# Patient Record
Sex: MALE | Race: WHITE | Employment: OTHER | URBAN - NONMETROPOLITAN AREA
[De-identification: names, ages, dates, MRNs, and addresses within clinical notes are randomized per-mention and may not be internally consistent; named-entity substitution may affect disease eponyms.]

---

## 2017-01-01 ENCOUNTER — HOSPITAL ENCOUNTER (OUTPATIENT)
Dept: PHARMACY | Age: 82
Setting detail: THERAPIES SERIES
Discharge: HOME | End: 2017-12-13
Payer: MEDICARE

## 2017-01-01 ENCOUNTER — HOSPITAL ENCOUNTER (OUTPATIENT)
Dept: LAB | Age: 82
Discharge: HOME | End: 2017-08-28
Payer: MEDICARE

## 2017-01-01 ENCOUNTER — OFFICE VISIT (OUTPATIENT)
Dept: INTERNAL MEDICINE | Age: 82
End: 2017-01-01
Payer: MEDICARE

## 2017-01-01 ENCOUNTER — HOSPITAL ENCOUNTER (OUTPATIENT)
Dept: PHARMACY | Age: 82
Setting detail: THERAPIES SERIES
Discharge: HOME | End: 2017-10-09
Payer: MEDICARE

## 2017-01-01 ENCOUNTER — HOSPITAL ENCOUNTER (OUTPATIENT)
Dept: PHARMACY | Age: 82
Setting detail: THERAPIES SERIES
Discharge: HOME | End: 2017-11-07
Payer: MEDICARE

## 2017-01-01 ENCOUNTER — TELEPHONE (OUTPATIENT)
Dept: CARDIOLOGY | Age: 82
End: 2017-01-01

## 2017-01-01 ENCOUNTER — HOSPITAL ENCOUNTER (OUTPATIENT)
Dept: PHARMACY | Age: 82
Setting detail: THERAPIES SERIES
Discharge: HOME | End: 2017-09-11
Payer: MEDICARE

## 2017-01-01 ENCOUNTER — HOSPITAL ENCOUNTER (OUTPATIENT)
Dept: PHARMACY | Age: 82
Setting detail: THERAPIES SERIES
Discharge: HOME | End: 2017-08-14
Payer: MEDICARE

## 2017-01-01 VITALS
SYSTOLIC BLOOD PRESSURE: 92 MMHG | HEIGHT: 69 IN | HEART RATE: 84 BPM | DIASTOLIC BLOOD PRESSURE: 64 MMHG | WEIGHT: 153 LBS | RESPIRATION RATE: 20 BRPM | BODY MASS INDEX: 22.66 KG/M2

## 2017-01-01 DIAGNOSIS — E11.9 TYPE 2 DIABETES MELLITUS WITHOUT COMPLICATION, WITHOUT LONG-TERM CURRENT USE OF INSULIN (HCC): ICD-10-CM

## 2017-01-01 DIAGNOSIS — Z79.01 LONG TERM CURRENT USE OF ANTICOAGULANT THERAPY: ICD-10-CM

## 2017-01-01 DIAGNOSIS — D64.9 ANEMIA, UNSPECIFIED TYPE: ICD-10-CM

## 2017-01-01 DIAGNOSIS — E78.5 HYPERLIPIDEMIA, UNSPECIFIED HYPERLIPIDEMIA TYPE: ICD-10-CM

## 2017-01-01 DIAGNOSIS — I50.9 CONGESTIVE HEART FAILURE, UNSPECIFIED CONGESTIVE HEART FAILURE CHRONICITY, UNSPECIFIED CONGESTIVE HEART FAILURE TYPE: ICD-10-CM

## 2017-01-01 DIAGNOSIS — Z85.46 HISTORY OF PROSTATE CANCER: ICD-10-CM

## 2017-01-01 DIAGNOSIS — I10 ESSENTIAL HYPERTENSION: ICD-10-CM

## 2017-01-01 DIAGNOSIS — I10 ESSENTIAL HYPERTENSION: Primary | ICD-10-CM

## 2017-01-01 LAB
HEMOGLOBIN: 11.9 G/DL (ref 13.5–17.5)
INR BLD: 1.6
INR BLD: 1.8
INR BLD: 2
INR BLD: 2.7
INR BLD: 3.5
IRON SATURATION: 24 % (ref 20–55)
IRON: 75 UG/DL (ref 59–158)
TOTAL IRON BINDING CAPACITY: 317 UG/DL (ref 250–450)
UNSATURATED IRON BINDING CAPACITY: 242 UG/DL (ref 112–347)

## 2017-01-01 PROCEDURE — 36416 COLLJ CAPILLARY BLOOD SPEC: CPT

## 2017-01-01 PROCEDURE — 99211 OFF/OP EST MAY X REQ PHY/QHP: CPT

## 2017-01-01 PROCEDURE — G8484 FLU IMMUNIZE NO ADMIN: HCPCS | Performed by: INTERNAL MEDICINE

## 2017-01-01 PROCEDURE — G0008 ADMIN INFLUENZA VIRUS VAC: HCPCS | Performed by: INTERNAL MEDICINE

## 2017-01-01 PROCEDURE — 85610 PROTHROMBIN TIME: CPT

## 2017-01-01 PROCEDURE — 83540 ASSAY OF IRON: CPT

## 2017-01-01 PROCEDURE — 1123F ACP DISCUSS/DSCN MKR DOCD: CPT | Performed by: INTERNAL MEDICINE

## 2017-01-01 PROCEDURE — G8420 CALC BMI NORM PARAMETERS: HCPCS | Performed by: INTERNAL MEDICINE

## 2017-01-01 PROCEDURE — 1036F TOBACCO NON-USER: CPT | Performed by: INTERNAL MEDICINE

## 2017-01-01 PROCEDURE — 85018 HEMOGLOBIN: CPT

## 2017-01-01 PROCEDURE — 4040F PNEUMOC VAC/ADMIN/RCVD: CPT | Performed by: INTERNAL MEDICINE

## 2017-01-01 PROCEDURE — G8427 DOCREV CUR MEDS BY ELIG CLIN: HCPCS | Performed by: INTERNAL MEDICINE

## 2017-01-01 PROCEDURE — 36415 COLL VENOUS BLD VENIPUNCTURE: CPT

## 2017-01-01 PROCEDURE — 90662 IIV NO PRSV INCREASED AG IM: CPT | Performed by: INTERNAL MEDICINE

## 2017-01-01 PROCEDURE — 83550 IRON BINDING TEST: CPT

## 2017-01-01 PROCEDURE — 99214 OFFICE O/P EST MOD 30 MIN: CPT | Performed by: INTERNAL MEDICINE

## 2017-01-01 PROCEDURE — G8598 ASA/ANTIPLAT THER USED: HCPCS | Performed by: INTERNAL MEDICINE

## 2017-01-01 RX ORDER — WARFARIN SODIUM 4 MG/1
TABLET ORAL
Qty: 30 TABLET | Refills: 11 | Status: ON HOLD | OUTPATIENT
Start: 2017-01-01 | End: 2018-01-01 | Stop reason: HOSPADM

## 2017-01-01 RX ORDER — LOSARTAN POTASSIUM 50 MG/1
50 TABLET ORAL DAILY
Qty: 30 TABLET | Refills: 11 | Status: SHIPPED | OUTPATIENT
Start: 2017-01-01 | End: 2017-01-01 | Stop reason: ALTCHOICE

## 2017-01-01 RX ORDER — FUROSEMIDE 20 MG/1
TABLET ORAL
Qty: 180 TABLET | Refills: 0 | Status: SHIPPED | OUTPATIENT
Start: 2017-01-01 | End: 2018-01-01 | Stop reason: SDUPTHER

## 2017-01-01 ASSESSMENT — ENCOUNTER SYMPTOMS
BLOOD IN STOOL: 0
VOMITING: 0
CONSTIPATION: 0
DIARRHEA: 0
BACK PAIN: 0
SHORTNESS OF BREATH: 0
EYE PAIN: 0
NAUSEA: 0
ABDOMINAL PAIN: 0
COUGH: 0

## 2017-01-18 ENCOUNTER — OFFICE VISIT (OUTPATIENT)
Dept: INTERNAL MEDICINE | Age: 82
End: 2017-01-18

## 2017-01-18 VITALS
WEIGHT: 161 LBS | HEIGHT: 70 IN | DIASTOLIC BLOOD PRESSURE: 66 MMHG | HEART RATE: 72 BPM | SYSTOLIC BLOOD PRESSURE: 110 MMHG | RESPIRATION RATE: 20 BRPM | BODY MASS INDEX: 23.05 KG/M2

## 2017-01-18 DIAGNOSIS — G47.33 OBSTRUCTIVE SLEEP APNEA SYNDROME: ICD-10-CM

## 2017-01-18 DIAGNOSIS — G62.9 ACQUIRED POLYNEUROPATHY: ICD-10-CM

## 2017-01-18 DIAGNOSIS — I48.0 PAROXYSMAL ATRIAL FIBRILLATION (HCC): ICD-10-CM

## 2017-01-18 DIAGNOSIS — E11.9 TYPE 2 DIABETES MELLITUS WITHOUT COMPLICATION, WITHOUT LONG-TERM CURRENT USE OF INSULIN (HCC): Primary | ICD-10-CM

## 2017-01-18 DIAGNOSIS — H91.93 HEARING LOSS, BILATERAL: ICD-10-CM

## 2017-01-18 DIAGNOSIS — I50.22 HEART FAILURE, SYSTOLIC, CHRONIC (HCC): ICD-10-CM

## 2017-01-18 DIAGNOSIS — I10 ESSENTIAL HYPERTENSION: ICD-10-CM

## 2017-01-18 DIAGNOSIS — I71.40 ABDOMINAL AORTIC ANEURYSM WITHOUT RUPTURE: ICD-10-CM

## 2017-01-18 DIAGNOSIS — I42.9 SECONDARY CARDIOMYOPATHY, UNSPECIFIED: ICD-10-CM

## 2017-01-18 DIAGNOSIS — E78.5 HYPERLIPIDEMIA, UNSPECIFIED HYPERLIPIDEMIA TYPE: ICD-10-CM

## 2017-01-18 DIAGNOSIS — E11.51 TYPE II DIABETES MELLITUS WITH PERIPHERAL CIRCULATORY DISORDER (HCC): ICD-10-CM

## 2017-01-18 PROCEDURE — G8427 DOCREV CUR MEDS BY ELIG CLIN: HCPCS | Performed by: INTERNAL MEDICINE

## 2017-01-18 PROCEDURE — G8420 CALC BMI NORM PARAMETERS: HCPCS | Performed by: INTERNAL MEDICINE

## 2017-01-18 PROCEDURE — 1123F ACP DISCUSS/DSCN MKR DOCD: CPT | Performed by: INTERNAL MEDICINE

## 2017-01-18 PROCEDURE — G8484 FLU IMMUNIZE NO ADMIN: HCPCS | Performed by: INTERNAL MEDICINE

## 2017-01-18 PROCEDURE — 99214 OFFICE O/P EST MOD 30 MIN: CPT | Performed by: INTERNAL MEDICINE

## 2017-01-18 PROCEDURE — 4040F PNEUMOC VAC/ADMIN/RCVD: CPT | Performed by: INTERNAL MEDICINE

## 2017-01-18 PROCEDURE — 1036F TOBACCO NON-USER: CPT | Performed by: INTERNAL MEDICINE

## 2017-01-18 PROCEDURE — G8598 ASA/ANTIPLAT THER USED: HCPCS | Performed by: INTERNAL MEDICINE

## 2017-01-18 ASSESSMENT — ENCOUNTER SYMPTOMS
EYE PAIN: 0
DIARRHEA: 0
COUGH: 0
CONSTIPATION: 0
BLOOD IN STOOL: 0
SHORTNESS OF BREATH: 0
NAUSEA: 0
VOMITING: 0
ABDOMINAL PAIN: 0
BACK PAIN: 0

## 2017-01-23 ENCOUNTER — OFFICE VISIT (OUTPATIENT)
Dept: PRIMARY CARE CLINIC | Age: 82
End: 2017-01-23

## 2017-01-23 VITALS
RESPIRATION RATE: 16 BRPM | TEMPERATURE: 98 F | SYSTOLIC BLOOD PRESSURE: 110 MMHG | HEIGHT: 70 IN | OXYGEN SATURATION: 98 % | WEIGHT: 167.8 LBS | HEART RATE: 87 BPM | BODY MASS INDEX: 24.02 KG/M2 | DIASTOLIC BLOOD PRESSURE: 60 MMHG

## 2017-01-23 DIAGNOSIS — H91.13: Primary | ICD-10-CM

## 2017-01-23 PROCEDURE — 1123F ACP DISCUSS/DSCN MKR DOCD: CPT | Performed by: NURSE PRACTITIONER

## 2017-01-23 PROCEDURE — G8427 DOCREV CUR MEDS BY ELIG CLIN: HCPCS | Performed by: NURSE PRACTITIONER

## 2017-01-23 PROCEDURE — G8598 ASA/ANTIPLAT THER USED: HCPCS | Performed by: NURSE PRACTITIONER

## 2017-01-23 PROCEDURE — 4040F PNEUMOC VAC/ADMIN/RCVD: CPT | Performed by: NURSE PRACTITIONER

## 2017-01-23 PROCEDURE — 1036F TOBACCO NON-USER: CPT | Performed by: NURSE PRACTITIONER

## 2017-01-23 PROCEDURE — 99212 OFFICE O/P EST SF 10 MIN: CPT | Performed by: NURSE PRACTITIONER

## 2017-01-23 PROCEDURE — G8420 CALC BMI NORM PARAMETERS: HCPCS | Performed by: NURSE PRACTITIONER

## 2017-01-23 PROCEDURE — G8484 FLU IMMUNIZE NO ADMIN: HCPCS | Performed by: NURSE PRACTITIONER

## 2017-01-23 ASSESSMENT — ENCOUNTER SYMPTOMS: RESPIRATORY NEGATIVE: 1

## 2017-02-09 ENCOUNTER — HOSPITAL ENCOUNTER (OUTPATIENT)
Dept: PHARMACY | Age: 82
Setting detail: THERAPIES SERIES
Discharge: HOME | End: 2017-02-09
Payer: MEDICARE

## 2017-02-09 DIAGNOSIS — Z79.01 LONG TERM CURRENT USE OF ANTICOAGULANT THERAPY: ICD-10-CM

## 2017-02-09 LAB — INR BLD: 2.3

## 2017-02-09 PROCEDURE — G0463 HOSPITAL OUTPT CLINIC VISIT: HCPCS

## 2017-02-09 PROCEDURE — 36416 COLLJ CAPILLARY BLOOD SPEC: CPT

## 2017-02-09 PROCEDURE — 85610 PROTHROMBIN TIME: CPT

## 2017-02-13 ENCOUNTER — HOSPITAL ENCOUNTER (EMERGENCY)
Age: 82
Discharge: HOME | End: 2017-02-14
Attending: EMERGENCY MEDICINE
Payer: MEDICARE

## 2017-02-13 ENCOUNTER — APPOINTMENT (OUTPATIENT)
Dept: GENERAL RADIOLOGY | Age: 82
End: 2017-02-13
Payer: MEDICARE

## 2017-02-13 VITALS
RESPIRATION RATE: 10 BRPM | SYSTOLIC BLOOD PRESSURE: 111 MMHG | OXYGEN SATURATION: 94 % | HEIGHT: 69 IN | DIASTOLIC BLOOD PRESSURE: 61 MMHG | TEMPERATURE: 100 F | BODY MASS INDEX: 23.7 KG/M2 | HEART RATE: 69 BPM | WEIGHT: 160 LBS

## 2017-02-13 DIAGNOSIS — I50.9 CHRONIC CONGESTIVE HEART FAILURE, UNSPECIFIED CONGESTIVE HEART FAILURE TYPE: ICD-10-CM

## 2017-02-13 DIAGNOSIS — I42.8 NON-ISCHEMIC CARDIOMYOPATHY (HCC): Primary | ICD-10-CM

## 2017-02-13 DIAGNOSIS — R07.89 OTHER CHEST PAIN: ICD-10-CM

## 2017-02-13 LAB
ABSOLUTE EOS #: 0.1 K/UL (ref 0–0.4)
ABSOLUTE LYMPH #: 0.9 K/UL (ref 1–4.8)
ABSOLUTE MONO #: 1.1 K/UL (ref 0.1–1.2)
ANION GAP SERPL CALCULATED.3IONS-SCNC: 13 MMOL/L (ref 9–17)
BASOPHILS # BLD: 1 % (ref 0–2)
BASOPHILS ABSOLUTE: 0.1 K/UL (ref 0–0.2)
BNP INTERPRETATION: ABNORMAL
BUN BLDV-MCNC: 31 MG/DL (ref 8–23)
BUN/CREAT BLD: 24 (ref 9–20)
CALCIUM SERPL-MCNC: 9.1 MG/DL (ref 8.6–10.4)
CHLORIDE BLD-SCNC: 93 MMOL/L (ref 98–107)
CK MB: 3.8 NG/ML
CO2: 27 MMOL/L (ref 20–31)
CREAT SERPL-MCNC: 1.3 MG/DL (ref 0.7–1.2)
DIFFERENTIAL TYPE: ABNORMAL
EKG ATRIAL RATE: 87 BPM
EKG Q-T INTERVAL: 444 MS
EKG QRS DURATION: 166 MS
EKG QTC CALCULATION (BAZETT): 482 MS
EKG R AXIS: 143 DEGREES
EKG T AXIS: 102 DEGREES
EKG VENTRICULAR RATE: 71 BPM
EOSINOPHILS RELATIVE PERCENT: 1 % (ref 1–4)
GFR AFRICAN AMERICAN: >60 ML/MIN
GFR NON-AFRICAN AMERICAN: 52 ML/MIN
GFR SERPL CREATININE-BSD FRML MDRD: ABNORMAL ML/MIN/{1.73_M2}
GFR SERPL CREATININE-BSD FRML MDRD: ABNORMAL ML/MIN/{1.73_M2}
GLUCOSE BLD-MCNC: 191 MG/DL (ref 70–99)
HCT VFR BLD CALC: 31.6 % (ref 41–53)
HEMOGLOBIN: 10.5 G/DL (ref 13.5–17.5)
INR BLD: 2.7
LYMPHOCYTES # BLD: 7 % (ref 24–44)
MCH RBC QN AUTO: 33.3 PG (ref 26–34)
MCHC RBC AUTO-ENTMCNC: 33.2 G/DL (ref 31–37)
MCV RBC AUTO: 100.4 FL (ref 80–100)
MONOCYTES # BLD: 9 % (ref 1–7)
MYOGLOBIN: 67 NG/ML (ref 28–72)
PDW BLD-RTO: 12.3 % (ref 11–14.5)
PLATELET # BLD: 190 K/UL (ref 140–450)
PLATELET ESTIMATE: ABNORMAL
PMV BLD AUTO: 7.3 FL (ref 6–12)
POTASSIUM SERPL-SCNC: 4.5 MMOL/L (ref 3.7–5.3)
PRO-BNP: 8160 PG/ML
PROTHROMBIN TIME: 29.2 SEC (ref 9.4–11.3)
RBC # BLD: 3.15 M/UL (ref 4.5–5.9)
RBC # BLD: ABNORMAL 10*6/UL
SEG NEUTROPHILS: 82 % (ref 36–66)
SEGMENTED NEUTROPHILS ABSOLUTE COUNT: 10.1 K/UL (ref 1.8–7.7)
SODIUM BLD-SCNC: 133 MMOL/L (ref 135–144)
TOTAL CK: 106 U/L (ref 39–308)
TROPONIN INTERP: ABNORMAL
TROPONIN T: 0.14 NG/ML
WBC # BLD: 12.3 K/UL (ref 3.5–11)
WBC # BLD: ABNORMAL 10*3/UL

## 2017-02-13 PROCEDURE — 99285 EMERGENCY DEPT VISIT HI MDM: CPT

## 2017-02-13 PROCEDURE — 71010 XR CHEST PORTABLE: CPT | Performed by: RADIOLOGY

## 2017-02-13 PROCEDURE — 83880 ASSAY OF NATRIURETIC PEPTIDE: CPT

## 2017-02-13 PROCEDURE — 80048 BASIC METABOLIC PNL TOTAL CA: CPT

## 2017-02-13 PROCEDURE — 85025 COMPLETE CBC W/AUTO DIFF WBC: CPT

## 2017-02-13 PROCEDURE — 36415 COLL VENOUS BLD VENIPUNCTURE: CPT

## 2017-02-13 PROCEDURE — 71010 XR CHEST PORTABLE: CPT

## 2017-02-13 PROCEDURE — 85610 PROTHROMBIN TIME: CPT

## 2017-02-13 PROCEDURE — 83874 ASSAY OF MYOGLOBIN: CPT

## 2017-02-13 PROCEDURE — 82550 ASSAY OF CK (CPK): CPT

## 2017-02-13 PROCEDURE — 82553 CREATINE MB FRACTION: CPT

## 2017-02-13 PROCEDURE — 93005 ELECTROCARDIOGRAM TRACING: CPT

## 2017-02-13 PROCEDURE — 6370000000 HC RX 637 (ALT 250 FOR IP)

## 2017-02-13 PROCEDURE — 84484 ASSAY OF TROPONIN QUANT: CPT

## 2017-02-13 RX ORDER — FUROSEMIDE 20 MG/1
20 TABLET ORAL DAILY
Status: DISCONTINUED | OUTPATIENT
Start: 2017-02-14 | End: 2017-02-14 | Stop reason: HOSPADM

## 2017-02-13 RX ORDER — FUROSEMIDE 20 MG/1
TABLET ORAL
Status: COMPLETED
Start: 2017-02-13 | End: 2017-02-13

## 2017-02-13 RX ORDER — FUROSEMIDE 10 MG/ML
20 INJECTION INTRAMUSCULAR; INTRAVENOUS ONCE
Status: DISCONTINUED | OUTPATIENT
Start: 2017-02-13 | End: 2017-02-13

## 2017-02-13 RX ADMIN — FUROSEMIDE 20 MG: 20 TABLET ORAL at 23:56

## 2017-02-13 ASSESSMENT — ENCOUNTER SYMPTOMS
EYES NEGATIVE: 1
GASTROINTESTINAL NEGATIVE: 1
RESPIRATORY NEGATIVE: 1

## 2017-02-13 ASSESSMENT — PAIN DESCRIPTION - FREQUENCY: FREQUENCY: CONTINUOUS

## 2017-02-13 ASSESSMENT — PAIN DESCRIPTION - ORIENTATION: ORIENTATION: LEFT;MID

## 2017-02-13 ASSESSMENT — PAIN DESCRIPTION - LOCATION: LOCATION: CHEST

## 2017-02-13 ASSESSMENT — PAIN DESCRIPTION - ONSET: ONSET: ON-GOING

## 2017-02-13 ASSESSMENT — PAIN DESCRIPTION - PAIN TYPE: TYPE: ACUTE PAIN

## 2017-02-13 ASSESSMENT — PAIN DESCRIPTION - DESCRIPTORS: DESCRIPTORS: SHOOTING

## 2017-02-13 ASSESSMENT — PAIN SCALES - GENERAL: PAINLEVEL_OUTOF10: 0

## 2017-02-15 ENCOUNTER — OFFICE VISIT (OUTPATIENT)
Dept: INTERNAL MEDICINE | Age: 82
End: 2017-02-15

## 2017-02-15 ENCOUNTER — HOSPITAL ENCOUNTER (OUTPATIENT)
Age: 82
Setting detail: SPECIMEN
Discharge: HOME | End: 2017-02-15
Payer: MEDICARE

## 2017-02-15 VITALS
HEART RATE: 76 BPM | WEIGHT: 164 LBS | DIASTOLIC BLOOD PRESSURE: 58 MMHG | BODY MASS INDEX: 23.48 KG/M2 | RESPIRATION RATE: 16 BRPM | SYSTOLIC BLOOD PRESSURE: 110 MMHG | HEIGHT: 70 IN

## 2017-02-15 DIAGNOSIS — D64.9 ANEMIA, UNSPECIFIED TYPE: ICD-10-CM

## 2017-02-15 DIAGNOSIS — E78.5 HYPERLIPIDEMIA, UNSPECIFIED HYPERLIPIDEMIA TYPE: ICD-10-CM

## 2017-02-15 DIAGNOSIS — E61.1 LOW IRON: Primary | ICD-10-CM

## 2017-02-15 DIAGNOSIS — I10 ESSENTIAL HYPERTENSION: Primary | ICD-10-CM

## 2017-02-15 DIAGNOSIS — I50.9 CONGESTIVE HEART FAILURE, UNSPECIFIED CONGESTIVE HEART FAILURE CHRONICITY, UNSPECIFIED CONGESTIVE HEART FAILURE TYPE: ICD-10-CM

## 2017-02-15 LAB
FOLATE: >20 NG/ML
VITAMIN B-12: 248 PG/ML (ref 211–946)

## 2017-02-15 PROCEDURE — 1036F TOBACCO NON-USER: CPT | Performed by: INTERNAL MEDICINE

## 2017-02-15 PROCEDURE — G8427 DOCREV CUR MEDS BY ELIG CLIN: HCPCS | Performed by: INTERNAL MEDICINE

## 2017-02-15 PROCEDURE — G8484 FLU IMMUNIZE NO ADMIN: HCPCS | Performed by: INTERNAL MEDICINE

## 2017-02-15 PROCEDURE — 1123F ACP DISCUSS/DSCN MKR DOCD: CPT | Performed by: INTERNAL MEDICINE

## 2017-02-15 PROCEDURE — G8420 CALC BMI NORM PARAMETERS: HCPCS | Performed by: INTERNAL MEDICINE

## 2017-02-15 PROCEDURE — G8598 ASA/ANTIPLAT THER USED: HCPCS | Performed by: INTERNAL MEDICINE

## 2017-02-15 PROCEDURE — 99214 OFFICE O/P EST MOD 30 MIN: CPT | Performed by: INTERNAL MEDICINE

## 2017-02-15 PROCEDURE — 4040F PNEUMOC VAC/ADMIN/RCVD: CPT | Performed by: INTERNAL MEDICINE

## 2017-02-15 ASSESSMENT — ENCOUNTER SYMPTOMS
COUGH: 0
BLOOD IN STOOL: 0
BACK PAIN: 0
EYE PAIN: 0
ABDOMINAL PAIN: 0
CONSTIPATION: 0
VOMITING: 0
NAUSEA: 0
SHORTNESS OF BREATH: 0
DIARRHEA: 0

## 2017-02-17 ENCOUNTER — TELEPHONE (OUTPATIENT)
Dept: INTERNAL MEDICINE | Age: 82
End: 2017-02-17

## 2017-02-17 DIAGNOSIS — R41.3 MEMORY LOSS: Primary | ICD-10-CM

## 2017-02-18 ENCOUNTER — APPOINTMENT (OUTPATIENT)
Dept: GENERAL RADIOLOGY | Age: 82
End: 2017-02-18
Payer: MEDICARE

## 2017-02-18 ENCOUNTER — HOSPITAL ENCOUNTER (EMERGENCY)
Age: 82
Discharge: HOME | End: 2017-02-18
Attending: EMERGENCY MEDICINE
Payer: MEDICARE

## 2017-02-18 VITALS
HEIGHT: 69 IN | OXYGEN SATURATION: 96 % | TEMPERATURE: 97.3 F | WEIGHT: 160 LBS | HEART RATE: 72 BPM | DIASTOLIC BLOOD PRESSURE: 63 MMHG | SYSTOLIC BLOOD PRESSURE: 115 MMHG | RESPIRATION RATE: 21 BRPM | BODY MASS INDEX: 23.7 KG/M2

## 2017-02-18 DIAGNOSIS — R07.9 CHEST PAIN, UNSPECIFIED TYPE: Primary | ICD-10-CM

## 2017-02-18 LAB
ABSOLUTE EOS #: 0.1 K/UL (ref 0–0.4)
ABSOLUTE LYMPH #: 0.6 K/UL (ref 1–4.8)
ABSOLUTE MONO #: 0.9 K/UL (ref 0.1–1.2)
ANION GAP SERPL CALCULATED.3IONS-SCNC: 12 MMOL/L (ref 9–17)
BASOPHILS # BLD: 1 % (ref 0–2)
BASOPHILS ABSOLUTE: 0.1 K/UL (ref 0–0.2)
BUN BLDV-MCNC: 45 MG/DL (ref 8–23)
BUN/CREAT BLD: 34 (ref 9–20)
CALCIUM SERPL-MCNC: 9.4 MG/DL (ref 8.6–10.4)
CHLORIDE BLD-SCNC: 97 MMOL/L (ref 98–107)
CO2: 27 MMOL/L (ref 20–31)
CREAT SERPL-MCNC: 1.33 MG/DL (ref 0.7–1.2)
DIFFERENTIAL TYPE: ABNORMAL
EOSINOPHILS RELATIVE PERCENT: 1 % (ref 1–4)
GFR AFRICAN AMERICAN: >60 ML/MIN
GFR NON-AFRICAN AMERICAN: 51 ML/MIN
GFR SERPL CREATININE-BSD FRML MDRD: ABNORMAL ML/MIN/{1.73_M2}
GFR SERPL CREATININE-BSD FRML MDRD: ABNORMAL ML/MIN/{1.73_M2}
GLUCOSE BLD-MCNC: 167 MG/DL (ref 70–99)
HCT VFR BLD CALC: 32.7 % (ref 41–53)
HEMOGLOBIN: 10.8 G/DL (ref 13.5–17.5)
LYMPHOCYTES # BLD: 5 % (ref 24–44)
MCH RBC QN AUTO: 33.1 PG (ref 26–34)
MCHC RBC AUTO-ENTMCNC: 32.9 G/DL (ref 31–37)
MCV RBC AUTO: 100.6 FL (ref 80–100)
MONOCYTES # BLD: 7 % (ref 1–7)
PDW BLD-RTO: 11.9 % (ref 11–14.5)
PLATELET # BLD: 229 K/UL (ref 140–450)
PLATELET ESTIMATE: ABNORMAL
PMV BLD AUTO: 7.1 FL (ref 6–12)
POTASSIUM SERPL-SCNC: 4.9 MMOL/L (ref 3.7–5.3)
RBC # BLD: 3.25 M/UL (ref 4.5–5.9)
RBC # BLD: ABNORMAL 10*6/UL
SEG NEUTROPHILS: 86 % (ref 36–66)
SEGMENTED NEUTROPHILS ABSOLUTE COUNT: 10.8 K/UL (ref 1.8–7.7)
SODIUM BLD-SCNC: 136 MMOL/L (ref 135–144)
TROPONIN INTERP: ABNORMAL
TROPONIN T: 0.15 NG/ML
WBC # BLD: 12.5 K/UL (ref 3.5–11)
WBC # BLD: ABNORMAL 10*3/UL

## 2017-02-18 PROCEDURE — 99285 EMERGENCY DEPT VISIT HI MDM: CPT

## 2017-02-18 PROCEDURE — 36415 COLL VENOUS BLD VENIPUNCTURE: CPT

## 2017-02-18 PROCEDURE — 71010 XR CHEST PORTABLE: CPT | Performed by: RADIOLOGY

## 2017-02-18 PROCEDURE — 84484 ASSAY OF TROPONIN QUANT: CPT

## 2017-02-18 PROCEDURE — 93005 ELECTROCARDIOGRAM TRACING: CPT

## 2017-02-18 PROCEDURE — 85025 COMPLETE CBC W/AUTO DIFF WBC: CPT

## 2017-02-18 PROCEDURE — 80048 BASIC METABOLIC PNL TOTAL CA: CPT

## 2017-02-18 PROCEDURE — 71010 XR CHEST PORTABLE: CPT

## 2017-02-18 ASSESSMENT — PAIN DESCRIPTION - LOCATION: LOCATION: CHEST

## 2017-02-18 ASSESSMENT — PAIN SCALES - GENERAL: PAINLEVEL_OUTOF10: 3

## 2017-02-18 ASSESSMENT — PAIN DESCRIPTION - PAIN TYPE: TYPE: ACUTE PAIN

## 2017-02-18 ASSESSMENT — PAIN DESCRIPTION - DESCRIPTORS: DESCRIPTORS: ACHING

## 2017-02-19 LAB
EKG ATRIAL RATE: 45 BPM
EKG Q-T INTERVAL: 458 MS
EKG QRS DURATION: 172 MS
EKG QTC CALCULATION (BAZETT): 504 MS
EKG R AXIS: 141 DEGREES
EKG T AXIS: 94 DEGREES
EKG VENTRICULAR RATE: 73 BPM

## 2017-02-20 ENCOUNTER — OFFICE VISIT (OUTPATIENT)
Dept: INTERNAL MEDICINE | Age: 82
End: 2017-02-20

## 2017-02-20 VITALS
OXYGEN SATURATION: 99 % | HEIGHT: 70 IN | TEMPERATURE: 98.3 F | DIASTOLIC BLOOD PRESSURE: 72 MMHG | RESPIRATION RATE: 16 BRPM | SYSTOLIC BLOOD PRESSURE: 120 MMHG | WEIGHT: 165 LBS | BODY MASS INDEX: 23.62 KG/M2 | HEART RATE: 90 BPM

## 2017-02-20 DIAGNOSIS — R41.3 MEMORY LOSS: ICD-10-CM

## 2017-02-20 DIAGNOSIS — I48.0 PAF (PAROXYSMAL ATRIAL FIBRILLATION) (HCC): ICD-10-CM

## 2017-02-20 DIAGNOSIS — I10 ESSENTIAL HYPERTENSION: ICD-10-CM

## 2017-02-20 DIAGNOSIS — I50.9 CONGESTIVE HEART FAILURE, UNSPECIFIED CONGESTIVE HEART FAILURE CHRONICITY, UNSPECIFIED CONGESTIVE HEART FAILURE TYPE: Primary | ICD-10-CM

## 2017-02-20 DIAGNOSIS — E11.51 TYPE II DIABETES MELLITUS WITH PERIPHERAL CIRCULATORY DISORDER (HCC): ICD-10-CM

## 2017-02-20 PROCEDURE — 4040F PNEUMOC VAC/ADMIN/RCVD: CPT | Performed by: NURSE PRACTITIONER

## 2017-02-20 PROCEDURE — G8598 ASA/ANTIPLAT THER USED: HCPCS | Performed by: NURSE PRACTITIONER

## 2017-02-20 PROCEDURE — 1123F ACP DISCUSS/DSCN MKR DOCD: CPT | Performed by: NURSE PRACTITIONER

## 2017-02-20 PROCEDURE — 1036F TOBACCO NON-USER: CPT | Performed by: NURSE PRACTITIONER

## 2017-02-20 PROCEDURE — 99214 OFFICE O/P EST MOD 30 MIN: CPT | Performed by: NURSE PRACTITIONER

## 2017-02-20 PROCEDURE — G8427 DOCREV CUR MEDS BY ELIG CLIN: HCPCS | Performed by: NURSE PRACTITIONER

## 2017-02-20 PROCEDURE — G8420 CALC BMI NORM PARAMETERS: HCPCS | Performed by: NURSE PRACTITIONER

## 2017-02-20 PROCEDURE — G8484 FLU IMMUNIZE NO ADMIN: HCPCS | Performed by: NURSE PRACTITIONER

## 2017-02-20 RX ORDER — ASCORBIC ACID 500 MG
500 TABLET ORAL 3 TIMES DAILY
COMMUNITY

## 2017-02-20 RX ORDER — PNV NO.95/FERROUS FUM/FOLIC AC 28MG-0.8MG
1 TABLET ORAL 3 TIMES DAILY
Status: ON HOLD | COMMUNITY
End: 2017-07-20

## 2017-02-21 RX ORDER — NITROGLYCERIN 0.4 MG/1
0.4 TABLET SUBLINGUAL EVERY 5 MIN PRN
Qty: 25 TABLET | Refills: 1 | Status: SHIPPED | OUTPATIENT
Start: 2017-02-21

## 2017-02-21 ASSESSMENT — ENCOUNTER SYMPTOMS
SHORTNESS OF BREATH: 0
ABDOMINAL PAIN: 0
CHEST TIGHTNESS: 0
WHEEZING: 0
COUGH: 0
NAUSEA: 0
ABDOMINAL DISTENTION: 0
DIARRHEA: 0
VOMITING: 0

## 2017-02-23 RX ORDER — RIVASTIGMINE 4.6 MG/24H
1 PATCH, EXTENDED RELEASE TRANSDERMAL DAILY
COMMUNITY
End: 2017-04-13 | Stop reason: DRUGHIGH

## 2017-03-03 RX ORDER — SPIRONOLACTONE 25 MG/1
TABLET ORAL
Qty: 30 TABLET | Refills: 11 | Status: SHIPPED | OUTPATIENT
Start: 2017-03-03 | End: 2018-01-01 | Stop reason: SDUPTHER

## 2017-03-09 ENCOUNTER — HOSPITAL ENCOUNTER (OUTPATIENT)
Dept: PHARMACY | Age: 82
Setting detail: THERAPIES SERIES
Discharge: HOME | End: 2017-03-09
Payer: MEDICARE

## 2017-03-09 DIAGNOSIS — Z79.01 LONG TERM CURRENT USE OF ANTICOAGULANT THERAPY: ICD-10-CM

## 2017-03-09 LAB — INR BLD: 2.2

## 2017-03-09 PROCEDURE — 85610 PROTHROMBIN TIME: CPT

## 2017-03-09 PROCEDURE — G0463 HOSPITAL OUTPT CLINIC VISIT: HCPCS

## 2017-03-09 PROCEDURE — 36416 COLLJ CAPILLARY BLOOD SPEC: CPT

## 2017-03-29 ENCOUNTER — OFFICE VISIT (OUTPATIENT)
Dept: INTERNAL MEDICINE | Age: 82
End: 2017-03-29
Payer: MEDICARE

## 2017-03-29 VITALS
HEART RATE: 80 BPM | WEIGHT: 162 LBS | SYSTOLIC BLOOD PRESSURE: 112 MMHG | BODY MASS INDEX: 23.19 KG/M2 | TEMPERATURE: 97.6 F | HEIGHT: 70 IN | DIASTOLIC BLOOD PRESSURE: 68 MMHG

## 2017-03-29 DIAGNOSIS — R19.7 DIARRHEA, UNSPECIFIED TYPE: Primary | ICD-10-CM

## 2017-03-29 DIAGNOSIS — D64.9 ANEMIA, UNSPECIFIED TYPE: ICD-10-CM

## 2017-03-29 PROCEDURE — G8427 DOCREV CUR MEDS BY ELIG CLIN: HCPCS | Performed by: NURSE PRACTITIONER

## 2017-03-29 PROCEDURE — 1123F ACP DISCUSS/DSCN MKR DOCD: CPT | Performed by: NURSE PRACTITIONER

## 2017-03-29 PROCEDURE — G8598 ASA/ANTIPLAT THER USED: HCPCS | Performed by: NURSE PRACTITIONER

## 2017-03-29 PROCEDURE — 4040F PNEUMOC VAC/ADMIN/RCVD: CPT | Performed by: NURSE PRACTITIONER

## 2017-03-29 PROCEDURE — G8484 FLU IMMUNIZE NO ADMIN: HCPCS | Performed by: NURSE PRACTITIONER

## 2017-03-29 PROCEDURE — 1036F TOBACCO NON-USER: CPT | Performed by: NURSE PRACTITIONER

## 2017-03-29 PROCEDURE — G8420 CALC BMI NORM PARAMETERS: HCPCS | Performed by: NURSE PRACTITIONER

## 2017-03-29 PROCEDURE — 99213 OFFICE O/P EST LOW 20 MIN: CPT | Performed by: NURSE PRACTITIONER

## 2017-03-31 ASSESSMENT — ENCOUNTER SYMPTOMS
ABDOMINAL DISTENTION: 0
ABDOMINAL PAIN: 0
ANAL BLEEDING: 0
VOMITING: 0
CONSTIPATION: 0
DIARRHEA: 1
RECTAL PAIN: 0
NAUSEA: 0

## 2017-04-06 ENCOUNTER — HOSPITAL ENCOUNTER (OUTPATIENT)
Dept: PHARMACY | Age: 82
Setting detail: THERAPIES SERIES
Discharge: HOME | End: 2017-04-06
Payer: MEDICARE

## 2017-04-06 DIAGNOSIS — Z79.01 LONG TERM CURRENT USE OF ANTICOAGULANT THERAPY: ICD-10-CM

## 2017-04-06 LAB — INR BLD: 2.2

## 2017-04-06 PROCEDURE — 36416 COLLJ CAPILLARY BLOOD SPEC: CPT

## 2017-04-06 PROCEDURE — 85610 PROTHROMBIN TIME: CPT

## 2017-04-06 PROCEDURE — G0463 HOSPITAL OUTPT CLINIC VISIT: HCPCS

## 2017-04-13 ENCOUNTER — TELEPHONE (OUTPATIENT)
Dept: INTERNAL MEDICINE | Age: 82
End: 2017-04-13

## 2017-04-13 RX ORDER — RIVASTIGMINE TARTRATE 6 MG/1
6 CAPSULE ORAL 2 TIMES DAILY
COMMUNITY
End: 2017-05-15

## 2017-04-17 RX ORDER — WARFARIN SODIUM 1 MG/1
TABLET ORAL
Qty: 90 TABLET | Refills: 3 | Status: ON HOLD | OUTPATIENT
Start: 2017-04-17 | End: 2018-01-01

## 2017-04-20 RX ORDER — ATORVASTATIN CALCIUM 10 MG/1
TABLET, FILM COATED ORAL
Qty: 30 TABLET | Refills: 11 | Status: SHIPPED | OUTPATIENT
Start: 2017-04-20 | End: 2018-01-01 | Stop reason: SDUPTHER

## 2017-04-24 ENCOUNTER — TELEPHONE (OUTPATIENT)
Dept: INTERNAL MEDICINE | Age: 82
End: 2017-04-24

## 2017-05-03 LAB
ABSOLUTE EOS #: 0.2 K/UL (ref 0–0.4)
ABSOLUTE LYMPH #: 1 K/UL (ref 1–4.8)
ABSOLUTE MONO #: 0.8 K/UL (ref 0.1–1.2)
BASOPHILS # BLD: 1 %
BASOPHILS ABSOLUTE: 0.1 K/UL (ref 0–0.2)
DIFFERENTIAL TYPE: ABNORMAL
EOSINOPHILS RELATIVE PERCENT: 3 %
HCT VFR BLD CALC: 35.3 % (ref 41–53)
HEMOGLOBIN: 11.7 G/DL (ref 13.5–17.5)
LYMPHOCYTES # BLD: 12 %
MCH RBC QN AUTO: 32.5 PG (ref 26–34)
MCHC RBC AUTO-ENTMCNC: 33.1 G/DL (ref 31–37)
MCV RBC AUTO: 98.4 FL (ref 80–100)
MONOCYTES # BLD: 9 %
PDW BLD-RTO: 13.5 % (ref 11–14.5)
PLATELET # BLD: 219 K/UL (ref 140–450)
PLATELET ESTIMATE: ABNORMAL
PMV BLD AUTO: 6.8 FL (ref 6–12)
RBC # BLD: 3.59 M/UL (ref 4.5–5.9)
RBC # BLD: ABNORMAL 10*6/UL
SEG NEUTROPHILS: 75 %
SEGMENTED NEUTROPHILS ABSOLUTE COUNT: 6.8 K/UL (ref 1.8–7.7)
WBC # BLD: 9 K/UL (ref 3.5–11)
WBC # BLD: ABNORMAL 10*3/UL

## 2017-05-04 ENCOUNTER — HOSPITAL ENCOUNTER (OUTPATIENT)
Dept: PHARMACY | Age: 82
Setting detail: THERAPIES SERIES
Discharge: HOME | End: 2017-05-04
Payer: MEDICARE

## 2017-05-04 DIAGNOSIS — Z79.01 LONG TERM CURRENT USE OF ANTICOAGULANT THERAPY: ICD-10-CM

## 2017-05-04 LAB — INR BLD: 2.7

## 2017-05-04 PROCEDURE — 85610 PROTHROMBIN TIME: CPT

## 2017-05-04 PROCEDURE — 36416 COLLJ CAPILLARY BLOOD SPEC: CPT

## 2017-05-04 PROCEDURE — 99211 OFF/OP EST MAY X REQ PHY/QHP: CPT

## 2017-05-09 LAB
ANION GAP SERPL CALCULATED.3IONS-SCNC: 14 MMOL/L (ref 9–17)
BNP INTERPRETATION: ABNORMAL
BUN BLDV-MCNC: 31 MG/DL (ref 8–23)
BUN/CREAT BLD: 25 (ref 9–20)
CALCIUM SERPL-MCNC: 9.3 MG/DL (ref 8.6–10.4)
CHLORIDE BLD-SCNC: 96 MMOL/L (ref 98–107)
CO2: 26 MMOL/L (ref 20–31)
CREAT SERPL-MCNC: 1.26 MG/DL (ref 0.7–1.2)
GFR AFRICAN AMERICAN: >60 ML/MIN
GFR NON-AFRICAN AMERICAN: 54 ML/MIN
GFR SERPL CREATININE-BSD FRML MDRD: ABNORMAL ML/MIN/{1.73_M2}
GFR SERPL CREATININE-BSD FRML MDRD: ABNORMAL ML/MIN/{1.73_M2}
GLUCOSE BLD-MCNC: 153 MG/DL (ref 70–99)
POTASSIUM SERPL-SCNC: 4.5 MMOL/L (ref 3.7–5.3)
PRO-BNP: 9360 PG/ML
SODIUM BLD-SCNC: 136 MMOL/L (ref 135–144)

## 2017-05-12 ENCOUNTER — OFFICE VISIT (OUTPATIENT)
Dept: INTERNAL MEDICINE | Age: 82
End: 2017-05-12
Payer: MEDICARE

## 2017-05-12 VITALS
HEART RATE: 81 BPM | SYSTOLIC BLOOD PRESSURE: 100 MMHG | HEIGHT: 70 IN | RESPIRATION RATE: 20 BRPM | BODY MASS INDEX: 22.9 KG/M2 | WEIGHT: 160 LBS | DIASTOLIC BLOOD PRESSURE: 64 MMHG

## 2017-05-12 DIAGNOSIS — M25.552 LEFT HIP PAIN: ICD-10-CM

## 2017-05-12 DIAGNOSIS — D64.9 ANEMIA, UNSPECIFIED TYPE: ICD-10-CM

## 2017-05-12 DIAGNOSIS — E78.5 HYPERLIPIDEMIA, UNSPECIFIED HYPERLIPIDEMIA TYPE: ICD-10-CM

## 2017-05-12 DIAGNOSIS — I10 ESSENTIAL HYPERTENSION: Primary | ICD-10-CM

## 2017-05-12 DIAGNOSIS — I50.9 CONGESTIVE HEART FAILURE, UNSPECIFIED CONGESTIVE HEART FAILURE CHRONICITY, UNSPECIFIED CONGESTIVE HEART FAILURE TYPE: ICD-10-CM

## 2017-05-12 PROCEDURE — 1036F TOBACCO NON-USER: CPT | Performed by: INTERNAL MEDICINE

## 2017-05-12 PROCEDURE — G8427 DOCREV CUR MEDS BY ELIG CLIN: HCPCS | Performed by: INTERNAL MEDICINE

## 2017-05-12 PROCEDURE — 99214 OFFICE O/P EST MOD 30 MIN: CPT | Performed by: INTERNAL MEDICINE

## 2017-05-12 PROCEDURE — G8598 ASA/ANTIPLAT THER USED: HCPCS | Performed by: INTERNAL MEDICINE

## 2017-05-12 PROCEDURE — 1123F ACP DISCUSS/DSCN MKR DOCD: CPT | Performed by: INTERNAL MEDICINE

## 2017-05-12 PROCEDURE — 4040F PNEUMOC VAC/ADMIN/RCVD: CPT | Performed by: INTERNAL MEDICINE

## 2017-05-12 PROCEDURE — G8420 CALC BMI NORM PARAMETERS: HCPCS | Performed by: INTERNAL MEDICINE

## 2017-05-12 ASSESSMENT — ENCOUNTER SYMPTOMS
COUGH: 0
CONSTIPATION: 0
DIARRHEA: 0
BLOOD IN STOOL: 0
BACK PAIN: 0
EYE PAIN: 0
ABDOMINAL PAIN: 0
VOMITING: 0
SHORTNESS OF BREATH: 0
NAUSEA: 0

## 2017-05-12 ASSESSMENT — PATIENT HEALTH QUESTIONNAIRE - PHQ9
SUM OF ALL RESPONSES TO PHQ9 QUESTIONS 1 & 2: 0
SUM OF ALL RESPONSES TO PHQ QUESTIONS 1-9: 0
2. FEELING DOWN, DEPRESSED OR HOPELESS: 0
1. LITTLE INTEREST OR PLEASURE IN DOING THINGS: 0

## 2017-05-23 ENCOUNTER — OFFICE VISIT (OUTPATIENT)
Dept: ORTHOPEDIC SURGERY | Age: 82
End: 2017-05-23
Payer: MEDICARE

## 2017-05-23 VITALS
DIASTOLIC BLOOD PRESSURE: 67 MMHG | BODY MASS INDEX: 23.7 KG/M2 | SYSTOLIC BLOOD PRESSURE: 125 MMHG | WEIGHT: 160 LBS | HEIGHT: 69 IN | HEART RATE: 91 BPM

## 2017-05-23 DIAGNOSIS — M48.061 LUMBAR STENOSIS: Primary | ICD-10-CM

## 2017-05-23 PROCEDURE — G8427 DOCREV CUR MEDS BY ELIG CLIN: HCPCS | Performed by: PHYSICIAN ASSISTANT

## 2017-05-23 PROCEDURE — 4040F PNEUMOC VAC/ADMIN/RCVD: CPT | Performed by: PHYSICIAN ASSISTANT

## 2017-05-23 PROCEDURE — 99213 OFFICE O/P EST LOW 20 MIN: CPT | Performed by: PHYSICIAN ASSISTANT

## 2017-05-23 PROCEDURE — G8420 CALC BMI NORM PARAMETERS: HCPCS | Performed by: PHYSICIAN ASSISTANT

## 2017-05-23 PROCEDURE — 1036F TOBACCO NON-USER: CPT | Performed by: PHYSICIAN ASSISTANT

## 2017-05-23 PROCEDURE — G8598 ASA/ANTIPLAT THER USED: HCPCS | Performed by: PHYSICIAN ASSISTANT

## 2017-05-23 PROCEDURE — 1123F ACP DISCUSS/DSCN MKR DOCD: CPT | Performed by: PHYSICIAN ASSISTANT

## 2017-05-25 ENCOUNTER — TELEPHONE (OUTPATIENT)
Dept: PAIN MANAGEMENT | Age: 82
End: 2017-05-25

## 2017-05-30 ENCOUNTER — HOSPITAL ENCOUNTER (OUTPATIENT)
Dept: PHARMACY | Age: 82
Setting detail: THERAPIES SERIES
Discharge: HOME | End: 2017-05-30
Payer: MEDICARE

## 2017-05-30 DIAGNOSIS — Z79.01 LONG TERM CURRENT USE OF ANTICOAGULANT THERAPY: ICD-10-CM

## 2017-05-30 LAB — INR BLD: 2.5

## 2017-05-30 PROCEDURE — 36416 COLLJ CAPILLARY BLOOD SPEC: CPT

## 2017-05-30 PROCEDURE — 85610 PROTHROMBIN TIME: CPT

## 2017-05-30 PROCEDURE — 99211 OFF/OP EST MAY X REQ PHY/QHP: CPT

## 2017-06-15 ENCOUNTER — APPOINTMENT (OUTPATIENT)
Dept: GENERAL RADIOLOGY | Age: 82
End: 2017-06-15
Payer: MEDICARE

## 2017-06-15 ENCOUNTER — HOSPITAL ENCOUNTER (EMERGENCY)
Age: 82
Discharge: HOME | End: 2017-06-16
Attending: SPECIALIST
Payer: MEDICARE

## 2017-06-15 DIAGNOSIS — R07.9 CHEST PAIN, UNSPECIFIED TYPE: Primary | ICD-10-CM

## 2017-06-15 LAB
-: NORMAL
ABSOLUTE EOS #: 0 K/UL (ref 0–0.4)
ABSOLUTE LYMPH #: 0.8 K/UL (ref 1–4.8)
ABSOLUTE MONO #: 1.2 K/UL (ref 0.1–1.2)
AMORPHOUS: NORMAL
ANION GAP SERPL CALCULATED.3IONS-SCNC: 15 MMOL/L (ref 9–17)
BACTERIA: NORMAL
BASOPHILS # BLD: 1 %
BASOPHILS ABSOLUTE: 0.1 K/UL (ref 0–0.2)
BILIRUBIN URINE: NEGATIVE
BNP INTERPRETATION: ABNORMAL
BUN BLDV-MCNC: 32 MG/DL (ref 8–23)
BUN/CREAT BLD: 25 (ref 9–20)
CALCIUM SERPL-MCNC: 9.3 MG/DL (ref 8.6–10.4)
CASTS UA: NORMAL /LPF (ref 0–2)
CHLORIDE BLD-SCNC: 93 MMOL/L (ref 98–107)
CO2: 24 MMOL/L (ref 20–31)
COLOR: NORMAL
COMMENT UA: NORMAL
CREAT SERPL-MCNC: 1.29 MG/DL (ref 0.7–1.2)
CRYSTALS, UA: NORMAL /HPF
DIFFERENTIAL TYPE: ABNORMAL
EKG ATRIAL RATE: 70 BPM
EKG Q-T INTERVAL: 450 MS
EKG QRS DURATION: 176 MS
EKG QTC CALCULATION (BAZETT): 489 MS
EKG R AXIS: 136 DEGREES
EKG T AXIS: 97 DEGREES
EKG VENTRICULAR RATE: 71 BPM
EOSINOPHILS RELATIVE PERCENT: 0 %
EPITHELIAL CELLS UA: NORMAL /HPF (ref 0–5)
GFR AFRICAN AMERICAN: >60 ML/MIN
GFR NON-AFRICAN AMERICAN: 53 ML/MIN
GFR SERPL CREATININE-BSD FRML MDRD: ABNORMAL ML/MIN/{1.73_M2}
GFR SERPL CREATININE-BSD FRML MDRD: ABNORMAL ML/MIN/{1.73_M2}
GLUCOSE BLD-MCNC: 129 MG/DL (ref 70–99)
GLUCOSE URINE: NEGATIVE
HCT VFR BLD CALC: 31.9 % (ref 41–53)
HEMOGLOBIN: 10.6 G/DL (ref 13.5–17.5)
INR BLD: 2.1
KETONES, URINE: NEGATIVE
LEUKOCYTE ESTERASE, URINE: NEGATIVE
LYMPHOCYTES # BLD: 7 %
MAGNESIUM: 2.1 MG/DL (ref 1.6–2.6)
MCH RBC QN AUTO: 32.7 PG (ref 26–34)
MCHC RBC AUTO-ENTMCNC: 33.3 G/DL (ref 31–37)
MCV RBC AUTO: 98.3 FL (ref 80–100)
MONOCYTES # BLD: 10 %
MUCUS: NORMAL
MYOGLOBIN: 75 NG/ML (ref 28–72)
NITRITE, URINE: NEGATIVE
OTHER OBSERVATIONS UA: NORMAL
PDW BLD-RTO: 14.1 % (ref 11–14.5)
PH UA: 5.5 (ref 5–6)
PLATELET # BLD: 176 K/UL (ref 140–450)
PLATELET ESTIMATE: ABNORMAL
PMV BLD AUTO: 7.7 FL (ref 6–12)
POTASSIUM SERPL-SCNC: 4.6 MMOL/L (ref 3.7–5.3)
PRO-BNP: ABNORMAL PG/ML
PROTEIN UA: NEGATIVE
PROTHROMBIN TIME: 22.4 SEC (ref 9.4–11.3)
RBC # BLD: 3.25 M/UL (ref 4.5–5.9)
RBC # BLD: ABNORMAL 10*6/UL
RBC UA: NORMAL /HPF (ref 0–4)
RENAL EPITHELIAL, UA: NORMAL /HPF
SEG NEUTROPHILS: 82 %
SEGMENTED NEUTROPHILS ABSOLUTE COUNT: 9.6 K/UL (ref 1.8–7.7)
SODIUM BLD-SCNC: 132 MMOL/L (ref 135–144)
SPECIFIC GRAVITY UA: 1.01 (ref 1.01–1.02)
TRICHOMONAS: NORMAL
TROPONIN INTERP: ABNORMAL
TROPONIN T: 0.19 NG/ML
TURBIDITY: NORMAL
URINE HGB: NEGATIVE
UROBILINOGEN, URINE: NORMAL
WBC # BLD: 11.8 K/UL (ref 3.5–11)
WBC # BLD: ABNORMAL 10*3/UL
WBC UA: NORMAL /HPF (ref 0–4)
YEAST: NORMAL

## 2017-06-15 PROCEDURE — 81001 URINALYSIS AUTO W/SCOPE: CPT

## 2017-06-15 PROCEDURE — 6370000000 HC RX 637 (ALT 250 FOR IP): Performed by: SPECIALIST

## 2017-06-15 PROCEDURE — 85025 COMPLETE CBC W/AUTO DIFF WBC: CPT

## 2017-06-15 PROCEDURE — 83874 ASSAY OF MYOGLOBIN: CPT

## 2017-06-15 PROCEDURE — 83735 ASSAY OF MAGNESIUM: CPT

## 2017-06-15 PROCEDURE — 99285 EMERGENCY DEPT VISIT HI MDM: CPT

## 2017-06-15 PROCEDURE — 80048 BASIC METABOLIC PNL TOTAL CA: CPT

## 2017-06-15 PROCEDURE — 85610 PROTHROMBIN TIME: CPT

## 2017-06-15 PROCEDURE — 83880 ASSAY OF NATRIURETIC PEPTIDE: CPT

## 2017-06-15 PROCEDURE — 71020 XR CHEST STANDARD TWO VW: CPT

## 2017-06-15 PROCEDURE — 93005 ELECTROCARDIOGRAM TRACING: CPT

## 2017-06-15 PROCEDURE — 71020 XR CHEST STANDARD TWO VW: CPT | Performed by: RADIOLOGY

## 2017-06-15 PROCEDURE — 84484 ASSAY OF TROPONIN QUANT: CPT

## 2017-06-15 PROCEDURE — 36415 COLL VENOUS BLD VENIPUNCTURE: CPT

## 2017-06-15 RX ORDER — ASPIRIN 81 MG/1
81 TABLET, CHEWABLE ORAL ONCE
Status: COMPLETED | OUTPATIENT
Start: 2017-06-15 | End: 2017-06-15

## 2017-06-15 RX ADMIN — ASPIRIN 81 MG 81 MG: 81 TABLET ORAL at 22:42

## 2017-06-16 VITALS
HEIGHT: 69 IN | OXYGEN SATURATION: 95 % | RESPIRATION RATE: 16 BRPM | SYSTOLIC BLOOD PRESSURE: 106 MMHG | WEIGHT: 155 LBS | TEMPERATURE: 99.7 F | HEART RATE: 69 BPM | BODY MASS INDEX: 22.96 KG/M2 | DIASTOLIC BLOOD PRESSURE: 68 MMHG

## 2017-06-16 LAB
EKG ATRIAL RATE: 96 BPM
EKG Q-T INTERVAL: 466 MS
EKG QRS DURATION: 178 MS
EKG QTC CALCULATION (BAZETT): 510 MS
EKG R AXIS: 136 DEGREES
EKG T AXIS: 99 DEGREES
EKG VENTRICULAR RATE: 72 BPM
MYOGLOBIN: 73 NG/ML (ref 28–72)
TROPONIN INTERP: ABNORMAL
TROPONIN T: 0.18 NG/ML

## 2017-06-16 PROCEDURE — 93005 ELECTROCARDIOGRAM TRACING: CPT

## 2017-06-16 PROCEDURE — 36415 COLL VENOUS BLD VENIPUNCTURE: CPT

## 2017-06-16 PROCEDURE — 84484 ASSAY OF TROPONIN QUANT: CPT

## 2017-06-16 PROCEDURE — 83874 ASSAY OF MYOGLOBIN: CPT

## 2017-06-16 ASSESSMENT — ENCOUNTER SYMPTOMS
DIARRHEA: 0
WHEEZING: 0
SHORTNESS OF BREATH: 1
VOMITING: 0
COUGH: 0
NAUSEA: 1
ABDOMINAL PAIN: 0

## 2017-06-19 ENCOUNTER — OFFICE VISIT (OUTPATIENT)
Dept: INTERNAL MEDICINE | Age: 82
End: 2017-06-19
Payer: MEDICARE

## 2017-06-19 VITALS
RESPIRATION RATE: 20 BRPM | HEIGHT: 70 IN | HEART RATE: 76 BPM | BODY MASS INDEX: 23.05 KG/M2 | DIASTOLIC BLOOD PRESSURE: 60 MMHG | WEIGHT: 161 LBS | SYSTOLIC BLOOD PRESSURE: 100 MMHG

## 2017-06-19 DIAGNOSIS — R07.9 CHEST PAIN, UNSPECIFIED TYPE: ICD-10-CM

## 2017-06-19 DIAGNOSIS — I50.9 CONGESTIVE HEART FAILURE, UNSPECIFIED CONGESTIVE HEART FAILURE CHRONICITY, UNSPECIFIED CONGESTIVE HEART FAILURE TYPE: ICD-10-CM

## 2017-06-19 DIAGNOSIS — E78.5 HYPERLIPIDEMIA, UNSPECIFIED HYPERLIPIDEMIA TYPE: ICD-10-CM

## 2017-06-19 DIAGNOSIS — I10 ESSENTIAL HYPERTENSION: Primary | ICD-10-CM

## 2017-06-19 PROCEDURE — 1036F TOBACCO NON-USER: CPT | Performed by: INTERNAL MEDICINE

## 2017-06-19 PROCEDURE — 1123F ACP DISCUSS/DSCN MKR DOCD: CPT | Performed by: INTERNAL MEDICINE

## 2017-06-19 PROCEDURE — 99214 OFFICE O/P EST MOD 30 MIN: CPT | Performed by: INTERNAL MEDICINE

## 2017-06-19 PROCEDURE — G8420 CALC BMI NORM PARAMETERS: HCPCS | Performed by: INTERNAL MEDICINE

## 2017-06-19 PROCEDURE — G8427 DOCREV CUR MEDS BY ELIG CLIN: HCPCS | Performed by: INTERNAL MEDICINE

## 2017-06-19 PROCEDURE — 4040F PNEUMOC VAC/ADMIN/RCVD: CPT | Performed by: INTERNAL MEDICINE

## 2017-06-19 PROCEDURE — G8598 ASA/ANTIPLAT THER USED: HCPCS | Performed by: INTERNAL MEDICINE

## 2017-06-19 ASSESSMENT — ENCOUNTER SYMPTOMS
BACK PAIN: 0
SHORTNESS OF BREATH: 0
DIARRHEA: 0
BLOOD IN STOOL: 0
CONSTIPATION: 0
EYE PAIN: 0
NAUSEA: 0
VOMITING: 0
ABDOMINAL PAIN: 0
COUGH: 0

## 2017-07-14 ENCOUNTER — HOSPITAL ENCOUNTER (OUTPATIENT)
Dept: PHARMACY | Age: 82
Setting detail: THERAPIES SERIES
Discharge: HOME | End: 2017-07-14
Payer: MEDICARE

## 2017-07-14 DIAGNOSIS — Z79.01 LONG TERM CURRENT USE OF ANTICOAGULANT THERAPY: ICD-10-CM

## 2017-07-14 LAB — INR BLD: 3.1

## 2017-07-14 PROCEDURE — 99211 OFF/OP EST MAY X REQ PHY/QHP: CPT

## 2017-07-14 PROCEDURE — 85610 PROTHROMBIN TIME: CPT

## 2017-07-14 PROCEDURE — 36416 COLLJ CAPILLARY BLOOD SPEC: CPT

## 2017-07-20 ENCOUNTER — APPOINTMENT (OUTPATIENT)
Dept: GENERAL RADIOLOGY | Age: 82
End: 2017-07-20
Payer: MEDICARE

## 2017-07-20 ENCOUNTER — HOSPITAL ENCOUNTER (OUTPATIENT)
Age: 82
Setting detail: OBSERVATION
Discharge: HOME | End: 2017-07-20
Attending: EMERGENCY MEDICINE | Admitting: FAMILY MEDICINE
Payer: MEDICARE

## 2017-07-20 VITALS
DIASTOLIC BLOOD PRESSURE: 53 MMHG | BODY MASS INDEX: 22.96 KG/M2 | WEIGHT: 155 LBS | SYSTOLIC BLOOD PRESSURE: 104 MMHG | OXYGEN SATURATION: 99 % | HEART RATE: 69 BPM | RESPIRATION RATE: 20 BRPM | TEMPERATURE: 98.6 F | HEIGHT: 69 IN

## 2017-07-20 DIAGNOSIS — R07.9 CHEST PAIN, UNSPECIFIED TYPE: Primary | ICD-10-CM

## 2017-07-20 DIAGNOSIS — I21.4 NSTEMI (NON-ST ELEVATED MYOCARDIAL INFARCTION) (HCC): ICD-10-CM

## 2017-07-20 PROBLEM — R07.2 PRECORDIAL PAIN: Status: ACTIVE | Noted: 2017-07-20

## 2017-07-20 LAB
ABSOLUTE EOS #: 0.16 K/UL (ref 0–0.4)
ABSOLUTE LYMPH #: 1.18 K/UL (ref 1–4.8)
ABSOLUTE MONO #: 1.3 K/UL (ref 0.1–1.2)
ANION GAP SERPL CALCULATED.3IONS-SCNC: 13 MMOL/L (ref 9–17)
BASOPHILS # BLD: 1 %
BASOPHILS ABSOLUTE: 0.08 K/UL (ref 0–0.2)
BUN BLDV-MCNC: 39 MG/DL (ref 8–23)
BUN/CREAT BLD: 29 (ref 9–20)
CALCIUM SERPL-MCNC: 9.2 MG/DL (ref 8.6–10.4)
CHLORIDE BLD-SCNC: 93 MMOL/L (ref 98–107)
CO2: 26 MMOL/L (ref 20–31)
CREAT SERPL-MCNC: 1.34 MG/DL (ref 0.7–1.2)
DIFFERENTIAL TYPE: ABNORMAL
DIGOXIN DATE LAST DOSE: NORMAL
DIGOXIN DOSE AMOUNT: NORMAL
DIGOXIN DOSE TIME: NORMAL
DIGOXIN LEVEL: 1.3 NG/ML (ref 0.5–2)
EKG ATRIAL RATE: 76 BPM
EKG ATRIAL RATE: 78 BPM
EKG Q-T INTERVAL: 438 MS
EKG Q-T INTERVAL: 458 MS
EKG QRS DURATION: 178 MS
EKG QRS DURATION: 184 MS
EKG QTC CALCULATION (BAZETT): 492 MS
EKG QTC CALCULATION (BAZETT): 504 MS
EKG R AXIS: 136 DEGREES
EKG R AXIS: 137 DEGREES
EKG T AXIS: 96 DEGREES
EKG T AXIS: 99 DEGREES
EKG VENTRICULAR RATE: 73 BPM
EKG VENTRICULAR RATE: 76 BPM
EOSINOPHILS RELATIVE PERCENT: 1 %
GFR AFRICAN AMERICAN: >60 ML/MIN
GFR NON-AFRICAN AMERICAN: 50 ML/MIN
GFR SERPL CREATININE-BSD FRML MDRD: ABNORMAL ML/MIN/{1.73_M2}
GFR SERPL CREATININE-BSD FRML MDRD: ABNORMAL ML/MIN/{1.73_M2}
GLUCOSE BLD-MCNC: 126 MG/DL (ref 70–99)
HCT VFR BLD CALC: 30.3 % (ref 41–53)
HEMOGLOBIN: 10.4 G/DL (ref 13.5–17.5)
INR BLD: 2.6
LYMPHOCYTES # BLD: 9 %
MAGNESIUM: 1.9 MG/DL (ref 1.6–2.6)
MCH RBC QN AUTO: 32.5 PG (ref 26–34)
MCHC RBC AUTO-ENTMCNC: 34.4 G/DL (ref 31–37)
MCV RBC AUTO: 94.4 FL (ref 80–100)
MONOCYTES # BLD: 10 %
MYOGLOBIN: 60 NG/ML (ref 28–72)
MYOGLOBIN: 64 NG/ML (ref 28–72)
PARTIAL THROMBOPLASTIN TIME: 47.5 SEC (ref 27–35)
PARTIAL THROMBOPLASTIN TIME: 99.3 SEC (ref 27–35)
PDW BLD-RTO: 14.9 % (ref 11–14.5)
PHOSPHORUS: 3.1 MG/DL (ref 2.5–4.5)
PLATELET # BLD: 241 K/UL (ref 140–450)
PLATELET ESTIMATE: ABNORMAL
PMV BLD AUTO: 7 FL (ref 6–12)
POTASSIUM SERPL-SCNC: 4.7 MMOL/L (ref 3.7–5.3)
PROTHROMBIN TIME: 27.4 SEC (ref 9.4–11.3)
RBC # BLD: 3.21 M/UL (ref 4.5–5.9)
RBC # BLD: ABNORMAL 10*6/UL
SEG NEUTROPHILS: 79 %
SEGMENTED NEUTROPHILS ABSOLUTE COUNT: 10.83 K/UL (ref 1.8–7.7)
SODIUM BLD-SCNC: 132 MMOL/L (ref 135–144)
TROPONIN INTERP: ABNORMAL
TROPONIN T: 0.16 NG/ML
TROPONIN T: 0.2 NG/ML
TROPONIN T: 0.21 NG/ML
WBC # BLD: 13.5 K/UL (ref 3.5–11)
WBC # BLD: ABNORMAL 10*3/UL

## 2017-07-20 PROCEDURE — 83874 ASSAY OF MYOGLOBIN: CPT

## 2017-07-20 PROCEDURE — 96366 THER/PROPH/DIAG IV INF ADDON: CPT

## 2017-07-20 PROCEDURE — 85730 THROMBOPLASTIN TIME PARTIAL: CPT

## 2017-07-20 PROCEDURE — 85610 PROTHROMBIN TIME: CPT

## 2017-07-20 PROCEDURE — G0378 HOSPITAL OBSERVATION PER HR: HCPCS

## 2017-07-20 PROCEDURE — 96365 THER/PROPH/DIAG IV INF INIT: CPT

## 2017-07-20 PROCEDURE — 71010 XR CHEST PORTABLE: CPT

## 2017-07-20 PROCEDURE — 93005 ELECTROCARDIOGRAM TRACING: CPT

## 2017-07-20 PROCEDURE — 85025 COMPLETE CBC W/AUTO DIFF WBC: CPT

## 2017-07-20 PROCEDURE — 6360000002 HC RX W HCPCS: Performed by: EMERGENCY MEDICINE

## 2017-07-20 PROCEDURE — 99220 PR INITIAL OBSERVATION CARE/DAY 70 MINUTES: CPT | Performed by: INTERNAL MEDICINE

## 2017-07-20 PROCEDURE — 36415 COLL VENOUS BLD VENIPUNCTURE: CPT

## 2017-07-20 PROCEDURE — 99285 EMERGENCY DEPT VISIT HI MDM: CPT

## 2017-07-20 PROCEDURE — 71010 XR CHEST PORTABLE: CPT | Performed by: RADIOLOGY

## 2017-07-20 PROCEDURE — 83735 ASSAY OF MAGNESIUM: CPT

## 2017-07-20 PROCEDURE — 2580000003 HC RX 258: Performed by: FAMILY MEDICINE

## 2017-07-20 PROCEDURE — 80048 BASIC METABOLIC PNL TOTAL CA: CPT

## 2017-07-20 PROCEDURE — 80162 ASSAY OF DIGOXIN TOTAL: CPT

## 2017-07-20 PROCEDURE — 84100 ASSAY OF PHOSPHORUS: CPT

## 2017-07-20 PROCEDURE — 84484 ASSAY OF TROPONIN QUANT: CPT

## 2017-07-20 PROCEDURE — 6370000000 HC RX 637 (ALT 250 FOR IP): Performed by: FAMILY MEDICINE

## 2017-07-20 PROCEDURE — 99215 OFFICE O/P EST HI 40 MIN: CPT | Performed by: INTERNAL MEDICINE

## 2017-07-20 RX ORDER — MORPHINE SULFATE 4 MG/ML
4 INJECTION, SOLUTION INTRAMUSCULAR; INTRAVENOUS
Status: DISCONTINUED | OUTPATIENT
Start: 2017-07-20 | End: 2017-07-20 | Stop reason: HOSPADM

## 2017-07-20 RX ORDER — MELATONIN
1000 DAILY
Status: DISCONTINUED | OUTPATIENT
Start: 2017-07-20 | End: 2017-07-20 | Stop reason: HOSPADM

## 2017-07-20 RX ORDER — DIGOXIN 125 MCG
125 TABLET ORAL DAILY
Status: DISCONTINUED | OUTPATIENT
Start: 2017-07-20 | End: 2017-07-20 | Stop reason: HOSPADM

## 2017-07-20 RX ORDER — ONDANSETRON 2 MG/ML
4 INJECTION INTRAMUSCULAR; INTRAVENOUS EVERY 6 HOURS PRN
Status: DISCONTINUED | OUTPATIENT
Start: 2017-07-20 | End: 2017-07-20 | Stop reason: HOSPADM

## 2017-07-20 RX ORDER — CARVEDILOL 3.12 MG/1
3.12 TABLET ORAL 2 TIMES DAILY WITH MEALS
Status: DISCONTINUED | OUTPATIENT
Start: 2017-07-20 | End: 2017-07-20 | Stop reason: HOSPADM

## 2017-07-20 RX ORDER — HEPARIN SODIUM 1000 [USP'U]/ML
2000 INJECTION, SOLUTION INTRAVENOUS; SUBCUTANEOUS PRN
Status: DISCONTINUED | OUTPATIENT
Start: 2017-07-20 | End: 2017-07-20 | Stop reason: HOSPADM

## 2017-07-20 RX ORDER — HEPARIN SODIUM 10000 [USP'U]/100ML
12 INJECTION, SOLUTION INTRAVENOUS CONTINUOUS
Status: DISCONTINUED | OUTPATIENT
Start: 2017-07-20 | End: 2017-07-20 | Stop reason: HOSPADM

## 2017-07-20 RX ORDER — MORPHINE SULFATE 2 MG/ML
2 INJECTION, SOLUTION INTRAMUSCULAR; INTRAVENOUS
Status: DISCONTINUED | OUTPATIENT
Start: 2017-07-20 | End: 2017-07-20 | Stop reason: HOSPADM

## 2017-07-20 RX ORDER — ASCORBIC ACID 500 MG
500 TABLET ORAL 3 TIMES DAILY
Status: DISCONTINUED | OUTPATIENT
Start: 2017-07-20 | End: 2017-07-20 | Stop reason: HOSPADM

## 2017-07-20 RX ORDER — NITROGLYCERIN 0.4 MG/1
0.4 TABLET SUBLINGUAL EVERY 5 MIN PRN
Status: DISCONTINUED | OUTPATIENT
Start: 2017-07-20 | End: 2017-07-20 | Stop reason: HOSPADM

## 2017-07-20 RX ORDER — ATORVASTATIN CALCIUM 10 MG/1
10 TABLET, FILM COATED ORAL DAILY
Status: DISCONTINUED | OUTPATIENT
Start: 2017-07-20 | End: 2017-07-20 | Stop reason: HOSPADM

## 2017-07-20 RX ORDER — FUROSEMIDE 20 MG/1
40 TABLET ORAL DAILY
Status: DISCONTINUED | OUTPATIENT
Start: 2017-07-20 | End: 2017-07-20 | Stop reason: HOSPADM

## 2017-07-20 RX ORDER — ACETAMINOPHEN 325 MG/1
650 TABLET ORAL EVERY 4 HOURS PRN
Status: DISCONTINUED | OUTPATIENT
Start: 2017-07-20 | End: 2017-07-20 | Stop reason: HOSPADM

## 2017-07-20 RX ORDER — TAMSULOSIN HYDROCHLORIDE 0.4 MG/1
0.4 CAPSULE ORAL DAILY
Status: DISCONTINUED | OUTPATIENT
Start: 2017-07-20 | End: 2017-07-20 | Stop reason: HOSPADM

## 2017-07-20 RX ORDER — HEPARIN SODIUM 1000 [USP'U]/ML
4000 INJECTION, SOLUTION INTRAVENOUS; SUBCUTANEOUS ONCE
Status: COMPLETED | OUTPATIENT
Start: 2017-07-20 | End: 2017-07-20

## 2017-07-20 RX ORDER — FERROUS SULFATE 325(65) MG
325 TABLET ORAL
Status: DISCONTINUED | OUTPATIENT
Start: 2017-07-20 | End: 2017-07-20 | Stop reason: HOSPADM

## 2017-07-20 RX ORDER — LOSARTAN POTASSIUM 50 MG/1
50 TABLET ORAL DAILY
Status: DISCONTINUED | OUTPATIENT
Start: 2017-07-20 | End: 2017-07-20 | Stop reason: HOSPADM

## 2017-07-20 RX ORDER — SPIRONOLACTONE 25 MG/1
25 TABLET ORAL DAILY
Status: DISCONTINUED | OUTPATIENT
Start: 2017-07-20 | End: 2017-07-20 | Stop reason: HOSPADM

## 2017-07-20 RX ORDER — PNV NO.95/FERROUS FUM/FOLIC AC 28MG-0.8MG
1 TABLET ORAL 2 TIMES DAILY
Qty: 30 TABLET | Refills: 0
Start: 2017-07-20 | End: 2018-01-01 | Stop reason: DRUGHIGH

## 2017-07-20 RX ORDER — SODIUM CHLORIDE 0.9 % (FLUSH) 0.9 %
10 SYRINGE (ML) INJECTION PRN
Status: DISCONTINUED | OUTPATIENT
Start: 2017-07-20 | End: 2017-07-20 | Stop reason: HOSPADM

## 2017-07-20 RX ORDER — CARVEDILOL 3.12 MG/1
3.12 TABLET ORAL 2 TIMES DAILY WITH MEALS
Qty: 60 TABLET | Refills: 0 | Status: SHIPPED | OUTPATIENT
Start: 2017-07-20 | End: 2018-01-01 | Stop reason: ALTCHOICE

## 2017-07-20 RX ORDER — DIGOXIN 125 MCG
125 TABLET ORAL DAILY
COMMUNITY
End: 2017-01-01 | Stop reason: ALTCHOICE

## 2017-07-20 RX ORDER — HEPARIN SODIUM 1000 [USP'U]/ML
4000 INJECTION, SOLUTION INTRAVENOUS; SUBCUTANEOUS PRN
Status: DISCONTINUED | OUTPATIENT
Start: 2017-07-20 | End: 2017-07-20 | Stop reason: HOSPADM

## 2017-07-20 RX ORDER — MULTIVITAMIN WITH FOLIC ACID 400 MCG
1 TABLET ORAL 2 TIMES DAILY
Status: DISCONTINUED | OUTPATIENT
Start: 2017-07-20 | End: 2017-07-20 | Stop reason: HOSPADM

## 2017-07-20 RX ORDER — SODIUM CHLORIDE 0.9 % (FLUSH) 0.9 %
10 SYRINGE (ML) INJECTION EVERY 12 HOURS SCHEDULED
Status: DISCONTINUED | OUTPATIENT
Start: 2017-07-20 | End: 2017-07-20 | Stop reason: HOSPADM

## 2017-07-20 RX ADMIN — FERROUS SULFATE TAB 325 MG (65 MG ELEMENTAL FE) 325 MG: 325 (65 FE) TAB at 09:33

## 2017-07-20 RX ADMIN — PSYLLIUM HUSK 0.52 G: 20 CAPSULE ORAL at 09:34

## 2017-07-20 RX ADMIN — HEPARIN SODIUM 4000 UNITS: 1000 INJECTION, SOLUTION INTRAVENOUS; SUBCUTANEOUS at 04:11

## 2017-07-20 RX ADMIN — TAMSULOSIN HYDROCHLORIDE 0.4 MG: 0.4 CAPSULE ORAL at 08:17

## 2017-07-20 RX ADMIN — OXYCODONE HYDROCHLORIDE AND ACETAMINOPHEN 500 MG: 500 TABLET ORAL at 08:16

## 2017-07-20 RX ADMIN — ATORVASTATIN CALCIUM 10 MG: 10 TABLET, FILM COATED ORAL at 09:34

## 2017-07-20 RX ADMIN — Medication 1 TABLET: at 09:34

## 2017-07-20 RX ADMIN — OXYCODONE HYDROCHLORIDE AND ACETAMINOPHEN 500 MG: 500 TABLET ORAL at 11:59

## 2017-07-20 RX ADMIN — LOSARTAN POTASSIUM 50 MG: 50 TABLET ORAL at 09:34

## 2017-07-20 RX ADMIN — FUROSEMIDE 40 MG: 20 TABLET ORAL at 09:34

## 2017-07-20 RX ADMIN — Medication 125 MCG: at 09:34

## 2017-07-20 RX ADMIN — MELATONIN 1000 UNITS: at 09:34

## 2017-07-20 RX ADMIN — HEPARIN SODIUM AND DEXTROSE 12 UNITS/KG/HR: 10000; 5 INJECTION INTRAVENOUS at 04:11

## 2017-07-20 RX ADMIN — SPIRONOLACTONE 25 MG: 25 TABLET ORAL at 09:34

## 2017-07-20 RX ADMIN — SERTRALINE HYDROCHLORIDE 100 MG: 50 TABLET ORAL at 09:34

## 2017-07-20 RX ADMIN — FERROUS SULFATE TAB 325 MG (65 MG ELEMENTAL FE) 325 MG: 325 (65 FE) TAB at 11:56

## 2017-07-20 RX ADMIN — Medication 10 ML: at 09:43

## 2017-07-20 ASSESSMENT — ENCOUNTER SYMPTOMS
EYE REDNESS: 0
ABDOMINAL PAIN: 0
FACIAL SWELLING: 0
EYE DISCHARGE: 0
SHORTNESS OF BREATH: 0
CHEST TIGHTNESS: 0
ABDOMINAL DISTENTION: 0

## 2017-07-20 ASSESSMENT — PAIN SCALES - GENERAL
PAINLEVEL_OUTOF10: 0

## 2017-07-21 ENCOUNTER — CARE COORDINATION (OUTPATIENT)
Dept: CASE MANAGEMENT | Age: 82
End: 2017-07-21

## 2017-07-26 PROBLEM — I25.10 CORONARY ARTERY DISEASE DUE TO LIPID RICH PLAQUE: Status: ACTIVE | Noted: 2017-07-26

## 2017-07-26 PROBLEM — I25.83 CORONARY ARTERY DISEASE DUE TO LIPID RICH PLAQUE: Status: ACTIVE | Noted: 2017-07-26

## 2017-07-27 ENCOUNTER — OFFICE VISIT (OUTPATIENT)
Dept: INTERNAL MEDICINE | Age: 82
End: 2017-07-27
Payer: MEDICARE

## 2017-07-27 VITALS
DIASTOLIC BLOOD PRESSURE: 60 MMHG | RESPIRATION RATE: 20 BRPM | HEART RATE: 76 BPM | HEIGHT: 69 IN | WEIGHT: 156 LBS | BODY MASS INDEX: 23.11 KG/M2 | SYSTOLIC BLOOD PRESSURE: 90 MMHG

## 2017-07-27 DIAGNOSIS — I10 ESSENTIAL HYPERTENSION: ICD-10-CM

## 2017-07-27 DIAGNOSIS — E78.5 HYPERLIPIDEMIA, UNSPECIFIED HYPERLIPIDEMIA TYPE: ICD-10-CM

## 2017-07-27 DIAGNOSIS — I25.10 CORONARY ARTERY DISEASE DUE TO LIPID RICH PLAQUE: Primary | ICD-10-CM

## 2017-07-27 DIAGNOSIS — I25.83 CORONARY ARTERY DISEASE DUE TO LIPID RICH PLAQUE: Primary | ICD-10-CM

## 2017-07-27 DIAGNOSIS — I50.9 CONGESTIVE HEART FAILURE, UNSPECIFIED CONGESTIVE HEART FAILURE CHRONICITY, UNSPECIFIED CONGESTIVE HEART FAILURE TYPE: ICD-10-CM

## 2017-07-27 PROCEDURE — 99495 TRANSJ CARE MGMT MOD F2F 14D: CPT | Performed by: INTERNAL MEDICINE

## 2017-07-27 ASSESSMENT — ENCOUNTER SYMPTOMS
SHORTNESS OF BREATH: 0
VOMITING: 0
BACK PAIN: 0
DIARRHEA: 0
COUGH: 0
ABDOMINAL PAIN: 0
BLOOD IN STOOL: 0
NAUSEA: 0
EYE PAIN: 0
CONSTIPATION: 0

## 2017-07-31 ENCOUNTER — TELEPHONE (OUTPATIENT)
Dept: INTERNAL MEDICINE | Age: 82
End: 2017-07-31

## 2017-08-01 ENCOUNTER — TELEPHONE (OUTPATIENT)
Dept: CARDIOLOGY | Age: 82
End: 2017-08-01

## 2017-08-02 ENCOUNTER — TELEPHONE (OUTPATIENT)
Dept: INTERNAL MEDICINE | Age: 82
End: 2017-08-02

## 2017-08-10 ENCOUNTER — PROCEDURE VISIT (OUTPATIENT)
Dept: CARDIOLOGY | Age: 82
End: 2017-08-10
Payer: MEDICARE

## 2017-08-10 DIAGNOSIS — R07.9 CHEST PAIN, UNSPECIFIED TYPE: Primary | ICD-10-CM

## 2017-08-10 PROCEDURE — 93015 CV STRESS TEST SUPVJ I&R: CPT | Performed by: INTERNAL MEDICINE

## 2017-08-10 RX ORDER — AMINOPHYLLINE DIHYDRATE 25 MG/ML
100 INJECTION, SOLUTION INTRAVENOUS ONCE
Status: COMPLETED | OUTPATIENT
Start: 2017-08-10 | End: 2017-08-10

## 2017-08-10 RX ADMIN — AMINOPHYLLINE DIHYDRATE 100 MG: 25 INJECTION, SOLUTION INTRAVENOUS at 10:33

## 2017-10-02 NOTE — PROGRESS NOTES
Tatyana Balderas received counseling on the following healthy behaviors: medication adherence  Reviewed prior labs and health maintenance  Continue current medications except where noted below, diet and exercise. Discussed use, benefit, and side effects of prescribed medications. Barriers to medication compliance addressed. Patient given educational materials - see patient instructions  Was a self-tracking handout given in paper form or via SANpulse Technologieshart? No    Requested Prescriptions      No prescriptions requested or ordered in this encounter       All patient questions answered. Patient voiced understanding. Quality Measures    Body mass index is 22.59 kg/(m^2). Normal. Weight control planned discussed: healthy diet and regular exercise. BP: 92/64. Blood pressure is normal. Treatment plan consists of: see progress note below. Fall Risk 5/12/2017 5/5/2015 4/28/2014   2 or more falls in past year? no no no   Fall with injury in past year? no no yes     The patient does a history of falls. I did not - not indicated , complete a risk assessment for falls.  A plan of care for falls home safety tips provided    Lab Results   Component Value Date    LDLCHOLESTEROL 41 05/09/2017    (goal LDL reduction with dx if diabetes is 50% LDL reduction)    PHQ Scores 5/12/2017 3/21/2016 12/17/2014 11/26/2013   PHQ2 Score 0 0 0 0   PHQ9 Score 0 0 0 0     Interpretation of Total Score Depression Severity: 1-4 = Minimal depression, 5-9 = Mild depression, 10-14 = Moderate depression, 15-19 = Moderately severe depression, 20-27 = Severe depression

## 2017-10-02 NOTE — PROGRESS NOTES
Normal range of motion. He exhibits no edema. Lymphadenopathy:     He has no cervical adenopathy. Neurological: He is alert and oriented to person, place, and time. No cranial nerve deficit (grossly). Skin: Skin is warm and dry. No rash noted. Psychiatric: He has a normal mood and affect. Thought content normal.   Vitals reviewed. BP 92/64 (Site: Left Arm, Position: Sitting, Cuff Size: Large Adult)  Pulse 84  Resp 20  Ht 5' 9\" (1.753 m)  Wt 153 lb (69.4 kg)  BMI 22.59 kg/m2    Assessment:      1. Essential hypertension  Hemoglobin A1C    Hemoglobin    Iron And TIBC   2. Hyperlipidemia, unspecified hyperlipidemia type  Hemoglobin A1C   3. Congestive heart failure, unspecified congestive heart failure chronicity, unspecified congestive heart failure type (HCC)  Handicap placard   4. Type 2 diabetes mellitus without complication, without long-term current use of insulin (HCC)  Hemoglobin A1C    Hemoglobin    Iron And TIBC   5. History of prostate cancer  PSA, Diagnostic   6. Anemia, unspecified type  Hemoglobin    Iron And TIBC      Blood pressure hyperlipidemia and CHF controlled. Continue current regimen. Patient did have a 1017 cardiac nuclear stress test see cardiology results. Patient does follow up with cardiologist       Plan:      Return in about 3 months (around 1/2/2018) for Hypertension, Hyperlipidemia, CHF.     Patient has cane, but suggested he use a walker for longer distances, etc.  Orders Placed This Encounter   Procedures    Handicap placard    INFLUENZA, HIGH DOSE, 65 YRS +, IM, PF, PREFILL SYR, 0.5ML (FLUZONE HD)    PSA, Diagnostic     Standing Status:   Future     Standing Expiration Date:   10/2/2018    Hemoglobin A1C     Standing Status:   Future     Standing Expiration Date:   10/2/2018    Hemoglobin     Standing Status:   Future     Standing Expiration Date:   10/2/2018    Iron And TIBC     Standing Status:   Future     Standing Expiration Date:   10/2/2018     Order

## 2017-10-02 NOTE — PATIENT INSTRUCTIONS
Preventing Falls: Care Instructions  Your Care Instructions  Getting around your home safely can be a challenge if you have injuries or health problems that make it easy for you to fall. Loose rugs and furniture in walkways are among the dangers for many older people who have problems walking or who have poor eyesight. People who have conditions such as arthritis, osteoporosis, or dementia also have to be careful not to fall. You can make your home safer with a few simple measures. Follow-up care is a key part of your treatment and safety. Be sure to make and go to all appointments, and call your doctor if you are having problems. It's also a good idea to know your test results and keep a list of the medicines you take. How can you care for yourself at home? Taking care of yourself  · You may get dizzy if you do not drink enough water. To prevent dehydration, drink plenty of fluids, enough so that your urine is light yellow or clear like water. Choose water and other caffeine-free clear liquids. If you have kidney, heart, or liver disease and have to limit fluids, talk with your doctor before you increase the amount of fluids you drink. · Exercise regularly to improve your strength, muscle tone, and balance. Walk if you can. Swimming may be a good choice if you cannot walk easily. · Have your vision and hearing checked each year or any time you notice a change. If you have trouble seeing and hearing, you might not be able to avoid objects and could lose your balance. · Know the side effects of the medicines you take. Ask your doctor or pharmacist whether the medicines you take can affect your balance. Sleeping pills or sedatives can affect your balance. · Limit the amount of alcohol you drink. Alcohol can impair your balance and other senses. · Ask your doctor whether calluses or corns on your feet need to be removed.  If you wear loose-fitting shoes because of calluses or corns, you can lose your you to sit while showering. · Get into a tub or shower by putting the weaker leg in first. Get out of a tub or shower with your strong side first.  · Repair loose toilet seats and consider installing a raised toilet seat to make getting on and off the toilet easier. · Keep your bathroom door unlocked while you are in the shower. Where can you learn more? Go to https://Resource GurupePeppercoineb.[employer]. org and sign in to your [employer] account. Enter [phone] [id number] [phone] in the [employer] box to learn more about \"Preventing Falls: Care Instructions. \"     If you do not have an account, please click on the \"Sign Up Now\" link. Current as of: August 4, 2016  Content Version: 11.3  © 2634-9730 [employer]orporated. Care instructions adapted under license by [facility] ([facility]). If you have questions about a medical condition or this instruction, always ask your healthcare professional. [place] [address] any warranty or liability for your use of this information.

## 2017-10-02 NOTE — MR AVS SNAPSHOT
· Limit the amount of alcohol you drink. Alcohol can impair your balance and other senses. · Ask your doctor whether calluses or corns on your feet need to be removed. If you wear loose-fitting shoes because of calluses or corns, you can lose your balance and fall. · Talk to your doctor if you have numbness in your feet. Preventing falls at home  · Remove raised doorway thresholds, throw rugs, and clutter. Repair loose carpet or raised areas in the floor. · Move furniture and electrical cords to keep them out of walking paths. · Use nonskid floor wax, and wipe up spills right away, especially on ceramic tile floors. · If you use a walker or cane, put rubber tips on it. If you use crutches, clean the bottoms of them regularly with an abrasive pad, such as steel wool. · Keep your house well lit, especially Verlena Herb, and outside walkways. Use night-lights in areas such as hallways and bathrooms. Add extra light switches or use remote switches (such as switches that go on or off when you clap your hands) to make it easier to turn lights on if you have to get up during the night. · Install sturdy handrails on stairways. · Move items in your cabinets so that the things you use a lot are on the lower shelves (about waist level). · Keep a cordless phone and a flashlight with new batteries by your bed. If possible, put a phone in each of the main rooms of your house, or carry a cell phone in case you fall and cannot reach a phone. Or, you can wear a device around your neck or wrist. You push a button that sends a signal for help. · Wear low-heeled shoes that fit well and give your feet good support. Use footwear with nonskid soles. Check the heels and soles of your shoes for wear. Repair or replace worn heels or soles. · Do not wear socks without shoes on wood floors. · Walk on the grass when the sidewalks are slippery.  If you live in an area that gets snow and ice in the winter, sprinkle salt on slippery steps and sidewalks. Preventing falls in the bath  · Install grab bars and nonskid mats inside and outside your shower or tub and near the toilet and sinks. · Use shower chairs and bath benches. · Use a hand-held shower head that will allow you to sit while showering. · Get into a tub or shower by putting the weaker leg in first. Get out of a tub or shower with your strong side first.  · Repair loose toilet seats and consider installing a raised toilet seat to make getting on and off the toilet easier. · Keep your bathroom door unlocked while you are in the shower. Where can you learn more? Go to https://Bux180peBlendeb.Realie. org and sign in to your [employer] account. Enter [phone] in the [facility] box to learn more about \"Preventing Falls: Care Instructions. \"     If you do not have an account, please click on the \"Sign Up Now\" link. Current as of: August 4, 2016  Content Version: 11.3  © 7074-5733 [employer]. Care instructions adapted under license by [facility] ([facility]). If you have questions about a medical condition or this instruction, always ask your healthcare professional. [address] any warranty or liability for your use of this information.               Medications and Orders      Your Current Medications Are              sacubitril-valsartan (ENTRESTO) 24-26 MG per tablet Take 1 tablet by mouth 2 times daily    carvedilol (COREG) 3.125 MG tablet Take 1 tablet by mouth 2 times daily (with meals)    Ferrous Sulfate (IRON) 325 (65 Fe) MG TABS Take 1 tablet by mouth 2 times daily    atorvastatin (LIPITOR) 10 MG tablet TAKE 1 TABLET BY MOUTH ONE TIME A DAY     metFORMIN (GLUCOPHAGE) 500 MG tablet TAKE 1 TABLET BY MOUTH THREE TIMES A DAY     warfarin (COUMADIN) 1 MG tablet 2 mg Thurs, 4 mg all other days or as directed per INR spironolactone (ALDACTONE) 25 MG tablet TAKE ONE TABLET BY MOUTH ONCE DAILY    nitroGLYCERIN (NITROSTAT) 0.4 MG SL tablet Place 1 tablet under the tongue every 5 minutes as needed for Chest pain    Ascorbic Acid (VITAMIN C) 500 MG tablet Take 500 mg by mouth 3 times daily    warfarin (COUMADIN) 1 MG tablet Take 1 mg by mouth daily Or as directed per INR    furosemide (LASIX) 20 MG tablet Take 2 tablets by mouth daily    warfarin (COUMADIN) 4 MG tablet Take 1 tablet by mouth daily    Cholecalciferol (VITAMIN D3) 1000 UNITS TABS Take 1,000 Units by mouth daily    Multiple Vitamins-Minerals (PRESERVISION AREDS 2) CAPS Take 1 capsule by mouth 2 times daily    Psyllium (METAMUCIL PO) Take by mouth daily     Handicap Placard MISC by Does not apply route / 5 years    CPAP Machine MISC CPAP Machine. Use nightly    sertraline (ZOLOFT) 100 MG tablet Take 100 mg by mouth daily. tamsulosin (FLOMAX) 0.4 MG capsule Take 0.4 mg by mouth daily.       Allergies              Bextra [Valdecoxib] Other (See Comments)    CONFUSION    Biaxin [Clarithromycin]     Vasotec [Enalapril] Other (See Comments)    COUGH      We Ordered/Performed the following           Handicap placard     INFLUENZA, HIGH DOSE, 65 YRS +, IM, PF, PREFILL SYR, 0.5ML (FLUZONE HD)          Additional Information        Basic Information     Date Of Birth Sex Race Ethnicity Preferred Language    7/3/1929 Male White Non-/Non  English      Problem List as of 10/2/2017  Date Reviewed: 10/2/2017                Coronary artery disease due to lipid rich plaque    Chest pain    NSTEMI (non-ST elevated myocardial infarction) ([facility] [address]    Non-ischemic cardiomyopathy (HCC)    PAF (paroxysmal atrial fibrillation) (HCC)    DM (diabetes mellitus), type 2 (HCC)    Gait abnormality    Chronic right sacroiliac joint pain    Radiculopathy    Lumbar spinal stenosis    H/O falling    Head injury    Hearing impaired    Left carpal tunnel syndrome    Osteoarthritis

## 2017-10-02 NOTE — PROGRESS NOTES
Chronic Disease Visit Information    BP Readings from Last 3 Encounters:   10/02/17 92/64   07/27/17 90/60   07/20/17 (!) 104/53          Hemoglobin A1C (%)   Date Value   05/09/2017 7.3 (H)   11/03/2016 6.9 (H)   05/06/2016 6.8 (H)     Microalb/Crt. Ratio (mcg/mg creat)   Date Value   05/09/2017 12     LDL Cholesterol (mg/dL)   Date Value   05/09/2017 41     HDL (mg/dL)   Date Value   05/09/2017 36 (L)     BUN (mg/dL)   Date Value   07/20/2017 39 (H)     CREATININE (mg/dL)   Date Value   07/20/2017 1.34 (H)     Glucose (mg/dL)   Date Value   07/20/2017 126 (H)            Have you changed or started any medications since your last visit including any over-the-counter medicines, vitamins, or herbal medicines? yes - Entresto- Cardio   Are you having any side effects from any of your medications? -  no  Have you stopped taking any of your medications? Is so, why? -  yes - Digoxin, Cozaar    Have you seen any other physician or provider since your last visit? Yes - Records Obtained  Have you had any other diagnostic tests since your last visit? Yes - Records Obtained  Have you been seen in the emergency room and/or had an admission to a hospital since we last saw you? No  Have you had your annual diabetic retinal (eye) exam? No/ NA  Have you had your routine dental cleaning in the past 6 months? Yes- 1 1/2 weeks ago. Every 6 months    Have you activated your [employer] account? If not, what are your barriers?  No: no interest     Patient Care Team:  Lisseth Parry MD as PCP - General (Internal Medicine)  Lisseth Parry MD as PCP - S Attributed Provider  Chavo Ferguson DO as Consulting Physician (Cardiology)  Marcelina Pickens MD (Ophthalmology)         Medical History Review  Past Medical, Family, and Social History reviewed and does contribute to the patient presenting condition    Health Maintenance   Topic Date Due    Flu vaccine (1) 09/01/2017    DTaP/Tdap/Td vaccine (1 - Tdap) 05/12/2018 (Originally 2/26/2014)    Zostavax vaccine  Completed    Pneumococcal low/med risk  Completed

## 2017-10-09 NOTE — PROGRESS NOTES
ANTICOAGULATION SERVICE    Date of Clinic Visit:  10/9/2017    Berkley Gonzalez is a 80 y.o. male who presents to clinic today for anticoagulation monitoring and adjustment. Recent INR Results:  Internal QC passed  Lab Results   Component Value Date    INR 1.6 10/09/2017    INR 2 09/11/2017       Current Warfarin Dosage:  Dosing Plan  As of 10/9/2017    TTR:   81.7 % (4.1 y)   Full instructions:   10/9: 6 mg; Otherwise 2 mg on Thu; 4 mg all other days               Assessment/Plan:    Modify warfarin dose as noted above: INR low today. Increase dose 8% and put back on old dose. Next Clinic Appointment:  Return date  As of 10/9/2017    TTR:   81.7 % (4.1 y)   Next INR check:   11/6/2017             Please call [facility] at [phone] with any questions. Thanks!   Sandra Raines [facility]  Anticoagulation Service Pharmacist  10/9/2017 4:59 PM

## 2017-12-13 NOTE — PROGRESS NOTES
ANTICOAGULATION SERVICE    Date of Clinic Visit:  12/13/2017    Gilberto Davis is a 80 y.o. male who presents to clinic today for anticoagulation monitoring and adjustment. Recent INR Results:  Internal QC passed  Lab Results   Component Value Date    INR 3.5 12/13/2017    INR 1.8 11/07/2017       Current Warfarin Dosage:  Dosing Plan  As of 12/13/2017    TTR:   79.7 % (4.3 y)   Full instructions:   12/13: Hold; Otherwise 2 mg on Wed; 4 mg all other days               Assessment/Plan:    Modify warfarin dose as noted above: INR high after dose increase at last visit. Will decrease dose back down 7%. Recheck 4 weeks. Next Clinic Appointment:  Return date  As of 12/13/2017    TTR:   79.7 % (4.3 y)   Next INR check:   1/9/2018             Please call [facility] at [phone] with any questions. Thanks!   Louis Trammell, [facility] [place]  Anticoagulation Service Pharmacist  12/13/2017 11:04 AM

## 2018-01-01 ENCOUNTER — CARE COORDINATION (OUTPATIENT)
Dept: CASE MANAGEMENT | Age: 83
End: 2018-01-01

## 2018-01-01 ENCOUNTER — APPOINTMENT (OUTPATIENT)
Dept: GENERAL RADIOLOGY | Age: 83
DRG: 291 | End: 2018-01-01
Payer: MEDICARE

## 2018-01-01 ENCOUNTER — HOSPITAL ENCOUNTER (OUTPATIENT)
Age: 83
Setting detail: SPECIMEN
Discharge: HOME | End: 2018-02-01
Payer: MEDICARE

## 2018-01-01 ENCOUNTER — APPOINTMENT (OUTPATIENT)
Dept: CT IMAGING | Age: 83
End: 2018-01-01
Payer: MEDICARE

## 2018-01-01 ENCOUNTER — TELEPHONE (OUTPATIENT)
Dept: FAMILY MEDICINE CLINIC | Age: 83
End: 2018-01-01

## 2018-01-01 ENCOUNTER — TELEPHONE (OUTPATIENT)
Dept: INTERNAL MEDICINE | Age: 83
End: 2018-01-01

## 2018-01-01 ENCOUNTER — TELEPHONE (OUTPATIENT)
Dept: INTERNAL MEDICINE | Age: 83
End: 2018-01-01
Payer: MEDICARE

## 2018-01-01 ENCOUNTER — CARE COORDINATION (OUTPATIENT)
Dept: CARE COORDINATION | Age: 83
End: 2018-01-01

## 2018-01-01 ENCOUNTER — HOSPITAL ENCOUNTER (INPATIENT)
Age: 83
LOS: 3 days | Discharge: HOME HEALTH | DRG: 291 | End: 2018-05-17
Attending: EMERGENCY MEDICINE | Admitting: INTERNAL MEDICINE
Payer: MEDICARE

## 2018-01-01 ENCOUNTER — HOSPITAL ENCOUNTER (OUTPATIENT)
Dept: PHARMACY | Age: 83
Setting detail: THERAPIES SERIES
Discharge: HOME | End: 2018-04-13
Payer: MEDICARE

## 2018-01-01 ENCOUNTER — OFFICE VISIT (OUTPATIENT)
Dept: INTERNAL MEDICINE | Age: 83
End: 2018-01-01
Payer: MEDICARE

## 2018-01-01 ENCOUNTER — HOSPITAL ENCOUNTER (OUTPATIENT)
Dept: GENERAL RADIOLOGY | Age: 83
Discharge: HOME | DRG: 291 | End: 2018-02-21
Payer: MEDICARE

## 2018-01-01 ENCOUNTER — HOSPITAL ENCOUNTER (OUTPATIENT)
Dept: LAB | Age: 83
Setting detail: SPECIMEN
Discharge: HOME | DRG: 291 | End: 2018-01-23
Payer: MEDICARE

## 2018-01-01 ENCOUNTER — HOSPITAL ENCOUNTER (INPATIENT)
Age: 83
LOS: 3 days | Discharge: NURSING, REHAB OR LONG-TERM CARE | DRG: 291 | End: 2018-01-26
Attending: EMERGENCY MEDICINE | Admitting: FAMILY MEDICINE
Payer: MEDICARE

## 2018-01-01 ENCOUNTER — HOSPITAL ENCOUNTER (OUTPATIENT)
Age: 83
Setting detail: SPECIMEN
Discharge: HOME | End: 2018-01-29
Payer: MEDICARE

## 2018-01-01 ENCOUNTER — HOSPITAL ENCOUNTER (OUTPATIENT)
Dept: LAB | Age: 83
Setting detail: SPECIMEN
Discharge: HOME | End: 2018-01-03
Payer: MEDICARE

## 2018-01-01 ENCOUNTER — HOSPITAL ENCOUNTER (OUTPATIENT)
Dept: PHARMACY | Age: 83
Discharge: HOME | End: 2018-03-29

## 2018-01-01 ENCOUNTER — ANTI-COAG VISIT (OUTPATIENT)
Dept: INTERNAL MEDICINE | Age: 83
End: 2018-01-01

## 2018-01-01 ENCOUNTER — HOSPITAL ENCOUNTER (OUTPATIENT)
Dept: GENERAL RADIOLOGY | Age: 83
Discharge: HOME | End: 2018-05-20
Payer: MEDICARE

## 2018-01-01 ENCOUNTER — HOSPITAL ENCOUNTER (OUTPATIENT)
Dept: PHARMACY | Age: 83
Discharge: HOME | End: 2018-03-16

## 2018-01-01 ENCOUNTER — HOSPITAL ENCOUNTER (OUTPATIENT)
Age: 83
Setting detail: SPECIMEN
Discharge: HOME | End: 2018-02-07
Payer: MEDICARE

## 2018-01-01 ENCOUNTER — APPOINTMENT (OUTPATIENT)
Dept: CT IMAGING | Age: 83
DRG: 291 | End: 2018-01-01
Payer: MEDICARE

## 2018-01-01 ENCOUNTER — TELEPHONE (OUTPATIENT)
Dept: PHARMACY | Age: 83
End: 2018-01-01

## 2018-01-01 ENCOUNTER — HOSPITAL ENCOUNTER (EMERGENCY)
Age: 83
Discharge: HOME | End: 2018-05-22
Attending: EMERGENCY MEDICINE
Payer: MEDICARE

## 2018-01-01 ENCOUNTER — HOSPITAL ENCOUNTER (EMERGENCY)
Age: 83
Discharge: HOME | End: 2018-05-07
Attending: EMERGENCY MEDICINE
Payer: MEDICARE

## 2018-01-01 ENCOUNTER — HOSPITAL ENCOUNTER (OUTPATIENT)
Dept: LAB | Age: 83
Setting detail: SPECIMEN
Discharge: HOME | DRG: 291 | End: 2018-02-19
Payer: MEDICARE

## 2018-01-01 ENCOUNTER — TELEPHONE (OUTPATIENT)
Dept: PHARMACY | Facility: CLINIC | Age: 83
End: 2018-01-01

## 2018-01-01 ENCOUNTER — HOSPITAL ENCOUNTER (OUTPATIENT)
Age: 83
Setting detail: SPECIMEN
Discharge: HOME | End: 2018-01-30
Payer: MEDICARE

## 2018-01-01 ENCOUNTER — OFFICE VISIT (OUTPATIENT)
Dept: PRIMARY CARE CLINIC | Age: 83
End: 2018-01-01

## 2018-01-01 ENCOUNTER — APPOINTMENT (OUTPATIENT)
Dept: GENERAL RADIOLOGY | Age: 83
DRG: 304 | End: 2018-01-01
Payer: MEDICARE

## 2018-01-01 ENCOUNTER — HOSPITAL ENCOUNTER (OUTPATIENT)
Dept: SPEECH THERAPY | Age: 83
Setting detail: THERAPIES SERIES
Discharge: HOME | End: 2018-01-22
Payer: MEDICARE

## 2018-01-01 ENCOUNTER — HOSPITAL ENCOUNTER (OUTPATIENT)
Dept: SPEECH THERAPY | Age: 83
Setting detail: THERAPIES SERIES
Discharge: HOME | End: 2018-01-26
Payer: MEDICARE

## 2018-01-01 ENCOUNTER — HOSPITAL ENCOUNTER (OUTPATIENT)
Dept: PHARMACY | Age: 83
Discharge: HOME | End: 2018-05-10

## 2018-01-01 ENCOUNTER — HOSPITAL ENCOUNTER (OUTPATIENT)
Dept: PHARMACY | Age: 83
Setting detail: THERAPIES SERIES
Discharge: HOME | End: 2018-01-09
Payer: MEDICARE

## 2018-01-01 ENCOUNTER — HOSPITAL ENCOUNTER (OUTPATIENT)
Age: 83
Setting detail: SPECIMEN
Discharge: HOME | End: 2018-01-27
Payer: MEDICARE

## 2018-01-01 ENCOUNTER — OUTSIDE SERVICES (OUTPATIENT)
Dept: PRIMARY CARE CLINIC | Age: 83
End: 2018-01-01
Payer: MEDICARE

## 2018-01-01 ENCOUNTER — OFFICE VISIT (OUTPATIENT)
Dept: ORTHOPEDIC SURGERY | Age: 83
End: 2018-01-01
Payer: MEDICARE

## 2018-01-01 ENCOUNTER — HOSPITAL ENCOUNTER (INPATIENT)
Age: 83
LOS: 2 days | Discharge: HOME | DRG: 291 | End: 2018-05-28
Attending: EMERGENCY MEDICINE | Admitting: FAMILY MEDICINE
Payer: MEDICARE

## 2018-01-01 ENCOUNTER — APPOINTMENT (OUTPATIENT)
Dept: GENERAL RADIOLOGY | Age: 83
End: 2018-01-01
Payer: MEDICARE

## 2018-01-01 ENCOUNTER — HOSPITAL ENCOUNTER (OUTPATIENT)
Dept: SPEECH THERAPY | Age: 83
Setting detail: THERAPIES SERIES
Discharge: HOME | End: 2018-04-05
Payer: COMMERCIAL

## 2018-01-01 ENCOUNTER — HOSPITAL ENCOUNTER (OUTPATIENT)
Age: 83
Setting detail: SPECIMEN
Discharge: HOME | End: 2018-01-31
Payer: MEDICARE

## 2018-01-01 ENCOUNTER — HOSPITAL ENCOUNTER (INPATIENT)
Age: 83
LOS: 1 days | Discharge: NURSING, REHAB OR LONG-TERM CARE | DRG: 304 | End: 2018-06-07
Attending: SPECIALIST | Admitting: FAMILY MEDICINE
Payer: MEDICARE

## 2018-01-01 ENCOUNTER — HOSPITAL ENCOUNTER (OUTPATIENT)
Age: 83
Setting detail: SPECIMEN
Discharge: HOME | End: 2018-05-21
Payer: MEDICARE

## 2018-01-01 ENCOUNTER — HOSPITAL ENCOUNTER (OUTPATIENT)
Dept: PHARMACY | Age: 83
Discharge: HOME | End: 2018-05-21

## 2018-01-01 ENCOUNTER — HOSPITAL ENCOUNTER (OUTPATIENT)
Dept: PHARMACY | Age: 83
Discharge: HOME | End: 2018-05-04

## 2018-01-01 ENCOUNTER — HOSPITAL ENCOUNTER (OUTPATIENT)
Age: 83
Setting detail: SPECIMEN
Discharge: HOME | End: 2018-02-14
Payer: MEDICARE

## 2018-01-01 ENCOUNTER — HOSPITAL ENCOUNTER (INPATIENT)
Age: 83
LOS: 2 days | Discharge: NURSING, REHAB OR LONG-TERM CARE | DRG: 291 | End: 2018-02-21
Attending: EMERGENCY MEDICINE | Admitting: INTERNAL MEDICINE
Payer: MEDICARE

## 2018-01-01 ENCOUNTER — HOSPITAL ENCOUNTER (OUTPATIENT)
Age: 83
Setting detail: SPECIMEN
Discharge: HOME | End: 2018-01-17
Payer: MEDICARE

## 2018-01-01 ENCOUNTER — HOSPITAL ENCOUNTER (OUTPATIENT)
Age: 83
Setting detail: OBSERVATION
Discharge: HOME | End: 2018-05-06
Attending: SPECIALIST | Admitting: FAMILY MEDICINE
Payer: MEDICARE

## 2018-01-01 ENCOUNTER — HOSPITAL ENCOUNTER (OUTPATIENT)
Dept: PHARMACY | Age: 83
Discharge: HOME | End: 2018-03-02

## 2018-01-01 ENCOUNTER — HOSPITAL ENCOUNTER (OUTPATIENT)
Dept: GENERAL RADIOLOGY | Age: 83
Discharge: HOME | End: 2018-04-07
Payer: MEDICARE

## 2018-01-01 ENCOUNTER — OFFICE VISIT (OUTPATIENT)
Dept: PODIATRY | Age: 83
End: 2018-01-01
Payer: MEDICARE

## 2018-01-01 ENCOUNTER — ANTI-COAG VISIT (OUTPATIENT)
Dept: FAMILY MEDICINE CLINIC | Age: 83
End: 2018-01-01
Payer: MEDICARE

## 2018-01-01 ENCOUNTER — HOSPITAL ENCOUNTER (OUTPATIENT)
Dept: GENERAL RADIOLOGY | Age: 83
Discharge: HOME | End: 2018-01-18
Payer: MEDICARE

## 2018-01-01 ENCOUNTER — HOSPITAL ENCOUNTER (EMERGENCY)
Age: 83
Discharge: HOME | End: 2018-03-16
Attending: EMERGENCY MEDICINE
Payer: MEDICARE

## 2018-01-01 ENCOUNTER — HOSPITAL ENCOUNTER (OUTPATIENT)
Dept: SPEECH THERAPY | Age: 83
Setting detail: THERAPIES SERIES
Discharge: HOME | End: 2018-01-18
Payer: MEDICARE

## 2018-01-01 ENCOUNTER — HOSPITAL ENCOUNTER (OUTPATIENT)
Dept: PHARMACY | Age: 83
Setting detail: THERAPIES SERIES
Discharge: HOME | End: 2018-01-29
Payer: MEDICARE

## 2018-01-01 ENCOUNTER — TELEPHONE (OUTPATIENT)
Dept: PRIMARY CARE CLINIC | Age: 83
End: 2018-01-01

## 2018-01-01 ENCOUNTER — HOSPITAL ENCOUNTER (OUTPATIENT)
Dept: PHARMACY | Age: 83
Discharge: HOME | End: 2018-04-20

## 2018-01-01 ENCOUNTER — OUTSIDE SERVICES (OUTPATIENT)
Dept: INTERNAL MEDICINE | Age: 83
End: 2018-01-01
Payer: MEDICARE

## 2018-01-01 ENCOUNTER — HOSPITAL ENCOUNTER (OUTPATIENT)
Dept: PHARMACY | Age: 83
Setting detail: THERAPIES SERIES
Discharge: HOME | End: 2018-01-19
Payer: MEDICARE

## 2018-01-01 ENCOUNTER — HOSPITAL ENCOUNTER (OUTPATIENT)
Dept: PHARMACY | Age: 83
Discharge: HOME | End: 2018-03-09

## 2018-01-01 ENCOUNTER — HOSPITAL ENCOUNTER (OUTPATIENT)
Dept: PHARMACY | Age: 83
Discharge: HOME | End: 2018-05-07

## 2018-01-01 ENCOUNTER — OFFICE VISIT (OUTPATIENT)
Dept: INTERNAL MEDICINE | Age: 83
End: 2018-01-01

## 2018-01-01 ENCOUNTER — HOSPITAL ENCOUNTER (OUTPATIENT)
Age: 83
Setting detail: SPECIMEN
Discharge: HOME | End: 2018-02-05
Payer: MEDICARE

## 2018-01-01 VITALS
HEIGHT: 69 IN | OXYGEN SATURATION: 98 % | RESPIRATION RATE: 20 BRPM | HEART RATE: 78 BPM | SYSTOLIC BLOOD PRESSURE: 77 MMHG | WEIGHT: 165.2 LBS | TEMPERATURE: 97.6 F | DIASTOLIC BLOOD PRESSURE: 51 MMHG | BODY MASS INDEX: 24.47 KG/M2

## 2018-01-01 VITALS
HEART RATE: 75 BPM | DIASTOLIC BLOOD PRESSURE: 57 MMHG | TEMPERATURE: 97.7 F | RESPIRATION RATE: 16 BRPM | HEIGHT: 69 IN | SYSTOLIC BLOOD PRESSURE: 93 MMHG | OXYGEN SATURATION: 100 % | WEIGHT: 161.6 LBS | BODY MASS INDEX: 23.93 KG/M2

## 2018-01-01 VITALS
DIASTOLIC BLOOD PRESSURE: 70 MMHG | HEART RATE: 78 BPM | SYSTOLIC BLOOD PRESSURE: 96 MMHG | TEMPERATURE: 98.6 F | OXYGEN SATURATION: 100 % | RESPIRATION RATE: 20 BRPM

## 2018-01-01 VITALS
DIASTOLIC BLOOD PRESSURE: 60 MMHG | HEART RATE: 80 BPM | WEIGHT: 157 LBS | BODY MASS INDEX: 22.48 KG/M2 | SYSTOLIC BLOOD PRESSURE: 90 MMHG | HEIGHT: 70 IN | RESPIRATION RATE: 16 BRPM

## 2018-01-01 VITALS
TEMPERATURE: 96.1 F | RESPIRATION RATE: 16 BRPM | HEART RATE: 72 BPM | BODY MASS INDEX: 25.2 KG/M2 | HEIGHT: 70 IN | WEIGHT: 176 LBS | OXYGEN SATURATION: 99 % | DIASTOLIC BLOOD PRESSURE: 60 MMHG | SYSTOLIC BLOOD PRESSURE: 96 MMHG

## 2018-01-01 VITALS
TEMPERATURE: 96.9 F | HEART RATE: 80 BPM | WEIGHT: 169.4 LBS | HEIGHT: 70 IN | TEMPERATURE: 96.3 F | HEART RATE: 88 BPM | BODY MASS INDEX: 25.03 KG/M2 | DIASTOLIC BLOOD PRESSURE: 54 MMHG | HEIGHT: 69 IN | DIASTOLIC BLOOD PRESSURE: 56 MMHG | RESPIRATION RATE: 14 BRPM | BODY MASS INDEX: 24.25 KG/M2 | OXYGEN SATURATION: 99 % | WEIGHT: 169 LBS | OXYGEN SATURATION: 96 % | SYSTOLIC BLOOD PRESSURE: 98 MMHG | RESPIRATION RATE: 16 BRPM | SYSTOLIC BLOOD PRESSURE: 78 MMHG

## 2018-01-01 VITALS
TEMPERATURE: 97.4 F | OXYGEN SATURATION: 97 % | SYSTOLIC BLOOD PRESSURE: 86 MMHG | RESPIRATION RATE: 14 BRPM | BODY MASS INDEX: 24.59 KG/M2 | DIASTOLIC BLOOD PRESSURE: 55 MMHG | HEIGHT: 69 IN | HEART RATE: 72 BPM | WEIGHT: 166 LBS

## 2018-01-01 VITALS
WEIGHT: 176 LBS | BODY MASS INDEX: 26.07 KG/M2 | HEART RATE: 80 BPM | DIASTOLIC BLOOD PRESSURE: 60 MMHG | SYSTOLIC BLOOD PRESSURE: 84 MMHG | HEIGHT: 69 IN

## 2018-01-01 VITALS
BODY MASS INDEX: 24.96 KG/M2 | WEIGHT: 169 LBS | DIASTOLIC BLOOD PRESSURE: 58 MMHG | RESPIRATION RATE: 12 BRPM | SYSTOLIC BLOOD PRESSURE: 90 MMHG | TEMPERATURE: 96.1 F | OXYGEN SATURATION: 95 % | HEART RATE: 75 BPM

## 2018-01-01 VITALS
SYSTOLIC BLOOD PRESSURE: 87 MMHG | TEMPERATURE: 97 F | DIASTOLIC BLOOD PRESSURE: 54 MMHG | WEIGHT: 172.84 LBS | HEIGHT: 69 IN | HEART RATE: 70 BPM | OXYGEN SATURATION: 96 % | BODY MASS INDEX: 25.6 KG/M2 | RESPIRATION RATE: 18 BRPM

## 2018-01-01 VITALS
HEIGHT: 70 IN | SYSTOLIC BLOOD PRESSURE: 110 MMHG | WEIGHT: 160.4 LBS | RESPIRATION RATE: 18 BRPM | DIASTOLIC BLOOD PRESSURE: 52 MMHG | BODY MASS INDEX: 22.96 KG/M2 | HEART RATE: 80 BPM

## 2018-01-01 VITALS
WEIGHT: 172 LBS | SYSTOLIC BLOOD PRESSURE: 98 MMHG | BODY MASS INDEX: 24.62 KG/M2 | RESPIRATION RATE: 20 BRPM | HEART RATE: 76 BPM | DIASTOLIC BLOOD PRESSURE: 62 MMHG | HEIGHT: 70 IN

## 2018-01-01 VITALS
BODY MASS INDEX: 23.7 KG/M2 | OXYGEN SATURATION: 98 % | HEIGHT: 69 IN | RESPIRATION RATE: 16 BRPM | DIASTOLIC BLOOD PRESSURE: 45 MMHG | WEIGHT: 160 LBS | SYSTOLIC BLOOD PRESSURE: 107 MMHG | HEART RATE: 70 BPM | TEMPERATURE: 98 F

## 2018-01-01 VITALS
TEMPERATURE: 97.9 F | HEART RATE: 84 BPM | BODY MASS INDEX: 23.7 KG/M2 | WEIGHT: 160 LBS | DIASTOLIC BLOOD PRESSURE: 91 MMHG | RESPIRATION RATE: 28 BRPM | OXYGEN SATURATION: 100 % | SYSTOLIC BLOOD PRESSURE: 125 MMHG | HEIGHT: 69 IN

## 2018-01-01 VITALS
SYSTOLIC BLOOD PRESSURE: 104 MMHG | RESPIRATION RATE: 20 BRPM | DIASTOLIC BLOOD PRESSURE: 70 MMHG | WEIGHT: 167 LBS | BODY MASS INDEX: 23.91 KG/M2 | HEART RATE: 72 BPM | HEIGHT: 70 IN

## 2018-01-01 VITALS
HEIGHT: 70 IN | WEIGHT: 153 LBS | HEART RATE: 71 BPM | TEMPERATURE: 97 F | OXYGEN SATURATION: 99 % | SYSTOLIC BLOOD PRESSURE: 97 MMHG | BODY MASS INDEX: 21.9 KG/M2 | RESPIRATION RATE: 16 BRPM | DIASTOLIC BLOOD PRESSURE: 62 MMHG

## 2018-01-01 VITALS
WEIGHT: 174.6 LBS | HEART RATE: 74 BPM | OXYGEN SATURATION: 98 % | SYSTOLIC BLOOD PRESSURE: 112 MMHG | BODY MASS INDEX: 25.41 KG/M2 | TEMPERATURE: 98.6 F | DIASTOLIC BLOOD PRESSURE: 74 MMHG

## 2018-01-01 DIAGNOSIS — M54.5 CHRONIC MIDLINE LOW BACK PAIN, WITH SCIATICA PRESENCE UNSPECIFIED: ICD-10-CM

## 2018-01-01 DIAGNOSIS — Z79.01 LONG TERM CURRENT USE OF ANTICOAGULANT THERAPY: ICD-10-CM

## 2018-01-01 DIAGNOSIS — E11.9 TYPE 2 DIABETES MELLITUS WITHOUT COMPLICATION, WITHOUT LONG-TERM CURRENT USE OF INSULIN (HCC): ICD-10-CM

## 2018-01-01 DIAGNOSIS — E11.29 TYPE 2 DIABETES MELLITUS WITH OTHER DIABETIC KIDNEY COMPLICATION, WITHOUT LONG-TERM CURRENT USE OF INSULIN (HCC): ICD-10-CM

## 2018-01-01 DIAGNOSIS — I10 ESSENTIAL HYPERTENSION: ICD-10-CM

## 2018-01-01 DIAGNOSIS — I48.91 ATRIAL FIBRILLATION, UNSPECIFIED TYPE (HCC): ICD-10-CM

## 2018-01-01 DIAGNOSIS — R53.1 GENERAL WEAKNESS: Primary | ICD-10-CM

## 2018-01-01 DIAGNOSIS — I25.10 CORONARY ARTERY DISEASE DUE TO LIPID RICH PLAQUE: ICD-10-CM

## 2018-01-01 DIAGNOSIS — I25.83 CORONARY ARTERY DISEASE DUE TO LIPID RICH PLAQUE: ICD-10-CM

## 2018-01-01 DIAGNOSIS — D50.9 IRON DEFICIENCY ANEMIA, UNSPECIFIED IRON DEFICIENCY ANEMIA TYPE: Primary | ICD-10-CM

## 2018-01-01 DIAGNOSIS — M48.061 SPINAL STENOSIS OF LUMBAR REGION, UNSPECIFIED WHETHER NEUROGENIC CLAUDICATION PRESENT: Primary | ICD-10-CM

## 2018-01-01 DIAGNOSIS — S01.01XA LACERATION OF SCALP, INITIAL ENCOUNTER: Primary | ICD-10-CM

## 2018-01-01 DIAGNOSIS — I50.9 CONGESTIVE HEART FAILURE, UNSPECIFIED CONGESTIVE HEART FAILURE CHRONICITY, UNSPECIFIED CONGESTIVE HEART FAILURE TYPE: Primary | ICD-10-CM

## 2018-01-01 DIAGNOSIS — I48.0 PAF (PAROXYSMAL ATRIAL FIBRILLATION) (HCC): ICD-10-CM

## 2018-01-01 DIAGNOSIS — D64.9 ANEMIA, UNSPECIFIED TYPE: ICD-10-CM

## 2018-01-01 DIAGNOSIS — E78.5 HYPERLIPIDEMIA, UNSPECIFIED HYPERLIPIDEMIA TYPE: ICD-10-CM

## 2018-01-01 DIAGNOSIS — I50.9 ACUTE ON CHRONIC CONGESTIVE HEART FAILURE, UNSPECIFIED CONGESTIVE HEART FAILURE TYPE: Primary | ICD-10-CM

## 2018-01-01 DIAGNOSIS — R13.12 OROPHARYNGEAL DYSPHAGIA: Primary | ICD-10-CM

## 2018-01-01 DIAGNOSIS — I50.9 CONGESTIVE HEART FAILURE, UNSPECIFIED CONGESTIVE HEART FAILURE CHRONICITY, UNSPECIFIED CONGESTIVE HEART FAILURE TYPE: ICD-10-CM

## 2018-01-01 DIAGNOSIS — R60.9 EDEMA, UNSPECIFIED TYPE: Primary | ICD-10-CM

## 2018-01-01 DIAGNOSIS — G47.33 OSA (OBSTRUCTIVE SLEEP APNEA): ICD-10-CM

## 2018-01-01 DIAGNOSIS — M25.551 ACUTE PAIN OF RIGHT HIP: ICD-10-CM

## 2018-01-01 DIAGNOSIS — I50.23 ACUTE ON CHRONIC SYSTOLIC CHF (CONGESTIVE HEART FAILURE) (HCC): ICD-10-CM

## 2018-01-01 DIAGNOSIS — I48.91 ATRIAL FIBRILLATION, UNSPECIFIED TYPE (HCC): Primary | ICD-10-CM

## 2018-01-01 DIAGNOSIS — I42.8 NON-ISCHEMIC CARDIOMYOPATHY (HCC): ICD-10-CM

## 2018-01-01 DIAGNOSIS — S09.90XA INJURY OF HEAD, INITIAL ENCOUNTER: Primary | ICD-10-CM

## 2018-01-01 DIAGNOSIS — I50.42 CHRONIC COMBINED SYSTOLIC AND DIASTOLIC CHF (CONGESTIVE HEART FAILURE) (HCC): Primary | ICD-10-CM

## 2018-01-01 DIAGNOSIS — R13.10 DYSPHAGIA, UNSPECIFIED TYPE: ICD-10-CM

## 2018-01-01 DIAGNOSIS — I95.9 HYPOTENSION, UNSPECIFIED HYPOTENSION TYPE: Primary | ICD-10-CM

## 2018-01-01 DIAGNOSIS — Z91.81 H/O FALLING: ICD-10-CM

## 2018-01-01 DIAGNOSIS — M48.061 SPINAL STENOSIS OF LUMBAR REGION, UNSPECIFIED WHETHER NEUROGENIC CLAUDICATION PRESENT: ICD-10-CM

## 2018-01-01 DIAGNOSIS — R29.6 FALLS FREQUENTLY: ICD-10-CM

## 2018-01-01 DIAGNOSIS — F41.9 ANXIETY: ICD-10-CM

## 2018-01-01 DIAGNOSIS — R60.9 PERIPHERAL EDEMA: Primary | ICD-10-CM

## 2018-01-01 DIAGNOSIS — R53.83 OTHER FATIGUE: ICD-10-CM

## 2018-01-01 DIAGNOSIS — I50.43 ACUTE ON CHRONIC COMBINED SYSTOLIC AND DIASTOLIC CHF (CONGESTIVE HEART FAILURE) (HCC): Primary | ICD-10-CM

## 2018-01-01 DIAGNOSIS — M54.6 ACUTE BILATERAL THORACIC BACK PAIN: Primary | ICD-10-CM

## 2018-01-01 DIAGNOSIS — I50.20 SYSTOLIC CONGESTIVE HEART FAILURE, UNSPECIFIED CONGESTIVE HEART FAILURE CHRONICITY: ICD-10-CM

## 2018-01-01 DIAGNOSIS — G47.33 OBSTRUCTIVE SLEEP APNEA SYNDROME: ICD-10-CM

## 2018-01-01 DIAGNOSIS — R53.1 GENERAL WEAKNESS: ICD-10-CM

## 2018-01-01 DIAGNOSIS — I42.9 CARDIOMYOPATHY, IDIOPATHIC (HCC): ICD-10-CM

## 2018-01-01 DIAGNOSIS — R26.9 GAIT ABNORMALITY: ICD-10-CM

## 2018-01-01 DIAGNOSIS — G89.29 CHRONIC MIDLINE LOW BACK PAIN, WITH SCIATICA PRESENCE UNSPECIFIED: ICD-10-CM

## 2018-01-01 DIAGNOSIS — S01.91XD TRAUMATIC HEAD INJURY WITH MULTIPLE LACERATIONS, SUBSEQUENT ENCOUNTER: ICD-10-CM

## 2018-01-01 DIAGNOSIS — R13.12 OROPHARYNGEAL DYSPHAGIA: ICD-10-CM

## 2018-01-01 DIAGNOSIS — R29.6 RECURRENT FALLS: ICD-10-CM

## 2018-01-01 DIAGNOSIS — R79.89 PRERENAL AZOTEMIA: ICD-10-CM

## 2018-01-01 DIAGNOSIS — M48.061 SPINAL STENOSIS, LUMBAR REGION, WITHOUT NEUROGENIC CLAUDICATION: ICD-10-CM

## 2018-01-01 DIAGNOSIS — E86.0 DEHYDRATION: Primary | ICD-10-CM

## 2018-01-01 DIAGNOSIS — N18.9 CHRONIC KIDNEY DISEASE, UNSPECIFIED CKD STAGE: ICD-10-CM

## 2018-01-01 DIAGNOSIS — B35.1 DERMATOPHYTOSIS OF NAIL: ICD-10-CM

## 2018-01-01 DIAGNOSIS — C61 PROSTATE CANCER (HCC): ICD-10-CM

## 2018-01-01 DIAGNOSIS — E11.9 TYPE 2 DIABETES MELLITUS WITHOUT COMPLICATION, WITHOUT LONG-TERM CURRENT USE OF INSULIN (HCC): Primary | ICD-10-CM

## 2018-01-01 DIAGNOSIS — N40.0 BENIGN PROSTATIC HYPERPLASIA WITHOUT LOWER URINARY TRACT SYMPTOMS: ICD-10-CM

## 2018-01-01 DIAGNOSIS — M54.10 RADICULOPATHY, UNSPECIFIED SPINAL REGION: ICD-10-CM

## 2018-01-01 DIAGNOSIS — S09.90XD TRAUMATIC HEAD INJURY WITH MULTIPLE LACERATIONS, SUBSEQUENT ENCOUNTER: ICD-10-CM

## 2018-01-01 DIAGNOSIS — I10 ESSENTIAL HYPERTENSION: Primary | ICD-10-CM

## 2018-01-01 DIAGNOSIS — S93.401A SPRAIN OF RIGHT ANKLE, UNSPECIFIED LIGAMENT, INITIAL ENCOUNTER: ICD-10-CM

## 2018-01-01 DIAGNOSIS — Z85.46 HISTORY OF PROSTATE CANCER: ICD-10-CM

## 2018-01-01 DIAGNOSIS — I50.42 CHRONIC COMBINED SYSTOLIC AND DIASTOLIC CHF (CONGESTIVE HEART FAILURE) (HCC): ICD-10-CM

## 2018-01-01 DIAGNOSIS — R41.0 CONFUSION: ICD-10-CM

## 2018-01-01 DIAGNOSIS — R33.9 URINARY RETENTION: ICD-10-CM

## 2018-01-01 DIAGNOSIS — I50.22 HEART FAILURE, SYSTOLIC, CHRONIC (HCC): ICD-10-CM

## 2018-01-01 DIAGNOSIS — K21.9 GASTROESOPHAGEAL REFLUX DISEASE WITHOUT ESOPHAGITIS: ICD-10-CM

## 2018-01-01 DIAGNOSIS — R60.9 EDEMA, PERIPHERAL: ICD-10-CM

## 2018-01-01 DIAGNOSIS — I48.0 PAROXYSMAL ATRIAL FIBRILLATION (HCC): ICD-10-CM

## 2018-01-01 DIAGNOSIS — I73.9 PVD (PERIPHERAL VASCULAR DISEASE) (HCC): ICD-10-CM

## 2018-01-01 DIAGNOSIS — I25.10 CORONARY ARTERY DISEASE DUE TO LIPID RICH PLAQUE: Primary | ICD-10-CM

## 2018-01-01 DIAGNOSIS — E11.49 DM (DIABETES MELLITUS), TYPE 2 WITH NEUROLOGICAL COMPLICATIONS (HCC): Primary | ICD-10-CM

## 2018-01-01 DIAGNOSIS — H61.92 SKIN LESION OF LEFT EAR: ICD-10-CM

## 2018-01-01 DIAGNOSIS — T17.900S SILENT ASPIRATION, SEQUELA: ICD-10-CM

## 2018-01-01 DIAGNOSIS — I71.40 ABDOMINAL AORTIC ANEURYSM WITHOUT RUPTURE: ICD-10-CM

## 2018-01-01 DIAGNOSIS — I25.83 CORONARY ARTERY DISEASE DUE TO LIPID RICH PLAQUE: Primary | ICD-10-CM

## 2018-01-01 LAB
-: ABNORMAL
-: NORMAL
ABSOLUTE EOS #: 0 K/UL (ref 0–0.4)
ABSOLUTE EOS #: 0.1 K/UL (ref 0–0.4)
ABSOLUTE EOS #: 0.13 K/UL (ref 0–0.4)
ABSOLUTE EOS #: 0.2 K/UL (ref 0–0.4)
ABSOLUTE IMMATURE GRANULOCYTE: ABNORMAL K/UL (ref 0–0.3)
ABSOLUTE LYMPH #: 0.2 K/UL (ref 1–4.8)
ABSOLUTE LYMPH #: 0.4 K/UL (ref 1–4.8)
ABSOLUTE LYMPH #: 0.4 K/UL (ref 1–4.8)
ABSOLUTE LYMPH #: 0.47 K/UL (ref 1–4.8)
ABSOLUTE LYMPH #: 0.5 K/UL (ref 1–4.8)
ABSOLUTE LYMPH #: 0.6 K/UL (ref 1–4.8)
ABSOLUTE LYMPH #: 0.7 K/UL (ref 1–4.8)
ABSOLUTE MONO #: 0.5 K/UL (ref 0.1–1.2)
ABSOLUTE MONO #: 0.6 K/UL (ref 0.1–1.2)
ABSOLUTE MONO #: 0.7 K/UL (ref 0.1–1.2)
ABSOLUTE MONO #: 0.74 K/UL (ref 0.1–1.2)
ABSOLUTE MONO #: 0.8 K/UL (ref 0.1–1.2)
ABSOLUTE MONO #: 0.8 K/UL (ref 0.1–1.2)
ABSOLUTE MONO #: 0.9 K/UL (ref 0.1–1.2)
ABSOLUTE MONO #: 1 K/UL (ref 0.1–1.2)
ALBUMIN SERPL-MCNC: 3.4 G/DL (ref 3.5–5.2)
ALBUMIN SERPL-MCNC: 3.6 G/DL (ref 3.5–5.2)
ALBUMIN/GLOBULIN RATIO: 0.9 (ref 1–2.5)
ALBUMIN/GLOBULIN RATIO: 1.1 (ref 1–2.5)
ALP BLD-CCNC: 129 U/L (ref 40–129)
ALP BLD-CCNC: 136 U/L (ref 40–129)
ALT SERPL-CCNC: 24 U/L (ref 5–41)
ALT SERPL-CCNC: 26 U/L (ref 5–41)
AMORPHOUS: ABNORMAL
AMORPHOUS: NORMAL
ANION GAP SERPL CALCULATED.3IONS-SCNC: 12 MMOL/L (ref 9–17)
ANION GAP SERPL CALCULATED.3IONS-SCNC: 13 MMOL/L (ref 9–17)
ANION GAP SERPL CALCULATED.3IONS-SCNC: 14 MMOL/L (ref 9–17)
ANION GAP SERPL CALCULATED.3IONS-SCNC: 14 MMOL/L (ref 9–17)
ANION GAP SERPL CALCULATED.3IONS-SCNC: 15 MMOL/L (ref 9–17)
ANION GAP SERPL CALCULATED.3IONS-SCNC: 16 MMOL/L (ref 9–17)
ANION GAP SERPL CALCULATED.3IONS-SCNC: 17 MMOL/L (ref 9–17)
ANION GAP SERPL CALCULATED.3IONS-SCNC: 18 MMOL/L (ref 9–17)
ANION GAP SERPL CALCULATED.3IONS-SCNC: 18 MMOL/L (ref 9–17)
ANION GAP SERPL CALCULATED.3IONS-SCNC: 19 MMOL/L (ref 9–17)
ANION GAP SERPL CALCULATED.3IONS-SCNC: 20 MMOL/L (ref 9–17)
AST SERPL-CCNC: 32 U/L
AST SERPL-CCNC: 43 U/L
BACTERIA: ABNORMAL
BACTERIA: NORMAL
BASOPHILS # BLD: 0 % (ref 0–2)
BASOPHILS # BLD: 1 % (ref 0–2)
BASOPHILS ABSOLUTE: 0 K/UL (ref 0–0.2)
BASOPHILS ABSOLUTE: 0.07 K/UL (ref 0–0.2)
BASOPHILS ABSOLUTE: 0.1 K/UL (ref 0–0.2)
BILIRUB SERPL-MCNC: 1.13 MG/DL (ref 0.3–1.2)
BILIRUB SERPL-MCNC: 1.82 MG/DL (ref 0.3–1.2)
BILIRUBIN DIRECT: 0.84 MG/DL
BILIRUBIN URINE: NEGATIVE
BILIRUBIN, INDIRECT: 0.98 MG/DL (ref 0–1)
BNP INTERPRETATION: ABNORMAL
BUN BLDV-MCNC: 32 MG/DL (ref 8–23)
BUN BLDV-MCNC: 34 MG/DL (ref 8–23)
BUN BLDV-MCNC: 34 MG/DL (ref 8–23)
BUN BLDV-MCNC: 37 MG/DL (ref 8–23)
BUN BLDV-MCNC: 41 MG/DL (ref 8–23)
BUN BLDV-MCNC: 44 MG/DL (ref 8–23)
BUN BLDV-MCNC: 44 MG/DL (ref 8–23)
BUN BLDV-MCNC: 46 MG/DL (ref 8–23)
BUN BLDV-MCNC: 49 MG/DL (ref 8–23)
BUN BLDV-MCNC: 52 MG/DL (ref 8–23)
BUN BLDV-MCNC: 53 MG/DL (ref 8–23)
BUN BLDV-MCNC: 54 MG/DL (ref 8–23)
BUN BLDV-MCNC: 55 MG/DL (ref 8–23)
BUN BLDV-MCNC: 56 MG/DL (ref 8–23)
BUN BLDV-MCNC: 59 MG/DL (ref 8–23)
BUN BLDV-MCNC: 64 MG/DL (ref 8–23)
BUN BLDV-MCNC: 65 MG/DL (ref 8–23)
BUN BLDV-MCNC: 66 MG/DL (ref 8–23)
BUN BLDV-MCNC: 66 MG/DL (ref 8–23)
BUN BLDV-MCNC: 67 MG/DL (ref 8–23)
BUN BLDV-MCNC: 68 MG/DL (ref 8–23)
BUN BLDV-MCNC: 69 MG/DL (ref 8–23)
BUN BLDV-MCNC: 70 MG/DL (ref 8–23)
BUN BLDV-MCNC: 76 MG/DL (ref 8–23)
BUN BLDV-MCNC: 77 MG/DL (ref 8–23)
BUN/CREAT BLD: 24 (ref 9–20)
BUN/CREAT BLD: 25 (ref 9–20)
BUN/CREAT BLD: 28 (ref 9–20)
BUN/CREAT BLD: 29 (ref 9–20)
BUN/CREAT BLD: 29 (ref 9–20)
BUN/CREAT BLD: 30 (ref 9–20)
BUN/CREAT BLD: 31 (ref 9–20)
BUN/CREAT BLD: 31 (ref 9–20)
BUN/CREAT BLD: 34 (ref 9–20)
BUN/CREAT BLD: 37 (ref 9–20)
BUN/CREAT BLD: 38 (ref 9–20)
BUN/CREAT BLD: 40 (ref 9–20)
BUN/CREAT BLD: 41 (ref 9–20)
BUN/CREAT BLD: 43 (ref 9–20)
BUN/CREAT BLD: 44 (ref 9–20)
BUN/CREAT BLD: 45 (ref 9–20)
BUN/CREAT BLD: 48 (ref 9–20)
CALCIUM SERPL-MCNC: 8.7 MG/DL (ref 8.6–10.4)
CALCIUM SERPL-MCNC: 8.7 MG/DL (ref 8.6–10.4)
CALCIUM SERPL-MCNC: 8.8 MG/DL (ref 8.6–10.4)
CALCIUM SERPL-MCNC: 8.9 MG/DL (ref 8.6–10.4)
CALCIUM SERPL-MCNC: 8.9 MG/DL (ref 8.6–10.4)
CALCIUM SERPL-MCNC: 9.1 MG/DL (ref 8.6–10.4)
CALCIUM SERPL-MCNC: 9.2 MG/DL (ref 8.6–10.4)
CALCIUM SERPL-MCNC: 9.3 MG/DL (ref 8.6–10.4)
CALCIUM SERPL-MCNC: 9.4 MG/DL (ref 8.6–10.4)
CALCIUM SERPL-MCNC: 9.5 MG/DL (ref 8.6–10.4)
CALCIUM SERPL-MCNC: 9.6 MG/DL (ref 8.6–10.4)
CASTS UA: >50 /LPF (ref 0–2)
CASTS UA: ABNORMAL /LPF (ref 0–2)
CASTS UA: ABNORMAL /LPF (ref 0–2)
CASTS UA: NORMAL /LPF (ref 0–2)
CHLORIDE BLD-SCNC: 100 MMOL/L (ref 98–107)
CHLORIDE BLD-SCNC: 100 MMOL/L (ref 98–107)
CHLORIDE BLD-SCNC: 101 MMOL/L (ref 98–107)
CHLORIDE BLD-SCNC: 101 MMOL/L (ref 98–107)
CHLORIDE BLD-SCNC: 102 MMOL/L (ref 98–107)
CHLORIDE BLD-SCNC: 94 MMOL/L (ref 98–107)
CHLORIDE BLD-SCNC: 95 MMOL/L (ref 98–107)
CHLORIDE BLD-SCNC: 95 MMOL/L (ref 98–107)
CHLORIDE BLD-SCNC: 96 MMOL/L (ref 98–107)
CHLORIDE BLD-SCNC: 97 MMOL/L (ref 98–107)
CHLORIDE BLD-SCNC: 98 MMOL/L (ref 98–107)
CHLORIDE BLD-SCNC: 98 MMOL/L (ref 98–107)
CHLORIDE BLD-SCNC: 99 MMOL/L (ref 98–107)
CHOLESTEROL/HDL RATIO: 1.8
CHOLESTEROL/HDL RATIO: 1.9
CHOLESTEROL/HDL RATIO: 2.2
CHOLESTEROL: 61 MG/DL
CHOLESTEROL: 67 MG/DL
CHOLESTEROL: 81 MG/DL
CO2: 18 MMOL/L (ref 20–31)
CO2: 18 MMOL/L (ref 20–31)
CO2: 19 MMOL/L (ref 20–31)
CO2: 19 MMOL/L (ref 20–31)
CO2: 20 MMOL/L (ref 20–31)
CO2: 21 MMOL/L (ref 20–31)
CO2: 22 MMOL/L (ref 20–31)
CO2: 23 MMOL/L (ref 20–31)
CO2: 24 MMOL/L (ref 20–31)
CO2: 24 MMOL/L (ref 20–31)
CO2: 26 MMOL/L (ref 20–31)
CO2: 27 MMOL/L (ref 20–31)
COLOR: ABNORMAL
COLOR: NORMAL
COMMENT UA: ABNORMAL
COMMENT UA: NORMAL
CREAT SERPL-MCNC: 1.17 MG/DL (ref 0.7–1.2)
CREAT SERPL-MCNC: 1.21 MG/DL (ref 0.7–1.2)
CREAT SERPL-MCNC: 1.23 MG/DL (ref 0.7–1.2)
CREAT SERPL-MCNC: 1.23 MG/DL (ref 0.7–1.2)
CREAT SERPL-MCNC: 1.26 MG/DL (ref 0.7–1.2)
CREAT SERPL-MCNC: 1.27 MG/DL (ref 0.7–1.2)
CREAT SERPL-MCNC: 1.29 MG/DL (ref 0.7–1.2)
CREAT SERPL-MCNC: 1.31 MG/DL (ref 0.7–1.2)
CREAT SERPL-MCNC: 1.31 MG/DL (ref 0.7–1.2)
CREAT SERPL-MCNC: 1.34 MG/DL (ref 0.7–1.2)
CREAT SERPL-MCNC: 1.39 MG/DL (ref 0.7–1.2)
CREAT SERPL-MCNC: 1.4 MG/DL (ref 0.7–1.2)
CREAT SERPL-MCNC: 1.42 MG/DL (ref 0.7–1.2)
CREAT SERPL-MCNC: 1.47 MG/DL (ref 0.7–1.2)
CREAT SERPL-MCNC: 1.5 MG/DL (ref 0.7–1.2)
CREAT SERPL-MCNC: 1.54 MG/DL (ref 0.7–1.2)
CREAT SERPL-MCNC: 1.58 MG/DL (ref 0.7–1.2)
CREAT SERPL-MCNC: 1.58 MG/DL (ref 0.7–1.2)
CREAT SERPL-MCNC: 1.6 MG/DL (ref 0.7–1.2)
CREAT SERPL-MCNC: 1.6 MG/DL (ref 0.7–1.2)
CREAT SERPL-MCNC: 1.65 MG/DL (ref 0.7–1.2)
CREAT SERPL-MCNC: 1.69 MG/DL (ref 0.7–1.2)
CREAT SERPL-MCNC: 1.74 MG/DL (ref 0.7–1.2)
CREAT SERPL-MCNC: 1.84 MG/DL (ref 0.7–1.2)
CREAT SERPL-MCNC: 1.87 MG/DL (ref 0.7–1.2)
CRYSTALS, UA: ABNORMAL /HPF
CRYSTALS, UA: NORMAL /HPF
CULTURE: ABNORMAL
CULTURE: ABNORMAL
CULTURE: NORMAL
DIFFERENTIAL TYPE: ABNORMAL
EKG ATRIAL RATE: 227 BPM
EKG ATRIAL RATE: 63 BPM
EKG ATRIAL RATE: 65 BPM
EKG ATRIAL RATE: 66 BPM
EKG ATRIAL RATE: 67 BPM
EKG ATRIAL RATE: 70 BPM
EKG ATRIAL RATE: 77 BPM
EKG ATRIAL RATE: 78 BPM
EKG Q-T INTERVAL: 480 MS
EKG Q-T INTERVAL: 488 MS
EKG Q-T INTERVAL: 490 MS
EKG Q-T INTERVAL: 494 MS
EKG Q-T INTERVAL: 500 MS
EKG Q-T INTERVAL: 502 MS
EKG Q-T INTERVAL: 504 MS
EKG Q-T INTERVAL: 514 MS
EKG QRS DURATION: 184 MS
EKG QRS DURATION: 186 MS
EKG QRS DURATION: 188 MS
EKG QRS DURATION: 188 MS
EKG QRS DURATION: 192 MS
EKG QRS DURATION: 194 MS
EKG QRS DURATION: 194 MS
EKG QRS DURATION: 196 MS
EKG QTC CALCULATION (BAZETT): 525 MS
EKG QTC CALCULATION (BAZETT): 529 MS
EKG QTC CALCULATION (BAZETT): 530 MS
EKG QTC CALCULATION (BAZETT): 533 MS
EKG QTC CALCULATION (BAZETT): 540 MS
EKG QTC CALCULATION (BAZETT): 542 MS
EKG QTC CALCULATION (BAZETT): 559 MS
EKG QTC CALCULATION (BAZETT): 562 MS
EKG R AXIS: 133 DEGREES
EKG R AXIS: 138 DEGREES
EKG R AXIS: 139 DEGREES
EKG R AXIS: 140 DEGREES
EKG R AXIS: 140 DEGREES
EKG R AXIS: 142 DEGREES
EKG R AXIS: 145 DEGREES
EKG R AXIS: 149 DEGREES
EKG T AXIS: 103 DEGREES
EKG T AXIS: 104 DEGREES
EKG T AXIS: 105 DEGREES
EKG T AXIS: 96 DEGREES
EKG T AXIS: 97 DEGREES
EKG T AXIS: 98 DEGREES
EKG T AXIS: 98 DEGREES
EKG T AXIS: 99 DEGREES
EKG VENTRICULAR RATE: 70 BPM
EKG VENTRICULAR RATE: 71 BPM
EKG VENTRICULAR RATE: 72 BPM
EKG VENTRICULAR RATE: 72 BPM
EKG VENTRICULAR RATE: 74 BPM
EOSINOPHILS RELATIVE PERCENT: 0 % (ref 1–8)
EOSINOPHILS RELATIVE PERCENT: 1 % (ref 1–8)
EOSINOPHILS RELATIVE PERCENT: 2 % (ref 1–8)
EOSINOPHILS RELATIVE PERCENT: 3 % (ref 1–8)
EPITHELIAL CELLS UA: ABNORMAL /HPF (ref 0–5)
EPITHELIAL CELLS UA: NORMAL /HPF (ref 0–5)
ESTIMATED AVERAGE GLUCOSE: 146 MG/DL
ESTIMATED AVERAGE GLUCOSE: 148 MG/DL
ESTIMATED AVERAGE GLUCOSE: 157 MG/DL
GFR AFRICAN AMERICAN: 42 ML/MIN
GFR AFRICAN AMERICAN: 42 ML/MIN
GFR AFRICAN AMERICAN: 45 ML/MIN
GFR AFRICAN AMERICAN: 47 ML/MIN
GFR AFRICAN AMERICAN: 48 ML/MIN
GFR AFRICAN AMERICAN: 50 ML/MIN
GFR AFRICAN AMERICAN: 52 ML/MIN
GFR AFRICAN AMERICAN: 54 ML/MIN
GFR AFRICAN AMERICAN: 55 ML/MIN
GFR AFRICAN AMERICAN: 57 ML/MIN
GFR AFRICAN AMERICAN: 58 ML/MIN
GFR AFRICAN AMERICAN: 58 ML/MIN
GFR AFRICAN AMERICAN: >60 ML/MIN
GFR NON-AFRICAN AMERICAN: 34 ML/MIN
GFR NON-AFRICAN AMERICAN: 35 ML/MIN
GFR NON-AFRICAN AMERICAN: 37 ML/MIN
GFR NON-AFRICAN AMERICAN: 38 ML/MIN
GFR NON-AFRICAN AMERICAN: 40 ML/MIN
GFR NON-AFRICAN AMERICAN: 41 ML/MIN
GFR NON-AFRICAN AMERICAN: 41 ML/MIN
GFR NON-AFRICAN AMERICAN: 42 ML/MIN
GFR NON-AFRICAN AMERICAN: 42 ML/MIN
GFR NON-AFRICAN AMERICAN: 43 ML/MIN
GFR NON-AFRICAN AMERICAN: 44 ML/MIN
GFR NON-AFRICAN AMERICAN: 45 ML/MIN
GFR NON-AFRICAN AMERICAN: 47 ML/MIN
GFR NON-AFRICAN AMERICAN: 48 ML/MIN
GFR NON-AFRICAN AMERICAN: 48 ML/MIN
GFR NON-AFRICAN AMERICAN: 50 ML/MIN
GFR NON-AFRICAN AMERICAN: 52 ML/MIN
GFR NON-AFRICAN AMERICAN: 52 ML/MIN
GFR NON-AFRICAN AMERICAN: 53 ML/MIN
GFR NON-AFRICAN AMERICAN: 54 ML/MIN
GFR NON-AFRICAN AMERICAN: 54 ML/MIN
GFR NON-AFRICAN AMERICAN: 56 ML/MIN
GFR NON-AFRICAN AMERICAN: 56 ML/MIN
GFR NON-AFRICAN AMERICAN: 57 ML/MIN
GFR NON-AFRICAN AMERICAN: 59 ML/MIN
GFR SERPL CREATININE-BSD FRML MDRD: ABNORMAL ML/MIN/{1.73_M2}
GLOBULIN: 3.4 G/DL (ref 1.5–3.8)
GLUCOSE BLD-MCNC: 100 MG/DL (ref 70–99)
GLUCOSE BLD-MCNC: 102 MG/DL (ref 70–99)
GLUCOSE BLD-MCNC: 106 MG/DL (ref 70–99)
GLUCOSE BLD-MCNC: 112 MG/DL (ref 70–99)
GLUCOSE BLD-MCNC: 113 MG/DL (ref 70–99)
GLUCOSE BLD-MCNC: 113 MG/DL (ref 75–110)
GLUCOSE BLD-MCNC: 114 MG/DL (ref 70–99)
GLUCOSE BLD-MCNC: 116 MG/DL (ref 70–99)
GLUCOSE BLD-MCNC: 116 MG/DL (ref 75–110)
GLUCOSE BLD-MCNC: 119 MG/DL (ref 70–99)
GLUCOSE BLD-MCNC: 121 MG/DL (ref 70–99)
GLUCOSE BLD-MCNC: 122 MG/DL (ref 70–99)
GLUCOSE BLD-MCNC: 124 MG/DL (ref 70–99)
GLUCOSE BLD-MCNC: 124 MG/DL (ref 70–99)
GLUCOSE BLD-MCNC: 125 MG/DL (ref 70–99)
GLUCOSE BLD-MCNC: 127 MG/DL (ref 70–99)
GLUCOSE BLD-MCNC: 127 MG/DL (ref 70–99)
GLUCOSE BLD-MCNC: 127 MG/DL (ref 75–110)
GLUCOSE BLD-MCNC: 128 MG/DL (ref 70–99)
GLUCOSE BLD-MCNC: 131 MG/DL (ref 75–110)
GLUCOSE BLD-MCNC: 131 MG/DL (ref 75–110)
GLUCOSE BLD-MCNC: 132 MG/DL (ref 75–110)
GLUCOSE BLD-MCNC: 133 MG/DL (ref 70–99)
GLUCOSE BLD-MCNC: 135 MG/DL (ref 70–99)
GLUCOSE BLD-MCNC: 139 MG/DL (ref 70–99)
GLUCOSE BLD-MCNC: 140 MG/DL (ref 70–99)
GLUCOSE BLD-MCNC: 142 MG/DL (ref 70–99)
GLUCOSE BLD-MCNC: 143 MG/DL (ref 70–99)
GLUCOSE BLD-MCNC: 151 MG/DL (ref 75–110)
GLUCOSE BLD-MCNC: 153 MG/DL (ref 75–110)
GLUCOSE BLD-MCNC: 172 MG/DL (ref 70–99)
GLUCOSE BLD-MCNC: 174 MG/DL (ref 75–110)
GLUCOSE URINE: NEGATIVE
HBA1C MFR BLD: 6.7 % (ref 4.8–5.9)
HBA1C MFR BLD: 6.8 % (ref 4.8–5.9)
HBA1C MFR BLD: 7.1 % (ref 4.8–5.9)
HCT VFR BLD CALC: 33.9 % (ref 41–53)
HCT VFR BLD CALC: 34.4 % (ref 41–53)
HCT VFR BLD CALC: 34.7 % (ref 41–53)
HCT VFR BLD CALC: 34.8 % (ref 41–53)
HCT VFR BLD CALC: 35.4 % (ref 41–53)
HCT VFR BLD CALC: 35.5 % (ref 41–53)
HCT VFR BLD CALC: 35.7 % (ref 41–53)
HCT VFR BLD CALC: 35.7 % (ref 41–53)
HCT VFR BLD CALC: 35.8 % (ref 41–53)
HCT VFR BLD CALC: 35.9 % (ref 41–53)
HCT VFR BLD CALC: 36 % (ref 41–53)
HCT VFR BLD CALC: 36.1 % (ref 41–53)
HCT VFR BLD CALC: 36.2 % (ref 41–53)
HCT VFR BLD CALC: 36.4 % (ref 41–53)
HCT VFR BLD CALC: 36.7 % (ref 41–53)
HCT VFR BLD CALC: 37.2 % (ref 41–53)
HCT VFR BLD CALC: 37.3 % (ref 41–53)
HCT VFR BLD CALC: 37.3 % (ref 41–53)
HCT VFR BLD CALC: 37.4 % (ref 41–53)
HCT VFR BLD CALC: 37.9 % (ref 41–53)
HCT VFR BLD CALC: 38.1 % (ref 41–53)
HCT VFR BLD CALC: 38.5 % (ref 41–53)
HCT VFR BLD CALC: 39.3 % (ref 41–53)
HCT VFR BLD CALC: 39.7 % (ref 41–53)
HDLC SERPL-MCNC: 32 MG/DL
HDLC SERPL-MCNC: 37 MG/DL
HDLC SERPL-MCNC: 37 MG/DL
HEMOGLOBIN: 11.4 G/DL (ref 13.5–17.5)
HEMOGLOBIN: 11.4 G/DL (ref 13.5–17.5)
HEMOGLOBIN: 11.5 G/DL (ref 13.5–17.5)
HEMOGLOBIN: 11.8 G/DL (ref 13.5–17.5)
HEMOGLOBIN: 11.9 G/DL (ref 13.5–17.5)
HEMOGLOBIN: 12 G/DL (ref 13.5–17.5)
HEMOGLOBIN: 12.1 G/DL (ref 13.5–17.5)
HEMOGLOBIN: 12.1 G/DL (ref 13.5–17.5)
HEMOGLOBIN: 12.2 G/DL (ref 13.5–17.5)
HEMOGLOBIN: 12.3 G/DL (ref 13.5–17.5)
HEMOGLOBIN: 12.3 G/DL (ref 13.5–17.5)
HEMOGLOBIN: 12.4 G/DL (ref 13.5–17.5)
HEMOGLOBIN: 12.5 G/DL (ref 13.5–17.5)
HEMOGLOBIN: 12.6 G/DL (ref 13.5–17.5)
HEMOGLOBIN: 12.6 G/DL (ref 13.5–17.5)
HEMOGLOBIN: 12.7 G/DL (ref 13.5–17.5)
HEMOGLOBIN: 12.8 G/DL (ref 13.5–17.5)
HEMOGLOBIN: 13.1 G/DL (ref 13.5–17.5)
HEMOGLOBIN: 13.2 G/DL (ref 13.5–17.5)
IMMATURE GRANULOCYTES: ABNORMAL %
INR BLD: 1.7
INR BLD: 1.7
INR BLD: 1.8
INR BLD: 1.8
INR BLD: 2
INR BLD: 2.1
INR BLD: 2.3
INR BLD: 2.4
INR BLD: 2.5
INR BLD: 2.6
INR BLD: 2.6
INR BLD: 2.7
INR BLD: 2.7
INR BLD: 2.8
INR BLD: 2.9
INR BLD: 3
INR BLD: 3.1
INR BLD: 3.2
INR BLD: 3.3
INR BLD: 3.4
INR BLD: 3.5
INR BLD: 3.6
INR BLD: 3.7
INR BLD: 3.8
INR BLD: 3.9
INR BLD: 5
INR BLD: 5.5
IRON SATURATION: 17 % (ref 20–55)
IRON: 53 UG/DL (ref 59–158)
KETONES, URINE: NEGATIVE
LACTIC ACID: 1.3 MMOL/L (ref 0.5–2.2)
LACTIC ACID: 1.8 MMOL/L (ref 0.5–2.2)
LDL CHOLESTEROL: 19 MG/DL (ref 0–130)
LDL CHOLESTEROL: 20 MG/DL (ref 0–130)
LDL CHOLESTEROL: 34 MG/DL (ref 0–130)
LEUKOCYTE ESTERASE, URINE: ABNORMAL
LEUKOCYTE ESTERASE, URINE: NEGATIVE
LYMPHOCYTES # BLD: 10 % (ref 15–43)
LYMPHOCYTES # BLD: 2 % (ref 15–43)
LYMPHOCYTES # BLD: 5 % (ref 15–43)
LYMPHOCYTES # BLD: 6 % (ref 15–43)
LYMPHOCYTES # BLD: 6 % (ref 15–43)
LYMPHOCYTES # BLD: 7 % (ref 15–43)
LYMPHOCYTES # BLD: 7 % (ref 15–43)
LYMPHOCYTES # BLD: 8 % (ref 15–43)
LYMPHOCYTES # BLD: 9 % (ref 15–43)
Lab: ABNORMAL
Lab: NORMAL
MAGNESIUM: 2 MG/DL (ref 1.6–2.6)
MAGNESIUM: 2.2 MG/DL (ref 1.6–2.6)
MAGNESIUM: 2.4 MG/DL (ref 1.6–2.6)
MCH RBC QN AUTO: 34.2 PG (ref 26–34)
MCH RBC QN AUTO: 34.4 PG (ref 26–34)
MCH RBC QN AUTO: 34.5 PG (ref 26–34)
MCH RBC QN AUTO: 34.5 PG (ref 26–34)
MCH RBC QN AUTO: 34.6 PG (ref 26–34)
MCH RBC QN AUTO: 34.6 PG (ref 26–34)
MCH RBC QN AUTO: 34.7 PG (ref 26–34)
MCH RBC QN AUTO: 34.8 PG (ref 26–34)
MCH RBC QN AUTO: 34.9 PG (ref 26–34)
MCH RBC QN AUTO: 35 PG (ref 26–34)
MCH RBC QN AUTO: 35.2 PG (ref 26–34)
MCH RBC QN AUTO: 35.4 PG (ref 26–34)
MCH RBC QN AUTO: 35.4 PG (ref 26–34)
MCH RBC QN AUTO: 35.6 PG (ref 26–34)
MCH RBC QN AUTO: 35.8 PG (ref 26–34)
MCH RBC QN AUTO: 35.8 PG (ref 26–34)
MCH RBC QN AUTO: 35.9 PG (ref 26–34)
MCH RBC QN AUTO: 36.2 PG (ref 26–34)
MCH RBC QN AUTO: 36.7 PG (ref 26–34)
MCH RBC QN AUTO: 36.9 PG (ref 26–34)
MCH RBC QN AUTO: 37.6 PG (ref 26–34)
MCHC RBC AUTO-ENTMCNC: 32.3 G/DL (ref 31–37)
MCHC RBC AUTO-ENTMCNC: 32.4 G/DL (ref 31–37)
MCHC RBC AUTO-ENTMCNC: 32.6 G/DL (ref 31–37)
MCHC RBC AUTO-ENTMCNC: 32.7 G/DL (ref 31–37)
MCHC RBC AUTO-ENTMCNC: 33 G/DL (ref 31–37)
MCHC RBC AUTO-ENTMCNC: 33.1 G/DL (ref 31–37)
MCHC RBC AUTO-ENTMCNC: 33.1 G/DL (ref 31–37)
MCHC RBC AUTO-ENTMCNC: 33.2 G/DL (ref 31–37)
MCHC RBC AUTO-ENTMCNC: 33.4 G/DL (ref 31–37)
MCHC RBC AUTO-ENTMCNC: 33.4 G/DL (ref 31–37)
MCHC RBC AUTO-ENTMCNC: 33.5 G/DL (ref 31–37)
MCHC RBC AUTO-ENTMCNC: 33.6 G/DL (ref 31–37)
MCHC RBC AUTO-ENTMCNC: 33.6 G/DL (ref 31–37)
MCHC RBC AUTO-ENTMCNC: 33.7 G/DL (ref 31–37)
MCHC RBC AUTO-ENTMCNC: 33.8 G/DL (ref 31–37)
MCV RBC AUTO: 102.1 FL (ref 80–100)
MCV RBC AUTO: 103 FL (ref 80–100)
MCV RBC AUTO: 103.1 FL (ref 80–100)
MCV RBC AUTO: 103.3 FL (ref 80–100)
MCV RBC AUTO: 103.6 FL (ref 80–100)
MCV RBC AUTO: 103.8 FL (ref 80–100)
MCV RBC AUTO: 104.3 FL (ref 80–100)
MCV RBC AUTO: 104.7 FL (ref 80–100)
MCV RBC AUTO: 104.8 FL (ref 80–100)
MCV RBC AUTO: 105.1 FL (ref 80–100)
MCV RBC AUTO: 105.2 FL (ref 80–100)
MCV RBC AUTO: 105.2 FL (ref 80–100)
MCV RBC AUTO: 107.1 FL (ref 80–100)
MCV RBC AUTO: 107.2 FL (ref 80–100)
MCV RBC AUTO: 107.4 FL (ref 80–100)
MCV RBC AUTO: 107.7 FL (ref 80–100)
MCV RBC AUTO: 108.1 FL (ref 80–100)
MCV RBC AUTO: 108.1 FL (ref 80–100)
MCV RBC AUTO: 110.2 FL (ref 80–100)
MCV RBC AUTO: 110.9 FL (ref 80–100)
MCV RBC AUTO: 111 FL (ref 80–100)
MCV RBC AUTO: 111.5 FL (ref 80–100)
MONOCYTES # BLD: 10 % (ref 6–14)
MONOCYTES # BLD: 11 % (ref 6–14)
MONOCYTES # BLD: 12 % (ref 6–14)
MONOCYTES # BLD: 12 % (ref 6–14)
MONOCYTES # BLD: 13 % (ref 6–14)
MONOCYTES # BLD: 13 % (ref 6–14)
MONOCYTES # BLD: 15 % (ref 6–14)
MONOCYTES # BLD: 15 % (ref 6–14)
MONOCYTES # BLD: 8 % (ref 6–14)
MONOCYTES # BLD: 9 % (ref 6–14)
MONOCYTES # BLD: 9 % (ref 6–14)
MORPHOLOGY: ABNORMAL
MUCUS: ABNORMAL
MUCUS: NORMAL
MYOGLOBIN: 198 NG/ML (ref 28–72)
NITRITE, URINE: NEGATIVE
NRBC AUTOMATED: ABNORMAL PER 100 WBC
ORGANISM: ABNORMAL
OTHER OBSERVATIONS UA: ABNORMAL
OTHER OBSERVATIONS UA: NORMAL
PDW BLD-RTO: 15.3 % (ref 11–14.5)
PDW BLD-RTO: 15.5 % (ref 11–14.5)
PDW BLD-RTO: 15.5 % (ref 11–14.5)
PDW BLD-RTO: 16.1 % (ref 11–14.5)
PDW BLD-RTO: 16.3 % (ref 11–14.5)
PDW BLD-RTO: 16.4 % (ref 11–14.5)
PDW BLD-RTO: 16.5 % (ref 11–14.5)
PDW BLD-RTO: 16.6 % (ref 11–14.5)
PDW BLD-RTO: 16.7 % (ref 11–14.5)
PDW BLD-RTO: 17 % (ref 11–14.5)
PDW BLD-RTO: 17.1 % (ref 11–14.5)
PDW BLD-RTO: 17.1 % (ref 11–14.5)
PDW BLD-RTO: 17.3 % (ref 11–14.5)
PDW BLD-RTO: 17.3 % (ref 11–14.5)
PDW BLD-RTO: 17.5 % (ref 11–14.5)
PDW BLD-RTO: 17.5 % (ref 11–14.5)
PDW BLD-RTO: 17.6 % (ref 11–14.5)
PDW BLD-RTO: 17.6 % (ref 11–14.5)
PDW BLD-RTO: 18 % (ref 11–14.5)
PDW BLD-RTO: 18.1 % (ref 11–14.5)
PH UA: 5 (ref 5–6)
PH UA: 5.5 (ref 5–6)
PLATELET # BLD: 127 K/UL (ref 140–450)
PLATELET # BLD: 139 K/UL (ref 140–450)
PLATELET # BLD: 143 K/UL (ref 140–450)
PLATELET # BLD: 144 K/UL (ref 140–450)
PLATELET # BLD: 148 K/UL (ref 140–450)
PLATELET # BLD: 150 K/UL (ref 140–450)
PLATELET # BLD: 151 K/UL (ref 140–450)
PLATELET # BLD: 152 K/UL (ref 140–450)
PLATELET # BLD: 153 K/UL (ref 140–450)
PLATELET # BLD: 155 K/UL (ref 140–450)
PLATELET # BLD: 160 K/UL (ref 140–450)
PLATELET # BLD: 160 K/UL (ref 140–450)
PLATELET # BLD: 163 K/UL (ref 140–450)
PLATELET # BLD: 166 K/UL (ref 140–450)
PLATELET # BLD: 171 K/UL (ref 140–450)
PLATELET # BLD: 172 K/UL (ref 140–450)
PLATELET # BLD: 174 K/UL (ref 140–450)
PLATELET # BLD: 174 K/UL (ref 140–450)
PLATELET # BLD: 175 K/UL (ref 140–450)
PLATELET # BLD: 176 K/UL (ref 140–450)
PLATELET # BLD: 178 K/UL (ref 140–450)
PLATELET # BLD: 183 K/UL (ref 140–450)
PLATELET # BLD: 188 K/UL (ref 140–450)
PLATELET # BLD: 192 K/UL (ref 140–450)
PLATELET ESTIMATE: ABNORMAL
PMV BLD AUTO: 7.8 FL (ref 6–12)
PMV BLD AUTO: 7.8 FL (ref 6–12)
PMV BLD AUTO: 8 FL (ref 6–12)
PMV BLD AUTO: 8.1 FL (ref 6–12)
PMV BLD AUTO: 8.2 FL (ref 6–12)
PMV BLD AUTO: 8.3 FL (ref 6–12)
PMV BLD AUTO: 8.4 FL (ref 6–12)
PMV BLD AUTO: 8.5 FL (ref 6–12)
PMV BLD AUTO: 8.7 FL (ref 6–12)
PMV BLD AUTO: 8.8 FL (ref 6–12)
PMV BLD AUTO: 8.9 FL (ref 6–12)
PMV BLD AUTO: 8.9 FL (ref 6–12)
PMV BLD AUTO: 9.1 FL (ref 6–12)
PMV BLD AUTO: 9.3 FL (ref 6–12)
POTASSIUM SERPL-SCNC: 3.4 MMOL/L (ref 3.7–5.3)
POTASSIUM SERPL-SCNC: 4.1 MMOL/L (ref 3.7–5.3)
POTASSIUM SERPL-SCNC: 4.2 MMOL/L (ref 3.7–5.3)
POTASSIUM SERPL-SCNC: 4.3 MMOL/L (ref 3.7–5.3)
POTASSIUM SERPL-SCNC: 4.5 MMOL/L (ref 3.7–5.3)
POTASSIUM SERPL-SCNC: 4.6 MMOL/L (ref 3.7–5.3)
POTASSIUM SERPL-SCNC: 4.6 MMOL/L (ref 3.7–5.3)
POTASSIUM SERPL-SCNC: 4.7 MMOL/L (ref 3.7–5.3)
POTASSIUM SERPL-SCNC: 4.8 MMOL/L (ref 3.7–5.3)
POTASSIUM SERPL-SCNC: 4.9 MMOL/L (ref 3.7–5.3)
POTASSIUM SERPL-SCNC: 4.9 MMOL/L (ref 3.7–5.3)
POTASSIUM SERPL-SCNC: 5 MMOL/L (ref 3.7–5.3)
POTASSIUM SERPL-SCNC: 5.1 MMOL/L (ref 3.7–5.3)
POTASSIUM SERPL-SCNC: 5.1 MMOL/L (ref 3.7–5.3)
POTASSIUM SERPL-SCNC: 5.2 MMOL/L (ref 3.7–5.3)
POTASSIUM SERPL-SCNC: 5.2 MMOL/L (ref 3.7–5.3)
POTASSIUM SERPL-SCNC: 5.3 MMOL/L (ref 3.7–5.3)
POTASSIUM SERPL-SCNC: 5.4 MMOL/L (ref 3.7–5.3)
POTASSIUM SERPL-SCNC: 5.5 MMOL/L (ref 3.7–5.3)
POTASSIUM SERPL-SCNC: 5.5 MMOL/L (ref 3.7–5.3)
POTASSIUM SERPL-SCNC: 5.6 MMOL/L (ref 3.7–5.3)
POTASSIUM SERPL-SCNC: 5.6 MMOL/L (ref 3.7–5.3)
POTASSIUM SERPL-SCNC: 5.8 MMOL/L (ref 3.7–5.3)
POTASSIUM SERPL-SCNC: 5.9 MMOL/L (ref 3.7–5.3)
POTASSIUM SERPL-SCNC: 6 MMOL/L (ref 3.7–5.3)
PRO-BNP: ABNORMAL PG/ML
PROSTATE SPECIFIC ANTIGEN: 0.01 UG/L
PROTEIN UA: ABNORMAL
PROTEIN UA: NEGATIVE
PROTHROMBIN TIME: 17.6 SEC (ref 9.4–11.3)
PROTHROMBIN TIME: 19.5 SEC (ref 9.4–11.3)
PROTHROMBIN TIME: 19.6 SEC (ref 9.4–11.3)
PROTHROMBIN TIME: 20.9 SEC (ref 9.4–11.3)
PROTHROMBIN TIME: 21.8 SEC (ref 9.4–11.3)
PROTHROMBIN TIME: 22.9 SEC (ref 9.4–11.3)
PROTHROMBIN TIME: 25.7 SEC (ref 9.4–11.3)
PROTHROMBIN TIME: 27.5 SEC (ref 9.4–11.3)
PROTHROMBIN TIME: 28.9 SEC (ref 9.4–11.3)
PROTHROMBIN TIME: 30 SEC (ref 9.4–11.3)
PROTHROMBIN TIME: 30.1 SEC (ref 9.4–11.3)
PROTHROMBIN TIME: 30.1 SEC (ref 9.4–11.3)
PROTHROMBIN TIME: 30.4 SEC (ref 9.4–11.3)
PROTHROMBIN TIME: 30.9 SEC (ref 9.4–11.3)
PROTHROMBIN TIME: 34.2 SEC (ref 9.4–11.3)
PROTHROMBIN TIME: 35.9 SEC (ref 9.4–11.3)
PROTHROMBIN TIME: 36.2 SEC (ref 9.4–11.3)
PROTHROMBIN TIME: 36.9 SEC (ref 9.4–11.3)
PROTHROMBIN TIME: 37.3 SEC (ref 9.4–11.3)
PROTHROMBIN TIME: 38.4 SEC (ref 9.4–11.3)
PROTHROMBIN TIME: 42.3 SEC (ref 9.4–11.3)
RBC # BLD: 3.1 M/UL (ref 4.5–5.9)
RBC # BLD: 3.25 M/UL (ref 4.5–5.9)
RBC # BLD: 3.25 M/UL (ref 4.5–5.9)
RBC # BLD: 3.26 M/UL (ref 4.5–5.9)
RBC # BLD: 3.3 M/UL (ref 4.5–5.9)
RBC # BLD: 3.31 M/UL (ref 4.5–5.9)
RBC # BLD: 3.34 M/UL (ref 4.5–5.9)
RBC # BLD: 3.35 M/UL (ref 4.5–5.9)
RBC # BLD: 3.36 M/UL (ref 4.5–5.9)
RBC # BLD: 3.41 M/UL (ref 4.5–5.9)
RBC # BLD: 3.41 M/UL (ref 4.5–5.9)
RBC # BLD: 3.42 M/UL (ref 4.5–5.9)
RBC # BLD: 3.42 M/UL (ref 4.5–5.9)
RBC # BLD: 3.43 M/UL (ref 4.5–5.9)
RBC # BLD: 3.46 M/UL (ref 4.5–5.9)
RBC # BLD: 3.47 M/UL (ref 4.5–5.9)
RBC # BLD: 3.48 M/UL (ref 4.5–5.9)
RBC # BLD: 3.54 M/UL (ref 4.5–5.9)
RBC # BLD: 3.55 M/UL (ref 4.5–5.9)
RBC # BLD: 3.61 M/UL (ref 4.5–5.9)
RBC # BLD: 3.62 M/UL (ref 4.5–5.9)
RBC # BLD: 3.68 M/UL (ref 4.5–5.9)
RBC # BLD: 3.77 M/UL (ref 4.5–5.9)
RBC # BLD: 3.81 M/UL (ref 4.5–5.9)
RBC # BLD: ABNORMAL 10*6/UL
RBC UA: ABNORMAL /HPF (ref 0–4)
RBC UA: NORMAL /HPF (ref 0–4)
RENAL EPITHELIAL, UA: ABNORMAL /HPF
RENAL EPITHELIAL, UA: NORMAL /HPF
SEG NEUTROPHILS: 74 % (ref 44–74)
SEG NEUTROPHILS: 75 % (ref 44–74)
SEG NEUTROPHILS: 75 % (ref 44–74)
SEG NEUTROPHILS: 76 % (ref 44–74)
SEG NEUTROPHILS: 77 % (ref 44–74)
SEG NEUTROPHILS: 78 % (ref 44–74)
SEG NEUTROPHILS: 79 % (ref 44–74)
SEG NEUTROPHILS: 79 % (ref 44–74)
SEG NEUTROPHILS: 80 % (ref 44–74)
SEG NEUTROPHILS: 81 % (ref 44–74)
SEG NEUTROPHILS: 82 % (ref 44–74)
SEG NEUTROPHILS: 82 % (ref 44–74)
SEG NEUTROPHILS: 83 % (ref 44–74)
SEG NEUTROPHILS: 88 % (ref 44–74)
SEGMENTED NEUTROPHILS ABSOLUTE COUNT: 4.2 K/UL (ref 1.8–7.7)
SEGMENTED NEUTROPHILS ABSOLUTE COUNT: 4.5 K/UL (ref 1.8–7.7)
SEGMENTED NEUTROPHILS ABSOLUTE COUNT: 4.5 K/UL (ref 1.8–7.7)
SEGMENTED NEUTROPHILS ABSOLUTE COUNT: 4.6 K/UL (ref 1.8–7.7)
SEGMENTED NEUTROPHILS ABSOLUTE COUNT: 4.7 K/UL (ref 1.8–7.7)
SEGMENTED NEUTROPHILS ABSOLUTE COUNT: 5 K/UL (ref 1.8–7.7)
SEGMENTED NEUTROPHILS ABSOLUTE COUNT: 5 K/UL (ref 1.8–7.7)
SEGMENTED NEUTROPHILS ABSOLUTE COUNT: 5.1 K/UL (ref 1.8–7.7)
SEGMENTED NEUTROPHILS ABSOLUTE COUNT: 5.1 K/UL (ref 1.8–7.7)
SEGMENTED NEUTROPHILS ABSOLUTE COUNT: 5.29 K/UL (ref 1.8–7.7)
SEGMENTED NEUTROPHILS ABSOLUTE COUNT: 5.3 K/UL (ref 1.8–7.7)
SEGMENTED NEUTROPHILS ABSOLUTE COUNT: 5.3 K/UL (ref 1.8–7.7)
SEGMENTED NEUTROPHILS ABSOLUTE COUNT: 5.4 K/UL (ref 1.8–7.7)
SEGMENTED NEUTROPHILS ABSOLUTE COUNT: 5.5 K/UL (ref 1.8–7.7)
SEGMENTED NEUTROPHILS ABSOLUTE COUNT: 5.7 K/UL (ref 1.8–7.7)
SEGMENTED NEUTROPHILS ABSOLUTE COUNT: 5.7 K/UL (ref 1.8–7.7)
SEGMENTED NEUTROPHILS ABSOLUTE COUNT: 5.9 K/UL (ref 1.8–7.7)
SEGMENTED NEUTROPHILS ABSOLUTE COUNT: 6.1 K/UL (ref 1.8–7.7)
SEGMENTED NEUTROPHILS ABSOLUTE COUNT: 6.1 K/UL (ref 1.8–7.7)
SEGMENTED NEUTROPHILS ABSOLUTE COUNT: 6.6 K/UL (ref 1.8–7.7)
SEGMENTED NEUTROPHILS ABSOLUTE COUNT: 7.3 K/UL (ref 1.8–7.7)
SEGMENTED NEUTROPHILS ABSOLUTE COUNT: 7.4 K/UL (ref 1.8–7.7)
SODIUM BLD-SCNC: 130 MMOL/L (ref 135–144)
SODIUM BLD-SCNC: 131 MMOL/L (ref 135–144)
SODIUM BLD-SCNC: 131 MMOL/L (ref 135–144)
SODIUM BLD-SCNC: 132 MMOL/L (ref 135–144)
SODIUM BLD-SCNC: 133 MMOL/L (ref 135–144)
SODIUM BLD-SCNC: 133 MMOL/L (ref 135–144)
SODIUM BLD-SCNC: 134 MMOL/L (ref 135–144)
SODIUM BLD-SCNC: 135 MMOL/L (ref 135–144)
SODIUM BLD-SCNC: 136 MMOL/L (ref 135–144)
SODIUM BLD-SCNC: 137 MMOL/L (ref 135–144)
SODIUM BLD-SCNC: 137 MMOL/L (ref 135–144)
SODIUM BLD-SCNC: 138 MMOL/L (ref 135–144)
SPECIFIC GRAVITY UA: 1.01 (ref 1.01–1.02)
SPECIFIC GRAVITY UA: 1.02 (ref 1.01–1.02)
SPECIMEN DESCRIPTION: ABNORMAL
SPECIMEN DESCRIPTION: ABNORMAL
SPECIMEN DESCRIPTION: NORMAL
STATUS: ABNORMAL
STATUS: NORMAL
SURGICAL PATHOLOGY REPORT: NORMAL
THYROXINE, FREE: 0.96 NG/DL (ref 0.93–1.7)
TOTAL IRON BINDING CAPACITY: 312 UG/DL (ref 250–450)
TOTAL PROTEIN: 7 G/DL (ref 6.4–8.3)
TOTAL PROTEIN: 7.1 G/DL (ref 6.4–8.3)
TRICHOMONAS: ABNORMAL
TRICHOMONAS: NORMAL
TRIGL SERPL-MCNC: 48 MG/DL
TRIGL SERPL-MCNC: 52 MG/DL
TRIGL SERPL-MCNC: 52 MG/DL
TROPONIN INTERP: ABNORMAL
TROPONIN T: 0.12 NG/ML
TROPONIN T: 0.12 NG/ML
TROPONIN T: 0.13 NG/ML
TROPONIN T: 0.14 NG/ML
TROPONIN T: 0.15 NG/ML
TROPONIN T: 0.16 NG/ML
TROPONIN T: 0.17 NG/ML
TROPONIN T: 0.17 NG/ML
TROPONIN T: 0.18 NG/ML
TROPONIN T: 0.19 NG/ML
TSH SERPL DL<=0.05 MIU/L-ACNC: 6.44 MIU/L (ref 0.3–5)
TURBIDITY: ABNORMAL
TURBIDITY: NORMAL
UNSATURATED IRON BINDING CAPACITY: 259 UG/DL (ref 112–347)
URINE HGB: NEGATIVE
UROBILINOGEN, URINE: NORMAL
VITAMIN D 25-HYDROXY: 28.6 NG/ML (ref 30–100)
VLDLC SERPL CALC-MCNC: ABNORMAL MG/DL (ref 1–30)
WBC # BLD: 5.6 K/UL (ref 3.5–11)
WBC # BLD: 5.8 K/UL (ref 3.5–11)
WBC # BLD: 5.9 K/UL (ref 3.5–11)
WBC # BLD: 6 K/UL (ref 3.5–11)
WBC # BLD: 6.1 K/UL (ref 3.5–11)
WBC # BLD: 6.2 K/UL (ref 3.5–11)
WBC # BLD: 6.4 K/UL (ref 3.5–11)
WBC # BLD: 6.5 K/UL (ref 3.5–11)
WBC # BLD: 6.5 K/UL (ref 3.5–11)
WBC # BLD: 6.6 K/UL (ref 3.5–11)
WBC # BLD: 6.6 K/UL (ref 3.5–11)
WBC # BLD: 6.7 K/UL (ref 3.5–11)
WBC # BLD: 6.7 K/UL (ref 3.5–11)
WBC # BLD: 6.8 K/UL (ref 3.5–11)
WBC # BLD: 7 K/UL (ref 3.5–11)
WBC # BLD: 7.3 K/UL (ref 3.5–11)
WBC # BLD: 7.4 K/UL (ref 3.5–11)
WBC # BLD: 7.6 K/UL (ref 3.5–11)
WBC # BLD: 7.9 K/UL (ref 3.5–11)
WBC # BLD: 8.1 K/UL (ref 3.5–11)
WBC # BLD: 8.4 K/UL (ref 3.5–11)
WBC # BLD: 8.9 K/UL (ref 3.5–11)
WBC # BLD: ABNORMAL 10*3/UL
WBC UA: ABNORMAL /HPF (ref 0–4)
WBC UA: NORMAL /HPF (ref 0–4)
YEAST: ABNORMAL
YEAST: NORMAL

## 2018-01-01 PROCEDURE — 36415 COLL VENOUS BLD VENIPUNCTURE: CPT

## 2018-01-01 PROCEDURE — 85025 COMPLETE CBC W/AUTO DIFF WBC: CPT

## 2018-01-01 PROCEDURE — 80048 BASIC METABOLIC PNL TOTAL CA: CPT

## 2018-01-01 PROCEDURE — 84132 ASSAY OF SERUM POTASSIUM: CPT

## 2018-01-01 PROCEDURE — 1123F ACP DISCUSS/DSCN MKR DOCD: CPT | Performed by: ORTHOPAEDIC SURGERY

## 2018-01-01 PROCEDURE — 84484 ASSAY OF TROPONIN QUANT: CPT

## 2018-01-01 PROCEDURE — 85610 PROTHROMBIN TIME: CPT

## 2018-01-01 PROCEDURE — G8427 DOCREV CUR MEDS BY ELIG CLIN: HCPCS | Performed by: INTERNAL MEDICINE

## 2018-01-01 PROCEDURE — 85027 COMPLETE CBC AUTOMATED: CPT

## 2018-01-01 PROCEDURE — 1123F ACP DISCUSS/DSCN MKR DOCD: CPT | Performed by: NURSE PRACTITIONER

## 2018-01-01 PROCEDURE — 6370000000 HC RX 637 (ALT 250 FOR IP): Performed by: NURSE PRACTITIONER

## 2018-01-01 PROCEDURE — 97166 OT EVAL MOD COMPLEX 45 MIN: CPT | Performed by: OCCUPATIONAL THERAPIST

## 2018-01-01 PROCEDURE — 6370000000 HC RX 637 (ALT 250 FOR IP): Performed by: PHYSICIAN ASSISTANT

## 2018-01-01 PROCEDURE — 99309 SBSQ NF CARE MODERATE MDM 30: CPT | Performed by: NURSE PRACTITIONER

## 2018-01-01 PROCEDURE — 97165 OT EVAL LOW COMPLEX 30 MIN: CPT | Performed by: OCCUPATIONAL THERAPIST

## 2018-01-01 PROCEDURE — 2060000000 HC ICU INTERMEDIATE R&B

## 2018-01-01 PROCEDURE — 83874 ASSAY OF MYOGLOBIN: CPT

## 2018-01-01 PROCEDURE — 3288F FALL RISK ASSESSMENT DOCD: CPT | Performed by: INTERNAL MEDICINE

## 2018-01-01 PROCEDURE — 99217 PR OBSERVATION CARE DISCHARGE MANAGEMENT: CPT | Performed by: FAMILY MEDICINE

## 2018-01-01 PROCEDURE — 97110 THERAPEUTIC EXERCISES: CPT | Performed by: PHYSICAL THERAPIST

## 2018-01-01 PROCEDURE — 11721 DEBRIDE NAIL 6 OR MORE: CPT | Performed by: PODIATRIST

## 2018-01-01 PROCEDURE — 92611 MOTION FLUOROSCOPY/SWALLOW: CPT | Performed by: SPEECH-LANGUAGE PATHOLOGIST

## 2018-01-01 PROCEDURE — 99238 HOSP IP/OBS DSCHRG MGMT 30/<: CPT | Performed by: INTERNAL MEDICINE

## 2018-01-01 PROCEDURE — 2580000003 HC RX 258: Performed by: PHYSICIAN ASSISTANT

## 2018-01-01 PROCEDURE — G8420 CALC BMI NORM PARAMETERS: HCPCS | Performed by: PODIATRIST

## 2018-01-01 PROCEDURE — 1036F TOBACCO NON-USER: CPT | Performed by: ORTHOPAEDIC SURGERY

## 2018-01-01 PROCEDURE — G8998 SWALLOW D/C STATUS: HCPCS | Performed by: SPEECH-LANGUAGE PATHOLOGIST

## 2018-01-01 PROCEDURE — G8979 MOBILITY GOAL STATUS: HCPCS | Performed by: PHYSICAL THERAPIST

## 2018-01-01 PROCEDURE — 99213 OFFICE O/P EST LOW 20 MIN: CPT | Performed by: NURSE PRACTITIONER

## 2018-01-01 PROCEDURE — 71046 X-RAY EXAM CHEST 2 VIEWS: CPT

## 2018-01-01 PROCEDURE — 1036F TOBACCO NON-USER: CPT | Performed by: INTERNAL MEDICINE

## 2018-01-01 PROCEDURE — 94761 N-INVAS EAR/PLS OXIMETRY MLT: CPT

## 2018-01-01 PROCEDURE — 93005 ELECTROCARDIOGRAM TRACING: CPT

## 2018-01-01 PROCEDURE — 94760 N-INVAS EAR/PLS OXIMETRY 1: CPT

## 2018-01-01 PROCEDURE — G0378 HOSPITAL OBSERVATION PER HR: HCPCS

## 2018-01-01 PROCEDURE — 72125 CT NECK SPINE W/O DYE: CPT

## 2018-01-01 PROCEDURE — 83735 ASSAY OF MAGNESIUM: CPT

## 2018-01-01 PROCEDURE — 85018 HEMOGLOBIN: CPT

## 2018-01-01 PROCEDURE — 6370000000 HC RX 637 (ALT 250 FOR IP)

## 2018-01-01 PROCEDURE — 93793 ANTICOAG MGMT PT WARFARIN: CPT | Performed by: FAMILY MEDICINE

## 2018-01-01 PROCEDURE — 99232 SBSQ HOSP IP/OBS MODERATE 35: CPT | Performed by: FAMILY MEDICINE

## 2018-01-01 PROCEDURE — 2580000003 HC RX 258: Performed by: FAMILY MEDICINE

## 2018-01-01 PROCEDURE — 6360000002 HC RX W HCPCS: Performed by: NURSE PRACTITIONER

## 2018-01-01 PROCEDURE — 83036 HEMOGLOBIN GLYCOSYLATED A1C: CPT

## 2018-01-01 PROCEDURE — 83550 IRON BINDING TEST: CPT

## 2018-01-01 PROCEDURE — G8987 SELF CARE CURRENT STATUS: HCPCS | Performed by: OCCUPATIONAL THERAPIST

## 2018-01-01 PROCEDURE — 1200000000 HC SEMI PRIVATE

## 2018-01-01 PROCEDURE — 97116 GAIT TRAINING THERAPY: CPT | Performed by: PHYSICAL THERAPY ASSISTANT

## 2018-01-01 PROCEDURE — 6360000002 HC RX W HCPCS: Performed by: INTERNAL MEDICINE

## 2018-01-01 PROCEDURE — 6360000002 HC RX W HCPCS: Performed by: PHYSICIAN ASSISTANT

## 2018-01-01 PROCEDURE — 92526 ORAL FUNCTION THERAPY: CPT | Performed by: SPEECH-LANGUAGE PATHOLOGIST

## 2018-01-01 PROCEDURE — 99211 OFF/OP EST MAY X REQ PHY/QHP: CPT

## 2018-01-01 PROCEDURE — G8427 DOCREV CUR MEDS BY ELIG CLIN: HCPCS | Performed by: ORTHOPAEDIC SURGERY

## 2018-01-01 PROCEDURE — 2580000003 HC RX 258: Performed by: INTERNAL MEDICINE

## 2018-01-01 PROCEDURE — G8988 SELF CARE GOAL STATUS: HCPCS | Performed by: OCCUPATIONAL THERAPIST

## 2018-01-01 PROCEDURE — G8978 MOBILITY CURRENT STATUS: HCPCS

## 2018-01-01 PROCEDURE — G8598 ASA/ANTIPLAT THER USED: HCPCS | Performed by: INTERNAL MEDICINE

## 2018-01-01 PROCEDURE — 1123F ACP DISCUSS/DSCN MKR DOCD: CPT | Performed by: INTERNAL MEDICINE

## 2018-01-01 PROCEDURE — 2580000003 HC RX 258: Performed by: EMERGENCY MEDICINE

## 2018-01-01 PROCEDURE — G8420 CALC BMI NORM PARAMETERS: HCPCS | Performed by: INTERNAL MEDICINE

## 2018-01-01 PROCEDURE — 97110 THERAPEUTIC EXERCISES: CPT

## 2018-01-01 PROCEDURE — G8996 SWALLOW CURRENT STATUS: HCPCS | Performed by: SPEECH-LANGUAGE PATHOLOGIST

## 2018-01-01 PROCEDURE — 99232 SBSQ HOSP IP/OBS MODERATE 35: CPT | Performed by: INTERNAL MEDICINE

## 2018-01-01 PROCEDURE — 3288F FALL RISK ASSESSMENT DOCD: CPT | Performed by: PODIATRIST

## 2018-01-01 PROCEDURE — G8484 FLU IMMUNIZE NO ADMIN: HCPCS | Performed by: NURSE PRACTITIONER

## 2018-01-01 PROCEDURE — 99285 EMERGENCY DEPT VISIT HI MDM: CPT

## 2018-01-01 PROCEDURE — 84443 ASSAY THYROID STIM HORMONE: CPT

## 2018-01-01 PROCEDURE — 71045 X-RAY EXAM CHEST 1 VIEW: CPT

## 2018-01-01 PROCEDURE — G8598 ASA/ANTIPLAT THER USED: HCPCS | Performed by: PODIATRIST

## 2018-01-01 PROCEDURE — 6370000000 HC RX 637 (ALT 250 FOR IP): Performed by: INTERNAL MEDICINE

## 2018-01-01 PROCEDURE — 1036F TOBACCO NON-USER: CPT | Performed by: NURSE PRACTITIONER

## 2018-01-01 PROCEDURE — 80076 HEPATIC FUNCTION PANEL: CPT

## 2018-01-01 PROCEDURE — G8997 SWALLOW GOAL STATUS: HCPCS | Performed by: SPEECH-LANGUAGE PATHOLOGIST

## 2018-01-01 PROCEDURE — 97162 PT EVAL MOD COMPLEX 30 MIN: CPT

## 2018-01-01 PROCEDURE — 6370000000 HC RX 637 (ALT 250 FOR IP): Performed by: FAMILY MEDICINE

## 2018-01-01 PROCEDURE — 73610 X-RAY EXAM OF ANKLE: CPT

## 2018-01-01 PROCEDURE — 1123F ACP DISCUSS/DSCN MKR DOCD: CPT | Performed by: PODIATRIST

## 2018-01-01 PROCEDURE — 4040F PNEUMOC VAC/ADMIN/RCVD: CPT | Performed by: PODIATRIST

## 2018-01-01 PROCEDURE — 51798 US URINE CAPACITY MEASURE: CPT

## 2018-01-01 PROCEDURE — G8598 ASA/ANTIPLAT THER USED: HCPCS | Performed by: ORTHOPAEDIC SURGERY

## 2018-01-01 PROCEDURE — 97161 PT EVAL LOW COMPLEX 20 MIN: CPT | Performed by: PHYSICAL THERAPIST

## 2018-01-01 PROCEDURE — 83880 ASSAY OF NATRIURETIC PEPTIDE: CPT

## 2018-01-01 PROCEDURE — 97110 THERAPEUTIC EXERCISES: CPT | Performed by: PHYSICAL THERAPY ASSISTANT

## 2018-01-01 PROCEDURE — 36416 COLLJ CAPILLARY BLOOD SPEC: CPT

## 2018-01-01 PROCEDURE — 87086 URINE CULTURE/COLONY COUNT: CPT

## 2018-01-01 PROCEDURE — 74018 RADEX ABDOMEN 1 VIEW: CPT

## 2018-01-01 PROCEDURE — 97530 THERAPEUTIC ACTIVITIES: CPT | Performed by: OCCUPATIONAL THERAPY ASSISTANT

## 2018-01-01 PROCEDURE — 97116 GAIT TRAINING THERAPY: CPT | Performed by: PHYSICAL THERAPIST

## 2018-01-01 PROCEDURE — 99203 OFFICE O/P NEW LOW 30 MIN: CPT | Performed by: ORTHOPAEDIC SURGERY

## 2018-01-01 PROCEDURE — 81001 URINALYSIS AUTO W/SCOPE: CPT

## 2018-01-01 PROCEDURE — 84153 ASSAY OF PSA TOTAL: CPT

## 2018-01-01 PROCEDURE — 99214 OFFICE O/P EST MOD 30 MIN: CPT | Performed by: INTERNAL MEDICINE

## 2018-01-01 PROCEDURE — 99225 PR SBSQ OBSERVATION CARE/DAY 25 MINUTES: CPT | Performed by: INTERNAL MEDICINE

## 2018-01-01 PROCEDURE — 99283 EMERGENCY DEPT VISIT LOW MDM: CPT

## 2018-01-01 PROCEDURE — G8420 CALC BMI NORM PARAMETERS: HCPCS | Performed by: NURSE PRACTITIONER

## 2018-01-01 PROCEDURE — 97535 SELF CARE MNGMENT TRAINING: CPT | Performed by: OCCUPATIONAL THERAPIST

## 2018-01-01 PROCEDURE — G8427 DOCREV CUR MEDS BY ELIG CLIN: HCPCS | Performed by: NURSE PRACTITIONER

## 2018-01-01 PROCEDURE — 82947 ASSAY GLUCOSE BLOOD QUANT: CPT

## 2018-01-01 PROCEDURE — G8598 ASA/ANTIPLAT THER USED: HCPCS | Performed by: NURSE PRACTITIONER

## 2018-01-01 PROCEDURE — 2580000003 HC RX 258: Performed by: NURSE PRACTITIONER

## 2018-01-01 PROCEDURE — 6360000002 HC RX W HCPCS: Performed by: EMERGENCY MEDICINE

## 2018-01-01 PROCEDURE — 99214 OFFICE O/P EST MOD 30 MIN: CPT | Performed by: NURSE PRACTITIONER

## 2018-01-01 PROCEDURE — 4040F PNEUMOC VAC/ADMIN/RCVD: CPT | Performed by: NURSE PRACTITIONER

## 2018-01-01 PROCEDURE — 83540 ASSAY OF IRON: CPT

## 2018-01-01 PROCEDURE — 74230 X-RAY XM SWLNG FUNCJ C+: CPT

## 2018-01-01 PROCEDURE — 87040 BLOOD CULTURE FOR BACTERIA: CPT

## 2018-01-01 PROCEDURE — G8419 CALC BMI OUT NRM PARAM NOF/U: HCPCS | Performed by: INTERNAL MEDICINE

## 2018-01-01 PROCEDURE — 83605 ASSAY OF LACTIC ACID: CPT

## 2018-01-01 PROCEDURE — 82306 VITAMIN D 25 HYDROXY: CPT

## 2018-01-01 PROCEDURE — G8978 MOBILITY CURRENT STATUS: HCPCS | Performed by: PHYSICAL THERAPIST

## 2018-01-01 PROCEDURE — G8980 MOBILITY D/C STATUS: HCPCS

## 2018-01-01 PROCEDURE — 96361 HYDRATE IV INFUSION ADD-ON: CPT

## 2018-01-01 PROCEDURE — 1111F DSCHRG MED/CURRENT MED MERGE: CPT | Performed by: NURSE PRACTITIONER

## 2018-01-01 PROCEDURE — 70450 CT HEAD/BRAIN W/O DYE: CPT

## 2018-01-01 PROCEDURE — 80053 COMPREHEN METABOLIC PANEL: CPT

## 2018-01-01 PROCEDURE — 1111F DSCHRG MED/CURRENT MED MERGE: CPT | Performed by: INTERNAL MEDICINE

## 2018-01-01 PROCEDURE — G8427 DOCREV CUR MEDS BY ELIG CLIN: HCPCS | Performed by: PODIATRIST

## 2018-01-01 PROCEDURE — G8979 MOBILITY GOAL STATUS: HCPCS

## 2018-01-01 PROCEDURE — 99222 1ST HOSP IP/OBS MODERATE 55: CPT | Performed by: INTERNAL MEDICINE

## 2018-01-01 PROCEDURE — 1036F TOBACCO NON-USER: CPT | Performed by: PODIATRIST

## 2018-01-01 PROCEDURE — 97161 PT EVAL LOW COMPLEX 20 MIN: CPT

## 2018-01-01 PROCEDURE — 99202 OFFICE O/P NEW SF 15 MIN: CPT | Performed by: PODIATRIST

## 2018-01-01 PROCEDURE — 96374 THER/PROPH/DIAG INJ IV PUSH: CPT

## 2018-01-01 PROCEDURE — 3288F FALL RISK ASSESSMENT DOCD: CPT | Performed by: ORTHOPAEDIC SURGERY

## 2018-01-01 PROCEDURE — 4040F PNEUMOC VAC/ADMIN/RCVD: CPT | Performed by: ORTHOPAEDIC SURGERY

## 2018-01-01 PROCEDURE — 4040F PNEUMOC VAC/ADMIN/RCVD: CPT | Performed by: INTERNAL MEDICINE

## 2018-01-01 PROCEDURE — 99284 EMERGENCY DEPT VISIT MOD MDM: CPT

## 2018-01-01 PROCEDURE — 99220 PR INITIAL OBSERVATION CARE/DAY 70 MINUTES: CPT | Performed by: INTERNAL MEDICINE

## 2018-01-01 PROCEDURE — 80061 LIPID PANEL: CPT

## 2018-01-01 PROCEDURE — 3288F FALL RISK ASSESSMENT DOCD: CPT | Performed by: NURSE PRACTITIONER

## 2018-01-01 PROCEDURE — 2500000003 HC RX 250 WO HCPCS: Performed by: INTERNAL MEDICINE

## 2018-01-01 PROCEDURE — 97116 GAIT TRAINING THERAPY: CPT

## 2018-01-01 PROCEDURE — 87077 CULTURE AEROBIC IDENTIFY: CPT

## 2018-01-01 PROCEDURE — 96360 HYDRATION IV INFUSION INIT: CPT

## 2018-01-01 PROCEDURE — 2500000003 HC RX 250 WO HCPCS: Performed by: PHYSICIAN ASSISTANT

## 2018-01-01 PROCEDURE — 96376 TX/PRO/DX INJ SAME DRUG ADON: CPT

## 2018-01-01 PROCEDURE — 99239 HOSP IP/OBS DSCHRG MGMT >30: CPT | Performed by: FAMILY MEDICINE

## 2018-01-01 PROCEDURE — G0179 MD RECERTIFICATION HHA PT: HCPCS | Performed by: INTERNAL MEDICINE

## 2018-01-01 PROCEDURE — 51702 INSERT TEMP BLADDER CATH: CPT

## 2018-01-01 PROCEDURE — 87186 SC STD MICRODIL/AGAR DIL: CPT

## 2018-01-01 PROCEDURE — G8484 FLU IMMUNIZE NO ADMIN: HCPCS | Performed by: INTERNAL MEDICINE

## 2018-01-01 PROCEDURE — 72100 X-RAY EXAM L-S SPINE 2/3 VWS: CPT

## 2018-01-01 PROCEDURE — 96372 THER/PROPH/DIAG INJ SC/IM: CPT

## 2018-01-01 PROCEDURE — 12001 RPR S/N/AX/GEN/TRNK 2.5CM/<: CPT

## 2018-01-01 PROCEDURE — G0180 MD CERTIFICATION HHA PATIENT: HCPCS | Performed by: INTERNAL MEDICINE

## 2018-01-01 PROCEDURE — G8482 FLU IMMUNIZE ORDER/ADMIN: HCPCS | Performed by: INTERNAL MEDICINE

## 2018-01-01 PROCEDURE — 1111F DSCHRG MED/CURRENT MED MERGE: CPT | Performed by: ORTHOPAEDIC SURGERY

## 2018-01-01 PROCEDURE — 84439 ASSAY OF FREE THYROXINE: CPT

## 2018-01-01 PROCEDURE — G8419 CALC BMI OUT NRM PARAM NOF/U: HCPCS | Performed by: ORTHOPAEDIC SURGERY

## 2018-01-01 PROCEDURE — 2500000003 HC RX 250 WO HCPCS: Performed by: EMERGENCY MEDICINE

## 2018-01-01 RX ORDER — SODIUM CHLORIDE 0.9 % (FLUSH) 0.9 %
10 SYRINGE (ML) INJECTION PRN
Status: DISCONTINUED | OUTPATIENT
Start: 2018-01-01 | End: 2018-01-01 | Stop reason: HOSPADM

## 2018-01-01 RX ORDER — SPIRONOLACTONE 25 MG/1
25 TABLET ORAL DAILY
Qty: 90 TABLET | Refills: 3 | Status: ON HOLD | OUTPATIENT
Start: 2018-01-01 | End: 2018-01-01 | Stop reason: HOSPADM

## 2018-01-01 RX ORDER — DEXTROSE MONOHYDRATE 50 MG/ML
100 INJECTION, SOLUTION INTRAVENOUS PRN
Status: DISCONTINUED | OUTPATIENT
Start: 2018-01-01 | End: 2018-01-01 | Stop reason: HOSPADM

## 2018-01-01 RX ORDER — LIDOCAINE 50 MG/G
1 PATCH TOPICAL DAILY
Qty: 30 PATCH | Refills: 0
Start: 2018-01-01

## 2018-01-01 RX ORDER — SPIRONOLACTONE 25 MG/1
25 TABLET ORAL DAILY
Status: DISCONTINUED | OUTPATIENT
Start: 2018-01-01 | End: 2018-01-01 | Stop reason: HOSPADM

## 2018-01-01 RX ORDER — SODIUM CHLORIDE 0.9 % (FLUSH) 0.9 %
10 SYRINGE (ML) INJECTION EVERY 12 HOURS SCHEDULED
Status: DISCONTINUED | OUTPATIENT
Start: 2018-01-01 | End: 2018-01-01 | Stop reason: HOSPADM

## 2018-01-01 RX ORDER — WARFARIN SODIUM 2 MG/1
2 TABLET ORAL
Status: COMPLETED | OUTPATIENT
Start: 2018-01-01 | End: 2018-01-01

## 2018-01-01 RX ORDER — FUROSEMIDE 10 MG/ML
40 INJECTION INTRAMUSCULAR; INTRAVENOUS 2 TIMES DAILY
Status: DISCONTINUED | OUTPATIENT
Start: 2018-01-01 | End: 2018-01-01 | Stop reason: HOSPADM

## 2018-01-01 RX ORDER — WARFARIN SODIUM 2 MG/1
4 TABLET ORAL
Status: DISCONTINUED | OUTPATIENT
Start: 2018-01-01 | End: 2018-01-01 | Stop reason: DRUGHIGH

## 2018-01-01 RX ORDER — WARFARIN SODIUM 2 MG/1
2 TABLET ORAL DAILY
Qty: 30 TABLET | Refills: 0 | Status: ON HOLD | OUTPATIENT
Start: 2018-01-01 | End: 2018-01-01 | Stop reason: HOSPADM

## 2018-01-01 RX ORDER — DOBUTAMINE HYDROCHLORIDE 200 MG/100ML
0.5 INJECTION INTRAVENOUS CONTINUOUS
Status: DISCONTINUED | OUTPATIENT
Start: 2018-01-01 | End: 2018-01-01 | Stop reason: HOSPADM

## 2018-01-01 RX ORDER — TAMSULOSIN HYDROCHLORIDE 0.4 MG/1
0.8 CAPSULE ORAL DAILY
Status: DISCONTINUED | OUTPATIENT
Start: 2018-01-01 | End: 2018-01-01 | Stop reason: HOSPADM

## 2018-01-01 RX ORDER — DOXYCYCLINE 100 MG/1
100 CAPSULE ORAL 2 TIMES DAILY
Status: DISCONTINUED | OUTPATIENT
Start: 2018-01-01 | End: 2018-01-01 | Stop reason: HOSPADM

## 2018-01-01 RX ORDER — SODIUM POLYSTYRENE SULFONATE 15 G/60ML
30 SUSPENSION ORAL; RECTAL ONCE
Status: COMPLETED | OUTPATIENT
Start: 2018-01-01 | End: 2018-01-01

## 2018-01-01 RX ORDER — ATORVASTATIN CALCIUM 10 MG/1
10 TABLET, FILM COATED ORAL DAILY
Status: DISCONTINUED | OUTPATIENT
Start: 2018-01-01 | End: 2018-01-01 | Stop reason: HOSPADM

## 2018-01-01 RX ORDER — ONDANSETRON 2 MG/ML
4 INJECTION INTRAMUSCULAR; INTRAVENOUS EVERY 6 HOURS PRN
Status: DISCONTINUED | OUTPATIENT
Start: 2018-01-01 | End: 2018-01-01 | Stop reason: HOSPADM

## 2018-01-01 RX ORDER — ALPRAZOLAM 0.25 MG/1
TABLET ORAL
Status: COMPLETED
Start: 2018-01-01 | End: 2018-01-01

## 2018-01-01 RX ORDER — WARFARIN SODIUM 1 MG/1
1 TABLET ORAL
Status: COMPLETED | OUTPATIENT
Start: 2018-01-01 | End: 2018-01-01

## 2018-01-01 RX ORDER — DOBUTAMINE HYDROCHLORIDE 200 MG/100ML
2 INJECTION INTRAVENOUS CONTINUOUS
Status: DISCONTINUED | OUTPATIENT
Start: 2018-01-01 | End: 2018-01-01

## 2018-01-01 RX ORDER — FERROUS SULFATE 325(65) MG
325 TABLET ORAL
Status: DISCONTINUED | OUTPATIENT
Start: 2018-01-01 | End: 2018-01-01 | Stop reason: HOSPADM

## 2018-01-01 RX ORDER — LIDOCAINE 50 MG/G
1 PATCH TOPICAL DAILY
Status: DISCONTINUED | OUTPATIENT
Start: 2018-01-01 | End: 2018-01-01 | Stop reason: HOSPADM

## 2018-01-01 RX ORDER — SPIRONOLACTONE 25 MG/1
12.5 TABLET ORAL DAILY
Status: DISCONTINUED | OUTPATIENT
Start: 2018-01-01 | End: 2018-01-01 | Stop reason: HOSPADM

## 2018-01-01 RX ORDER — WARFARIN SODIUM 2 MG/1
2 TABLET ORAL
COMMUNITY
End: 2018-01-01 | Stop reason: SDUPTHER

## 2018-01-01 RX ORDER — FUROSEMIDE 10 MG/ML
40 INJECTION INTRAMUSCULAR; INTRAVENOUS ONCE
Status: COMPLETED | OUTPATIENT
Start: 2018-01-01 | End: 2018-01-01

## 2018-01-01 RX ORDER — WARFARIN SODIUM 4 MG/1
4 TABLET ORAL DAILY
Qty: 30 TABLET | Refills: 0 | Status: ON HOLD | OUTPATIENT
Start: 2018-01-01 | End: 2018-01-01 | Stop reason: HOSPADM

## 2018-01-01 RX ORDER — FUROSEMIDE 20 MG/1
40 TABLET ORAL 2 TIMES DAILY
Qty: 30 TABLET | Refills: 0 | Status: SHIPPED | OUTPATIENT
Start: 2018-01-01 | End: 2018-01-01

## 2018-01-01 RX ORDER — HEPARIN SODIUM 5000 [USP'U]/ML
5000 INJECTION, SOLUTION INTRAVENOUS; SUBCUTANEOUS 2 TIMES DAILY
Status: DISCONTINUED | OUTPATIENT
Start: 2018-01-01 | End: 2018-01-01

## 2018-01-01 RX ORDER — ASPIRIN 325 MG
325 TABLET ORAL DAILY
Status: DISCONTINUED | OUTPATIENT
Start: 2018-01-01 | End: 2018-01-01 | Stop reason: HOSPADM

## 2018-01-01 RX ORDER — LIDOCAINE HYDROCHLORIDE 10 MG/ML
20 INJECTION, SOLUTION INFILTRATION; PERINEURAL ONCE
Status: COMPLETED | OUTPATIENT
Start: 2018-01-01 | End: 2018-01-01

## 2018-01-01 RX ORDER — FUROSEMIDE 10 MG/ML
40 INJECTION INTRAMUSCULAR; INTRAVENOUS DAILY
Status: DISCONTINUED | OUTPATIENT
Start: 2018-01-01 | End: 2018-01-01

## 2018-01-01 RX ORDER — TAMSULOSIN HYDROCHLORIDE 0.4 MG/1
0.4 CAPSULE ORAL DAILY
Status: DISCONTINUED | OUTPATIENT
Start: 2018-01-01 | End: 2018-01-01 | Stop reason: HOSPADM

## 2018-01-01 RX ORDER — ASPIRIN 325 MG
325 TABLET ORAL DAILY
COMMUNITY

## 2018-01-01 RX ORDER — ACETAMINOPHEN 325 MG/1
650 TABLET ORAL EVERY 6 HOURS PRN
Status: DISCONTINUED | OUTPATIENT
Start: 2018-01-01 | End: 2018-01-01 | Stop reason: HOSPADM

## 2018-01-01 RX ORDER — UREA 10 %
2 LOTION (ML) TOPICAL NIGHTLY PRN
COMMUNITY

## 2018-01-01 RX ORDER — WARFARIN SODIUM 2 MG/1
TABLET ORAL
Qty: 30 TABLET | Refills: 5 | Status: SHIPPED | OUTPATIENT
Start: 2018-01-01 | End: 2018-01-01 | Stop reason: ALTCHOICE

## 2018-01-01 RX ORDER — FUROSEMIDE 10 MG/ML
20 INJECTION INTRAMUSCULAR; INTRAVENOUS 2 TIMES DAILY
Status: DISCONTINUED | OUTPATIENT
Start: 2018-01-01 | End: 2018-01-01 | Stop reason: HOSPADM

## 2018-01-01 RX ORDER — ASCORBIC ACID 500 MG
500 TABLET ORAL 3 TIMES DAILY
Status: DISCONTINUED | OUTPATIENT
Start: 2018-01-01 | End: 2018-01-01 | Stop reason: HOSPADM

## 2018-01-01 RX ORDER — LEVOTHYROXINE SODIUM 0.03 MG/1
25 TABLET ORAL DAILY
Qty: 30 TABLET | Refills: 0 | Status: SHIPPED | OUTPATIENT
Start: 2018-01-01

## 2018-01-01 RX ORDER — SPIRONOLACTONE 25 MG/1
25 TABLET ORAL DAILY
Qty: 30 TABLET | Refills: 0 | Status: SHIPPED | OUTPATIENT
Start: 2018-01-01 | End: 2018-01-01 | Stop reason: SDUPTHER

## 2018-01-01 RX ORDER — TAMSULOSIN HYDROCHLORIDE 0.4 MG/1
0.4 CAPSULE ORAL DAILY
Status: DISCONTINUED | OUTPATIENT
Start: 2018-01-01 | End: 2018-01-01

## 2018-01-01 RX ORDER — 0.9 % SODIUM CHLORIDE 0.9 %
500 INTRAVENOUS SOLUTION INTRAVENOUS ONCE
Status: COMPLETED | OUTPATIENT
Start: 2018-01-01 | End: 2018-01-01

## 2018-01-01 RX ORDER — WARFARIN SODIUM 4 MG/1
4 TABLET ORAL DAILY
COMMUNITY
End: 2018-01-01 | Stop reason: SDUPTHER

## 2018-01-01 RX ORDER — WARFARIN SODIUM 2 MG/1
2 TABLET ORAL
Status: DISCONTINUED | OUTPATIENT
Start: 2018-01-01 | End: 2018-01-01 | Stop reason: HOSPADM

## 2018-01-01 RX ORDER — DEXTROSE MONOHYDRATE 25 G/50ML
12.5 INJECTION, SOLUTION INTRAVENOUS PRN
Status: DISCONTINUED | OUTPATIENT
Start: 2018-01-01 | End: 2018-01-01 | Stop reason: HOSPADM

## 2018-01-01 RX ORDER — TAMSULOSIN HYDROCHLORIDE 0.4 MG/1
0.4 CAPSULE ORAL
Status: DISCONTINUED | OUTPATIENT
Start: 2018-01-01 | End: 2018-01-01 | Stop reason: HOSPADM

## 2018-01-01 RX ORDER — WARFARIN SODIUM 2 MG/1
2 TABLET ORAL
Status: DISCONTINUED | OUTPATIENT
Start: 2018-01-01 | End: 2018-01-01

## 2018-01-01 RX ORDER — ALPRAZOLAM 1 MG/1
1 TABLET ORAL ONCE
Status: DISCONTINUED | OUTPATIENT
Start: 2018-01-01 | End: 2018-01-01

## 2018-01-01 RX ORDER — MULTIVITAMIN WITH FOLIC ACID 400 MCG
1 TABLET ORAL DAILY
Status: DISCONTINUED | OUTPATIENT
Start: 2018-01-01 | End: 2018-01-01

## 2018-01-01 RX ORDER — BETHANECHOL CHLORIDE 25 MG/1
25 TABLET ORAL 4 TIMES DAILY
Status: DISCONTINUED | OUTPATIENT
Start: 2018-01-01 | End: 2018-01-01 | Stop reason: HOSPADM

## 2018-01-01 RX ORDER — NICOTINE POLACRILEX 4 MG
15 LOZENGE BUCCAL PRN
Status: DISCONTINUED | OUTPATIENT
Start: 2018-01-01 | End: 2018-01-01 | Stop reason: HOSPADM

## 2018-01-01 RX ORDER — ALPRAZOLAM 0.25 MG/1
0.25 TABLET ORAL ONCE
Status: DISCONTINUED | OUTPATIENT
Start: 2018-01-01 | End: 2018-01-01 | Stop reason: HOSPADM

## 2018-01-01 RX ORDER — TAMSULOSIN HYDROCHLORIDE 0.4 MG/1
0.8 CAPSULE ORAL DAILY
Qty: 30 CAPSULE | Refills: 3 | Status: SHIPPED | OUTPATIENT
Start: 2018-01-01

## 2018-01-01 RX ORDER — LEVOTHYROXINE SODIUM 0.03 MG/1
25 TABLET ORAL DAILY
Status: DISCONTINUED | OUTPATIENT
Start: 2018-01-01 | End: 2018-01-01 | Stop reason: HOSPADM

## 2018-01-01 RX ORDER — ATORVASTATIN CALCIUM 10 MG/1
TABLET, FILM COATED ORAL
Qty: 90 TABLET | Refills: 3 | Status: SHIPPED | OUTPATIENT
Start: 2018-01-01

## 2018-01-01 RX ORDER — FUROSEMIDE 40 MG/1
40 TABLET ORAL DAILY
Status: DISCONTINUED | OUTPATIENT
Start: 2018-01-01 | End: 2018-01-01 | Stop reason: HOSPADM

## 2018-01-01 RX ORDER — ATORVASTATIN CALCIUM 10 MG/1
10 TABLET, FILM COATED ORAL NIGHTLY
Status: DISCONTINUED | OUTPATIENT
Start: 2018-01-01 | End: 2018-01-01 | Stop reason: HOSPADM

## 2018-01-01 RX ORDER — ACETAMINOPHEN 325 MG/1
650 TABLET ORAL EVERY 4 HOURS PRN
Status: DISCONTINUED | OUTPATIENT
Start: 2018-01-01 | End: 2018-01-01 | Stop reason: HOSPADM

## 2018-01-01 RX ORDER — SPIRONOLACTONE 25 MG/1
12.5 TABLET ORAL DAILY
Status: DISCONTINUED | OUTPATIENT
Start: 2018-01-01 | End: 2018-01-01

## 2018-01-01 RX ORDER — FUROSEMIDE 20 MG/1
40 TABLET ORAL DAILY
Status: ON HOLD | COMMUNITY
End: 2018-01-01

## 2018-01-01 RX ORDER — ANTIOX #8/OM3/DHA/EPA/LUT/ZEAX 250-2.5 MG
1 CAPSULE ORAL 2 TIMES DAILY
Status: DISCONTINUED | OUTPATIENT
Start: 2018-01-01 | End: 2018-01-01 | Stop reason: HOSPADM

## 2018-01-01 RX ORDER — POTASSIUM CHLORIDE 20 MEQ/1
40 TABLET, EXTENDED RELEASE ORAL ONCE
Status: COMPLETED | OUTPATIENT
Start: 2018-01-01 | End: 2018-01-01

## 2018-01-01 RX ORDER — DOBUTAMINE HYDROCHLORIDE 200 MG/100ML
INJECTION INTRAVENOUS
Status: DISPENSED
Start: 2018-01-01 | End: 2018-01-01

## 2018-01-01 RX ORDER — SODIUM CHLORIDE 9 MG/ML
INJECTION, SOLUTION INTRAVENOUS CONTINUOUS
Status: DISCONTINUED | OUTPATIENT
Start: 2018-01-01 | End: 2018-01-01 | Stop reason: HOSPADM

## 2018-01-01 RX ORDER — WARFARIN SODIUM 2 MG/1
TABLET ORAL
Qty: 30 TABLET | Refills: 0
Start: 2018-01-01 | End: 2018-01-01 | Stop reason: ALTCHOICE

## 2018-01-01 RX ORDER — MELATONIN
1000 DAILY
Status: DISCONTINUED | OUTPATIENT
Start: 2018-01-01 | End: 2018-01-01 | Stop reason: HOSPADM

## 2018-01-01 RX ORDER — DOBUTAMINE HYDROCHLORIDE 200 MG/100ML
2.5 INJECTION INTRAVENOUS CONTINUOUS
Status: DISCONTINUED | OUTPATIENT
Start: 2018-01-01 | End: 2018-01-01

## 2018-01-01 RX ORDER — ANTIOX #8/OM3/DHA/EPA/LUT/ZEAX 250-2.5 MG
1 CAPSULE ORAL 2 TIMES DAILY
Status: DISCONTINUED | OUTPATIENT
Start: 2018-01-01 | End: 2018-01-01

## 2018-01-01 RX ORDER — FUROSEMIDE 20 MG/1
40 TABLET ORAL 2 TIMES DAILY
Qty: 120 TABLET | Refills: 11 | Status: SHIPPED | OUTPATIENT
Start: 2018-01-01 | End: 2018-01-01

## 2018-01-01 RX ORDER — WARFARIN SODIUM 2 MG/1
4 TABLET ORAL
Status: DISCONTINUED | OUTPATIENT
Start: 2018-01-01 | End: 2018-01-01 | Stop reason: HOSPADM

## 2018-01-01 RX ORDER — SPIRONOLACTONE 25 MG/1
25 TABLET ORAL DAILY
Status: DISCONTINUED | OUTPATIENT
Start: 2018-01-01 | End: 2018-01-01

## 2018-01-01 RX ORDER — DOXYCYCLINE HYCLATE 100 MG/1
100 CAPSULE ORAL 2 TIMES DAILY
Status: ON HOLD | COMMUNITY
End: 2018-01-01 | Stop reason: HOSPADM

## 2018-01-01 RX ORDER — FUROSEMIDE 20 MG/1
40 TABLET ORAL DAILY
Qty: 30 TABLET | Refills: 0 | Status: ON HOLD
Start: 2018-01-01 | End: 2018-01-01

## 2018-01-01 RX ORDER — SODIUM POLYSTYRENE SULFONATE 15 G/60ML
SUSPENSION ORAL; RECTAL
Status: COMPLETED
Start: 2018-01-01 | End: 2018-01-01

## 2018-01-01 RX ORDER — SPIRONOLACTONE 25 MG/1
6.25 TABLET ORAL DAILY
Status: DISCONTINUED | OUTPATIENT
Start: 2018-01-01 | End: 2018-01-01 | Stop reason: HOSPADM

## 2018-01-01 RX ORDER — DIPHENHYDRAMINE HCL 25 MG
25 TABLET ORAL EVERY 6 HOURS PRN
Status: DISCONTINUED | OUTPATIENT
Start: 2018-01-01 | End: 2018-01-01

## 2018-01-01 RX ORDER — DIPHENHYDRAMINE HYDROCHLORIDE 50 MG/ML
25 INJECTION INTRAMUSCULAR; INTRAVENOUS NIGHTLY PRN
Status: DISCONTINUED | OUTPATIENT
Start: 2018-01-01 | End: 2018-01-01 | Stop reason: HOSPADM

## 2018-01-01 RX ORDER — SPIRONOLACTONE 25 MG/1
6.25 TABLET ORAL DAILY
Qty: 30 TABLET | Refills: 0
Start: 2018-01-01

## 2018-01-01 RX ORDER — BETHANECHOL CHLORIDE 25 MG/1
25 TABLET ORAL 4 TIMES DAILY
Qty: 120 TABLET | Refills: 0
Start: 2018-01-01

## 2018-01-01 RX ORDER — NYSTATIN 100000 U/G
CREAM TOPICAL
Status: DISCONTINUED
Start: 2018-01-01 | End: 2018-01-01 | Stop reason: WASHOUT

## 2018-01-01 RX ORDER — ALPRAZOLAM 0.25 MG/1
0.25 TABLET ORAL 3 TIMES DAILY PRN
Qty: 28 TABLET | Refills: 0 | Status: SHIPPED | OUTPATIENT
Start: 2018-01-01 | End: 2018-01-01 | Stop reason: HOSPADM

## 2018-01-01 RX ORDER — WARFARIN SODIUM 1 MG/1
TABLET ORAL
Qty: 90 TABLET | Refills: 3 | Status: ON HOLD
Start: 2018-01-01 | End: 2018-01-01 | Stop reason: HOSPADM

## 2018-01-01 RX ORDER — FUROSEMIDE 20 MG/1
40 TABLET ORAL DAILY
Qty: 30 TABLET | Refills: 0 | Status: ON HOLD | OUTPATIENT
Start: 2018-01-01 | End: 2018-01-01

## 2018-01-01 RX ORDER — ACETAMINOPHEN 325 MG/1
650 TABLET ORAL EVERY 4 HOURS PRN
Qty: 120 TABLET | Refills: 0 | COMMUNITY
Start: 2018-01-01

## 2018-01-01 RX ORDER — WARFARIN SODIUM 2 MG/1
2 TABLET ORAL DAILY
Status: DISCONTINUED | OUTPATIENT
Start: 2018-01-01 | End: 2018-01-01 | Stop reason: DRUGHIGH

## 2018-01-01 RX ORDER — SPIRONOLACTONE 25 MG/1
12.5 TABLET ORAL DAILY
Qty: 30 TABLET | Refills: 0 | Status: ON HOLD | OUTPATIENT
Start: 2018-01-01 | End: 2018-01-01 | Stop reason: HOSPADM

## 2018-01-01 RX ORDER — TRAMADOL HYDROCHLORIDE 50 MG/1
50 TABLET ORAL EVERY 8 HOURS PRN
Qty: 20 TABLET | Refills: 0 | Status: SHIPPED | OUTPATIENT
Start: 2018-01-01 | End: 2018-01-01

## 2018-01-01 RX ORDER — ALPRAZOLAM 0.25 MG/1
0.5 TABLET ORAL ONCE
Status: COMPLETED | OUTPATIENT
Start: 2018-01-01 | End: 2018-01-01

## 2018-01-01 RX ORDER — WARFARIN SODIUM 2 MG/1
2 TABLET ORAL
Status: DISCONTINUED | OUTPATIENT
Start: 2018-01-01 | End: 2018-01-01 | Stop reason: DRUGHIGH

## 2018-01-01 RX ORDER — SODIUM POLYSTYRENE SULFONATE 15 G/60ML
SUSPENSION ORAL; RECTAL
Status: DISPENSED
Start: 2018-01-01 | End: 2018-01-01

## 2018-01-01 RX ORDER — FUROSEMIDE 20 MG/1
40 TABLET ORAL 2 TIMES DAILY
Qty: 30 TABLET | Refills: 0
Start: 2018-01-01

## 2018-01-01 RX ORDER — ATORVASTATIN CALCIUM 10 MG/1
10 TABLET, FILM COATED ORAL
Status: DISCONTINUED | OUTPATIENT
Start: 2018-01-01 | End: 2018-01-01 | Stop reason: HOSPADM

## 2018-01-01 RX ORDER — SULFAMETHOXAZOLE AND TRIMETHOPRIM 400; 80 MG/1; MG/1
1 TABLET ORAL 2 TIMES DAILY
COMMUNITY
Start: 2018-01-01

## 2018-01-01 RX ORDER — FUROSEMIDE 10 MG/ML
40 INJECTION INTRAMUSCULAR; INTRAVENOUS DAILY
Status: DISCONTINUED | OUTPATIENT
Start: 2018-01-01 | End: 2018-01-01 | Stop reason: HOSPADM

## 2018-01-01 RX ORDER — WARFARIN SODIUM 2 MG/1
2 TABLET ORAL
Status: DISCONTINUED | OUTPATIENT
Start: 2018-01-01 | End: 2018-01-01 | Stop reason: SDUPTHER

## 2018-01-01 RX ORDER — FERROUS SULFATE 325(65) MG
325 TABLET ORAL
COMMUNITY

## 2018-01-01 RX ADMIN — SERTRALINE HYDROCHLORIDE 100 MG: 50 TABLET ORAL at 20:36

## 2018-01-01 RX ADMIN — FERROUS SULFATE TAB 325 MG (65 MG ELEMENTAL FE) 325 MG: 325 (65 FE) TAB at 12:08

## 2018-01-01 RX ADMIN — SACUBITRIL AND VALSARTAN 1 TABLET: 24; 26 TABLET, FILM COATED ORAL at 08:34

## 2018-01-01 RX ADMIN — Medication 10 ML: at 08:03

## 2018-01-01 RX ADMIN — ENOXAPARIN SODIUM 30 MG: 30 INJECTION SUBCUTANEOUS at 07:59

## 2018-01-01 RX ADMIN — FERROUS SULFATE TAB 325 MG (65 MG ELEMENTAL FE) 325 MG: 325 (65 FE) TAB at 17:02

## 2018-01-01 RX ADMIN — OXYCODONE HYDROCHLORIDE AND ACETAMINOPHEN 500 MG: 500 TABLET ORAL at 08:31

## 2018-01-01 RX ADMIN — FERROUS SULFATE TAB 325 MG (65 MG ELEMENTAL FE) 325 MG: 325 (65 FE) TAB at 08:11

## 2018-01-01 RX ADMIN — LEVOTHYROXINE SODIUM 25 MCG: 25 TABLET ORAL at 12:29

## 2018-01-01 RX ADMIN — FERROUS SULFATE TAB 325 MG (65 MG ELEMENTAL FE) 325 MG: 325 (65 FE) TAB at 13:38

## 2018-01-01 RX ADMIN — TAMSULOSIN HYDROCHLORIDE 0.4 MG: 0.4 CAPSULE ORAL at 08:46

## 2018-01-01 RX ADMIN — FERROUS SULFATE TAB 325 MG (65 MG ELEMENTAL FE) 325 MG: 325 (65 FE) TAB at 17:05

## 2018-01-01 RX ADMIN — TAMSULOSIN HYDROCHLORIDE 0.4 MG: 0.4 CAPSULE ORAL at 08:31

## 2018-01-01 RX ADMIN — DOXYCYCLINE 100 MG: 100 CAPSULE ORAL at 09:15

## 2018-01-01 RX ADMIN — ACETAMINOPHEN 650 MG: 325 TABLET ORAL at 05:13

## 2018-01-01 RX ADMIN — TAMSULOSIN HYDROCHLORIDE 0.4 MG: 0.4 CAPSULE ORAL at 18:19

## 2018-01-01 RX ADMIN — DOBUTAMINE IN DEXTROSE 2 MCG/KG/MIN: 200 INJECTION, SOLUTION INTRAVENOUS at 16:44

## 2018-01-01 RX ADMIN — FERROUS SULFATE TAB 325 MG (65 MG ELEMENTAL FE) 325 MG: 325 (65 FE) TAB at 07:59

## 2018-01-01 RX ADMIN — FUROSEMIDE 40 MG: 10 INJECTION, SOLUTION INTRAMUSCULAR; INTRAVENOUS at 08:32

## 2018-01-01 RX ADMIN — OXYCODONE HYDROCHLORIDE AND ACETAMINOPHEN 500 MG: 500 TABLET ORAL at 22:00

## 2018-01-01 RX ADMIN — INSULIN LISPRO 1 UNITS: 100 INJECTION, SOLUTION INTRAVENOUS; SUBCUTANEOUS at 19:44

## 2018-01-01 RX ADMIN — OXYCODONE HYDROCHLORIDE AND ACETAMINOPHEN 500 MG: 500 TABLET ORAL at 13:44

## 2018-01-01 RX ADMIN — CHOLECALCIFEROL TAB 25 MCG (1000 UNIT) 1000 UNITS: 25 TAB at 09:30

## 2018-01-01 RX ADMIN — ATORVASTATIN CALCIUM 10 MG: 10 TABLET, FILM COATED ORAL at 22:03

## 2018-01-01 RX ADMIN — SODIUM POLYSTYRENE SULFONATE 30 G: 15 SUSPENSION ORAL; RECTAL at 10:29

## 2018-01-01 RX ADMIN — VITAMIN D, TAB 1000IU (100/BT) 1000 UNITS: 25 TAB at 08:23

## 2018-01-01 RX ADMIN — MELATONIN 1000 UNITS: at 13:07

## 2018-01-01 RX ADMIN — ATORVASTATIN CALCIUM 10 MG: 10 TABLET, FILM COATED ORAL at 09:36

## 2018-01-01 RX ADMIN — SERTRALINE HYDROCHLORIDE 100 MG: 50 TABLET ORAL at 07:58

## 2018-01-01 RX ADMIN — OXYCODONE HYDROCHLORIDE AND ACETAMINOPHEN 500 MG: 500 TABLET ORAL at 13:08

## 2018-01-01 RX ADMIN — OXYCODONE HYDROCHLORIDE AND ACETAMINOPHEN 500 MG: 500 TABLET ORAL at 20:13

## 2018-01-01 RX ADMIN — FERROUS SULFATE TAB 325 MG (65 MG ELEMENTAL FE) 325 MG: 325 (65 FE) TAB at 08:34

## 2018-01-01 RX ADMIN — Medication 1 CAPSULE: at 08:14

## 2018-01-01 RX ADMIN — FUROSEMIDE 40 MG: 10 INJECTION, SOLUTION INTRAMUSCULAR; INTRAVENOUS at 19:00

## 2018-01-01 RX ADMIN — CHOLECALCIFEROL TAB 25 MCG (1000 UNIT) 1000 UNITS: 25 TAB at 08:39

## 2018-01-01 RX ADMIN — OXYCODONE HYDROCHLORIDE AND ACETAMINOPHEN 500 MG: 500 TABLET ORAL at 09:36

## 2018-01-01 RX ADMIN — Medication 10 ML: at 22:01

## 2018-01-01 RX ADMIN — SACUBITRIL AND VALSARTAN 1 TABLET: 24; 26 TABLET, FILM COATED ORAL at 08:46

## 2018-01-01 RX ADMIN — LEVOTHYROXINE SODIUM 25 MCG: 25 TABLET ORAL at 05:13

## 2018-01-01 RX ADMIN — ATORVASTATIN CALCIUM 10 MG: 10 TABLET, FILM COATED ORAL at 08:11

## 2018-01-01 RX ADMIN — SPIRONOLACTONE 25 MG: 25 TABLET ORAL at 08:32

## 2018-01-01 RX ADMIN — SPIRONOLACTONE 12.5 MG: 25 TABLET ORAL at 08:10

## 2018-01-01 RX ADMIN — ENOXAPARIN SODIUM 30 MG: 30 INJECTION SUBCUTANEOUS at 08:43

## 2018-01-01 RX ADMIN — Medication 10 ML: at 08:07

## 2018-01-01 RX ADMIN — WARFARIN SODIUM 4 MG: 2 TABLET ORAL at 20:47

## 2018-01-01 RX ADMIN — FUROSEMIDE 20 MG: 10 INJECTION, SOLUTION INTRAMUSCULAR; INTRAVENOUS at 17:38

## 2018-01-01 RX ADMIN — FERROUS SULFATE TAB 325 MG (65 MG ELEMENTAL FE) 325 MG: 325 (65 FE) TAB at 16:44

## 2018-01-01 RX ADMIN — MICONAZOLE NITRATE: 20 POWDER TOPICAL at 09:55

## 2018-01-01 RX ADMIN — MELATONIN 1000 UNITS: at 08:23

## 2018-01-01 RX ADMIN — WARFARIN SODIUM 2 MG: 2 TABLET ORAL at 18:19

## 2018-01-01 RX ADMIN — SACUBITRIL AND VALSARTAN 1 TABLET: 24; 26 TABLET, FILM COATED ORAL at 08:32

## 2018-01-01 RX ADMIN — MICONAZOLE NITRATE: 20 POWDER TOPICAL at 22:33

## 2018-01-01 RX ADMIN — SPIRONOLACTONE 12.5 MG: 25 TABLET ORAL at 17:06

## 2018-01-01 RX ADMIN — Medication 10 ML: at 22:52

## 2018-01-01 RX ADMIN — TAMSULOSIN HYDROCHLORIDE 0.4 MG: 0.4 CAPSULE ORAL at 08:11

## 2018-01-01 RX ADMIN — OXYCODONE HYDROCHLORIDE AND ACETAMINOPHEN 500 MG: 500 TABLET ORAL at 20:36

## 2018-01-01 RX ADMIN — LEVOTHYROXINE SODIUM 25 MCG: 25 TABLET ORAL at 05:54

## 2018-01-01 RX ADMIN — LEVOTHYROXINE SODIUM 25 MCG: 25 TABLET ORAL at 05:58

## 2018-01-01 RX ADMIN — FUROSEMIDE 20 MG: 10 INJECTION, SOLUTION INTRAMUSCULAR; INTRAVENOUS at 08:30

## 2018-01-01 RX ADMIN — DIPHENHYDRAMINE HCL 25 MG: 25 TABLET ORAL at 02:00

## 2018-01-01 RX ADMIN — SACUBITRIL AND VALSARTAN 1 TABLET: 24; 26 TABLET, FILM COATED ORAL at 22:45

## 2018-01-01 RX ADMIN — SERTRALINE HYDROCHLORIDE 100 MG: 50 TABLET ORAL at 08:10

## 2018-01-01 RX ADMIN — ASPIRIN 325 MG ORAL TABLET 325 MG: 325 PILL ORAL at 08:42

## 2018-01-01 RX ADMIN — SERTRALINE HYDROCHLORIDE 100 MG: 50 TABLET ORAL at 08:34

## 2018-01-01 RX ADMIN — FERROUS SULFATE TAB 325 MG (65 MG ELEMENTAL FE) 325 MG: 325 (65 FE) TAB at 13:44

## 2018-01-01 RX ADMIN — OXYCODONE HYDROCHLORIDE AND ACETAMINOPHEN 500 MG: 500 TABLET ORAL at 21:58

## 2018-01-01 RX ADMIN — ACETAMINOPHEN 650 MG: 325 TABLET ORAL at 21:58

## 2018-01-01 RX ADMIN — SACUBITRIL AND VALSARTAN 1 TABLET: 24; 26 TABLET, FILM COATED ORAL at 21:58

## 2018-01-01 RX ADMIN — FERROUS SULFATE TAB 325 MG (65 MG ELEMENTAL FE) 325 MG: 325 (65 FE) TAB at 08:02

## 2018-01-01 RX ADMIN — DOBUTAMINE IN DEXTROSE 2 MCG/KG/MIN: 200 INJECTION, SOLUTION INTRAVENOUS at 02:06

## 2018-01-01 RX ADMIN — MICONAZOLE NITRATE: 20 POWDER TOPICAL at 08:14

## 2018-01-01 RX ADMIN — SPIRONOLACTONE 6.25 MG: 25 TABLET, FILM COATED ORAL at 07:58

## 2018-01-01 RX ADMIN — FUROSEMIDE 20 MG: 10 INJECTION, SOLUTION INTRAMUSCULAR; INTRAVENOUS at 17:03

## 2018-01-01 RX ADMIN — FERROUS SULFATE TAB 325 MG (65 MG ELEMENTAL FE) 325 MG: 325 (65 FE) TAB at 14:00

## 2018-01-01 RX ADMIN — LEVOTHYROXINE SODIUM 25 MCG: 25 TABLET ORAL at 05:15

## 2018-01-01 RX ADMIN — DOBUTAMINE IN DEXTROSE 2.5 MCG/KG/MIN: 200 INJECTION, SOLUTION INTRAVENOUS at 09:12

## 2018-01-01 RX ADMIN — SERTRALINE HYDROCHLORIDE 100 MG: 50 TABLET ORAL at 08:32

## 2018-01-01 RX ADMIN — ATORVASTATIN CALCIUM 10 MG: 10 TABLET, FILM COATED ORAL at 08:32

## 2018-01-01 RX ADMIN — ATORVASTATIN CALCIUM 10 MG: 10 TABLET, FILM COATED ORAL at 11:48

## 2018-01-01 RX ADMIN — Medication 10 ML: at 19:49

## 2018-01-01 RX ADMIN — Medication 10 ML: at 22:00

## 2018-01-01 RX ADMIN — FUROSEMIDE 40 MG: 10 INJECTION, SOLUTION INTRAMUSCULAR; INTRAVENOUS at 18:44

## 2018-01-01 RX ADMIN — Medication 10 ML: at 17:46

## 2018-01-01 RX ADMIN — FERROUS SULFATE TAB 325 MG (65 MG ELEMENTAL FE) 325 MG: 325 (65 FE) TAB at 12:03

## 2018-01-01 RX ADMIN — LIDOCAINE HYDROCHLORIDE 20 ML: 10 INJECTION, SOLUTION INFILTRATION; PERINEURAL at 18:30

## 2018-01-01 RX ADMIN — SACUBITRIL AND VALSARTAN 1 TABLET: 24; 26 TABLET, FILM COATED ORAL at 07:57

## 2018-01-01 RX ADMIN — OXYCODONE HYDROCHLORIDE AND ACETAMINOPHEN 500 MG: 500 TABLET ORAL at 19:51

## 2018-01-01 RX ADMIN — TAMSULOSIN HYDROCHLORIDE 0.4 MG: 0.4 CAPSULE ORAL at 23:26

## 2018-01-01 RX ADMIN — Medication 10 ML: at 08:43

## 2018-01-01 RX ADMIN — ATORVASTATIN CALCIUM 10 MG: 10 TABLET, FILM COATED ORAL at 09:14

## 2018-01-01 RX ADMIN — Medication 10 ML: at 08:32

## 2018-01-01 RX ADMIN — ATORVASTATIN CALCIUM 10 MG: 10 TABLET, FILM COATED ORAL at 08:07

## 2018-01-01 RX ADMIN — WARFARIN SODIUM 4 MG: 2 TABLET ORAL at 00:12

## 2018-01-01 RX ADMIN — DOBUTAMINE IN DEXTROSE 2 MCG/KG/MIN: 200 INJECTION, SOLUTION INTRAVENOUS at 04:35

## 2018-01-01 RX ADMIN — SERTRALINE HYDROCHLORIDE 100 MG: 50 TABLET ORAL at 09:36

## 2018-01-01 RX ADMIN — FERROUS SULFATE TAB 325 MG (65 MG ELEMENTAL FE) 325 MG: 325 (65 FE) TAB at 08:32

## 2018-01-01 RX ADMIN — FERROUS SULFATE TAB 325 MG (65 MG ELEMENTAL FE) 325 MG: 325 (65 FE) TAB at 12:29

## 2018-01-01 RX ADMIN — ATORVASTATIN CALCIUM 10 MG: 10 TABLET, FILM COATED ORAL at 12:29

## 2018-01-01 RX ADMIN — OXYCODONE HYDROCHLORIDE AND ACETAMINOPHEN 500 MG: 500 TABLET ORAL at 21:56

## 2018-01-01 RX ADMIN — FERROUS SULFATE TAB 325 MG (65 MG ELEMENTAL FE) 325 MG: 325 (65 FE) TAB at 12:47

## 2018-01-01 RX ADMIN — Medication 10 ML: at 18:44

## 2018-01-01 RX ADMIN — FERROUS SULFATE TAB 325 MG (65 MG ELEMENTAL FE) 325 MG: 325 (65 FE) TAB at 09:30

## 2018-01-01 RX ADMIN — DOBUTAMINE IN DEXTROSE 2.5 MCG/KG/MIN: 200 INJECTION, SOLUTION INTRAVENOUS at 18:17

## 2018-01-01 RX ADMIN — FUROSEMIDE 20 MG: 10 INJECTION, SOLUTION INTRAMUSCULAR; INTRAVENOUS at 09:36

## 2018-01-01 RX ADMIN — Medication 1 CAPSULE: at 22:51

## 2018-01-01 RX ADMIN — SACUBITRIL AND VALSARTAN 1 TABLET: 24; 26 TABLET, FILM COATED ORAL at 09:17

## 2018-01-01 RX ADMIN — SODIUM CHLORIDE: 9 INJECTION, SOLUTION INTRAVENOUS at 05:10

## 2018-01-01 RX ADMIN — FUROSEMIDE 40 MG: 10 INJECTION, SOLUTION INTRAMUSCULAR; INTRAVENOUS at 08:11

## 2018-01-01 RX ADMIN — ATORVASTATIN CALCIUM 10 MG: 10 TABLET, FILM COATED ORAL at 08:31

## 2018-01-01 RX ADMIN — SACUBITRIL AND VALSARTAN 1 TABLET: 24; 26 TABLET, FILM COATED ORAL at 08:42

## 2018-01-01 RX ADMIN — SPIRONOLACTONE 25 MG: 25 TABLET ORAL at 21:54

## 2018-01-01 RX ADMIN — LEVOTHYROXINE SODIUM 25 MCG: 25 TABLET ORAL at 06:23

## 2018-01-01 RX ADMIN — WARFARIN SODIUM 1 MG: 1 TABLET ORAL at 17:02

## 2018-01-01 RX ADMIN — TAMSULOSIN HYDROCHLORIDE 0.4 MG: 0.4 CAPSULE ORAL at 08:02

## 2018-01-01 RX ADMIN — FUROSEMIDE 40 MG: 10 INJECTION, SOLUTION INTRAMUSCULAR; INTRAVENOUS at 18:19

## 2018-01-01 RX ADMIN — SERTRALINE HYDROCHLORIDE 100 MG: 50 TABLET ORAL at 09:30

## 2018-01-01 RX ADMIN — SACUBITRIL AND VALSARTAN 1 TABLET: 24; 26 TABLET, FILM COATED ORAL at 20:37

## 2018-01-01 RX ADMIN — OXYCODONE HYDROCHLORIDE AND ACETAMINOPHEN 500 MG: 500 TABLET ORAL at 09:30

## 2018-01-01 RX ADMIN — OXYCODONE HYDROCHLORIDE AND ACETAMINOPHEN 500 MG: 500 TABLET ORAL at 16:44

## 2018-01-01 RX ADMIN — ALPRAZOLAM 0.5 MG: 0.25 TABLET ORAL at 04:19

## 2018-01-01 RX ADMIN — OXYCODONE HYDROCHLORIDE AND ACETAMINOPHEN 500 MG: 500 TABLET ORAL at 21:37

## 2018-01-01 RX ADMIN — SACUBITRIL AND VALSARTAN 1 TABLET: 24; 26 TABLET, FILM COATED ORAL at 20:27

## 2018-01-01 RX ADMIN — ACETAMINOPHEN 650 MG: 325 TABLET ORAL at 05:57

## 2018-01-01 RX ADMIN — SODIUM CHLORIDE 500 ML: 0.9 INJECTION, SOLUTION INTRAVENOUS at 14:41

## 2018-01-01 RX ADMIN — CHOLECALCIFEROL TAB 25 MCG (1000 UNIT) 1000 UNITS: 25 TAB at 09:36

## 2018-01-01 RX ADMIN — WARFARIN SODIUM 2 MG: 2 TABLET ORAL at 17:06

## 2018-01-01 RX ADMIN — Medication 10 ML: at 23:38

## 2018-01-01 RX ADMIN — FUROSEMIDE 40 MG: 10 INJECTION, SOLUTION INTRAMUSCULAR; INTRAVENOUS at 08:07

## 2018-01-01 RX ADMIN — FERROUS SULFATE TAB 325 MG (65 MG ELEMENTAL FE) 325 MG: 325 (65 FE) TAB at 17:37

## 2018-01-01 RX ADMIN — SODIUM CHLORIDE: 9 INJECTION, SOLUTION INTRAVENOUS at 16:40

## 2018-01-01 RX ADMIN — ENOXAPARIN SODIUM 30 MG: 30 INJECTION SUBCUTANEOUS at 09:55

## 2018-01-01 RX ADMIN — OXYCODONE HYDROCHLORIDE AND ACETAMINOPHEN 500 MG: 500 TABLET ORAL at 13:14

## 2018-01-01 RX ADMIN — FUROSEMIDE 40 MG: 10 INJECTION, SOLUTION INTRAMUSCULAR; INTRAVENOUS at 16:41

## 2018-01-01 RX ADMIN — FERROUS SULFATE TAB 325 MG (65 MG ELEMENTAL FE) 325 MG: 325 (65 FE) TAB at 09:16

## 2018-01-01 RX ADMIN — SERTRALINE HYDROCHLORIDE 100 MG: 50 TABLET ORAL at 08:31

## 2018-01-01 RX ADMIN — OXYCODONE HYDROCHLORIDE AND ACETAMINOPHEN 500 MG: 500 TABLET ORAL at 12:12

## 2018-01-01 RX ADMIN — FUROSEMIDE 40 MG: 10 INJECTION, SOLUTION INTRAMUSCULAR; INTRAVENOUS at 09:55

## 2018-01-01 RX ADMIN — TAMSULOSIN HYDROCHLORIDE 0.4 MG: 0.4 CAPSULE ORAL at 09:31

## 2018-01-01 RX ADMIN — TAMSULOSIN HYDROCHLORIDE 0.4 MG: 0.4 CAPSULE ORAL at 08:07

## 2018-01-01 RX ADMIN — SERTRALINE HYDROCHLORIDE 100 MG: 50 TABLET ORAL at 09:18

## 2018-01-01 RX ADMIN — OXYCODONE HYDROCHLORIDE AND ACETAMINOPHEN 500 MG: 500 TABLET ORAL at 12:46

## 2018-01-01 RX ADMIN — DOBUTAMINE IN DEXTROSE 0.5 MCG/KG/MIN: 200 INJECTION, SOLUTION INTRAVENOUS at 21:57

## 2018-01-01 RX ADMIN — INSULIN LISPRO 1 UNITS: 100 INJECTION, SOLUTION INTRAVENOUS; SUBCUTANEOUS at 17:03

## 2018-01-01 RX ADMIN — VITAMIN D, TAB 1000IU (100/BT) 1000 UNITS: 25 TAB at 13:07

## 2018-01-01 RX ADMIN — OXYCODONE HYDROCHLORIDE AND ACETAMINOPHEN 500 MG: 500 TABLET ORAL at 07:58

## 2018-01-01 RX ADMIN — FERROUS SULFATE TAB 325 MG (65 MG ELEMENTAL FE) 325 MG: 325 (65 FE) TAB at 12:12

## 2018-01-01 RX ADMIN — WARFARIN SODIUM 2 MG: 2 TABLET ORAL at 16:44

## 2018-01-01 RX ADMIN — SACUBITRIL AND VALSARTAN 1 TABLET: 24; 26 TABLET, FILM COATED ORAL at 21:37

## 2018-01-01 RX ADMIN — OXYCODONE HYDROCHLORIDE AND ACETAMINOPHEN 500 MG: 500 TABLET ORAL at 08:02

## 2018-01-01 RX ADMIN — ATORVASTATIN CALCIUM 10 MG: 10 TABLET, FILM COATED ORAL at 09:30

## 2018-01-01 RX ADMIN — TAMSULOSIN HYDROCHLORIDE 0.4 MG: 0.4 CAPSULE ORAL at 08:32

## 2018-01-01 RX ADMIN — TAMSULOSIN HYDROCHLORIDE 0.4 MG: 0.4 CAPSULE ORAL at 22:08

## 2018-01-01 RX ADMIN — FERROUS SULFATE TAB 325 MG (65 MG ELEMENTAL FE) 325 MG: 325 (65 FE) TAB at 18:19

## 2018-01-01 RX ADMIN — ACETAMINOPHEN 650 MG: 325 TABLET ORAL at 21:54

## 2018-01-01 RX ADMIN — FUROSEMIDE 40 MG: 10 INJECTION, SOLUTION INTRAMUSCULAR; INTRAVENOUS at 08:43

## 2018-01-01 RX ADMIN — TAMSULOSIN HYDROCHLORIDE 0.4 MG: 0.4 CAPSULE ORAL at 09:36

## 2018-01-01 RX ADMIN — BARIUM SULFATE 140 ML: 980 POWDER, FOR SUSPENSION ORAL at 11:48

## 2018-01-01 RX ADMIN — FUROSEMIDE 40 MG: 10 INJECTION, SOLUTION INTRAMUSCULAR; INTRAVENOUS at 17:43

## 2018-01-01 RX ADMIN — SPIRONOLACTONE 25 MG: 25 TABLET ORAL at 08:34

## 2018-01-01 RX ADMIN — Medication 10 ML: at 09:37

## 2018-01-01 RX ADMIN — SACUBITRIL AND VALSARTAN 1 TABLET: 24; 26 TABLET, FILM COATED ORAL at 22:51

## 2018-01-01 RX ADMIN — SERTRALINE HYDROCHLORIDE 100 MG: 50 TABLET ORAL at 21:37

## 2018-01-01 RX ADMIN — SPIRONOLACTONE 12.5 MG: 25 TABLET ORAL at 16:44

## 2018-01-01 RX ADMIN — SACUBITRIL AND VALSARTAN 1 TABLET: 24; 26 TABLET, FILM COATED ORAL at 08:06

## 2018-01-01 RX ADMIN — TAMSULOSIN HYDROCHLORIDE 0.4 MG: 0.4 CAPSULE ORAL at 08:43

## 2018-01-01 RX ADMIN — FERROUS SULFATE TAB 325 MG (65 MG ELEMENTAL FE) 325 MG: 325 (65 FE) TAB at 12:22

## 2018-01-01 RX ADMIN — MICONAZOLE NITRATE: 20 POWDER TOPICAL at 21:41

## 2018-01-01 RX ADMIN — FERROUS SULFATE TAB 325 MG (65 MG ELEMENTAL FE) 325 MG: 325 (65 FE) TAB at 13:07

## 2018-01-01 RX ADMIN — FUROSEMIDE 40 MG: 10 INJECTION, SOLUTION INTRAMUSCULAR; INTRAVENOUS at 17:38

## 2018-01-01 RX ADMIN — FERROUS SULFATE TAB 325 MG (65 MG ELEMENTAL FE) 325 MG: 325 (65 FE) TAB at 08:07

## 2018-01-01 RX ADMIN — SACUBITRIL AND VALSARTAN 1 TABLET: 24; 26 TABLET, FILM COATED ORAL at 08:10

## 2018-01-01 RX ADMIN — OXYCODONE HYDROCHLORIDE AND ACETAMINOPHEN 500 MG: 500 TABLET ORAL at 08:46

## 2018-01-01 RX ADMIN — VITAMIN D, TAB 1000IU (100/BT) 1000 UNITS: 25 TAB at 09:18

## 2018-01-01 RX ADMIN — Medication 10 ML: at 08:33

## 2018-01-01 RX ADMIN — LEVOTHYROXINE SODIUM 25 MCG: 25 TABLET ORAL at 06:40

## 2018-01-01 RX ADMIN — OXYCODONE HYDROCHLORIDE AND ACETAMINOPHEN 500 MG: 500 TABLET ORAL at 23:39

## 2018-01-01 RX ADMIN — OXYCODONE HYDROCHLORIDE AND ACETAMINOPHEN 500 MG: 500 TABLET ORAL at 22:51

## 2018-01-01 RX ADMIN — Medication 10 ML: at 09:30

## 2018-01-01 RX ADMIN — OXYCODONE HYDROCHLORIDE AND ACETAMINOPHEN 500 MG: 500 TABLET ORAL at 15:27

## 2018-01-01 RX ADMIN — FERROUS SULFATE TAB 325 MG (65 MG ELEMENTAL FE) 325 MG: 325 (65 FE) TAB at 09:36

## 2018-01-01 RX ADMIN — FERROUS SULFATE TAB 325 MG (65 MG ELEMENTAL FE) 325 MG: 325 (65 FE) TAB at 18:44

## 2018-01-01 RX ADMIN — Medication 10 ML: at 09:31

## 2018-01-01 RX ADMIN — OXYCODONE HYDROCHLORIDE AND ACETAMINOPHEN 500 MG: 500 TABLET ORAL at 08:32

## 2018-01-01 RX ADMIN — OXYCODONE HYDROCHLORIDE AND ACETAMINOPHEN 500 MG: 500 TABLET ORAL at 08:07

## 2018-01-01 RX ADMIN — SERTRALINE HYDROCHLORIDE 100 MG: 50 TABLET ORAL at 08:06

## 2018-01-01 RX ADMIN — FERROUS SULFATE TAB 325 MG (65 MG ELEMENTAL FE) 325 MG: 325 (65 FE) TAB at 13:14

## 2018-01-01 RX ADMIN — POTASSIUM CHLORIDE 40 MEQ: 20 TABLET, EXTENDED RELEASE ORAL at 09:30

## 2018-01-01 RX ADMIN — SERTRALINE HYDROCHLORIDE 100 MG: 50 TABLET ORAL at 08:42

## 2018-01-01 RX ADMIN — DOBUTAMINE IN DEXTROSE 1.98 MCG/KG/MIN: 200 INJECTION, SOLUTION INTRAVENOUS at 20:00

## 2018-01-01 RX ADMIN — FERROUS SULFATE TAB 325 MG (65 MG ELEMENTAL FE) 325 MG: 325 (65 FE) TAB at 08:46

## 2018-01-01 RX ADMIN — SACUBITRIL AND VALSARTAN 1 TABLET: 24; 26 TABLET, FILM COATED ORAL at 22:00

## 2018-01-01 RX ADMIN — OXYCODONE HYDROCHLORIDE AND ACETAMINOPHEN 500 MG: 500 TABLET ORAL at 08:43

## 2018-01-01 RX ADMIN — Medication 10 ML: at 17:38

## 2018-01-01 RX ADMIN — WARFARIN SODIUM 2 MG: 2 TABLET ORAL at 17:37

## 2018-01-01 RX ADMIN — SACUBITRIL AND VALSARTAN 1 TABLET: 24; 26 TABLET, FILM COATED ORAL at 22:07

## 2018-01-01 RX ADMIN — ATORVASTATIN CALCIUM 10 MG: 10 TABLET, FILM COATED ORAL at 08:34

## 2018-01-01 RX ADMIN — SERTRALINE HYDROCHLORIDE 100 MG: 50 TABLET ORAL at 21:54

## 2018-01-01 RX ADMIN — ACETAMINOPHEN 650 MG: 325 TABLET ORAL at 03:07

## 2018-01-01 RX ADMIN — ATORVASTATIN CALCIUM 10 MG: 10 TABLET, FILM COATED ORAL at 12:08

## 2018-01-01 RX ADMIN — BETHANECHOL CHLORIDE 25 MG: 25 TABLET ORAL at 12:12

## 2018-01-01 RX ADMIN — FERROUS SULFATE TAB 325 MG (65 MG ELEMENTAL FE) 325 MG: 325 (65 FE) TAB at 08:31

## 2018-01-01 RX ADMIN — Medication 10 ML: at 08:34

## 2018-01-01 RX ADMIN — PSYLLIUM HUSK 0.52 G: 20 CAPSULE ORAL at 09:36

## 2018-01-01 RX ADMIN — DOBUTAMINE IN DEXTROSE 2 MCG/KG/MIN: 200 INJECTION, SOLUTION INTRAVENOUS at 19:05

## 2018-01-01 RX ADMIN — FUROSEMIDE 20 MG: 10 INJECTION, SOLUTION INTRAMUSCULAR; INTRAVENOUS at 09:31

## 2018-01-01 RX ADMIN — FERROUS SULFATE TAB 325 MG (65 MG ELEMENTAL FE) 325 MG: 325 (65 FE) TAB at 17:43

## 2018-01-01 RX ADMIN — SACUBITRIL AND VALSARTAN 1 TABLET: 24; 26 TABLET, FILM COATED ORAL at 23:38

## 2018-01-01 RX ADMIN — TAMSULOSIN HYDROCHLORIDE 0.8 MG: 0.4 CAPSULE ORAL at 07:58

## 2018-01-01 RX ADMIN — OXYCODONE HYDROCHLORIDE AND ACETAMINOPHEN 500 MG: 500 TABLET ORAL at 15:21

## 2018-01-01 RX ADMIN — CHOLECALCIFEROL TAB 25 MCG (1000 UNIT) 1000 UNITS: 25 TAB at 08:14

## 2018-01-01 RX ADMIN — ACETAMINOPHEN 650 MG: 325 TABLET ORAL at 07:58

## 2018-01-01 RX ADMIN — FERROUS SULFATE TAB 325 MG (65 MG ELEMENTAL FE) 325 MG: 325 (65 FE) TAB at 08:42

## 2018-01-01 RX ADMIN — OXYCODONE HYDROCHLORIDE AND ACETAMINOPHEN 500 MG: 500 TABLET ORAL at 08:23

## 2018-01-01 RX ADMIN — BETHANECHOL CHLORIDE 25 MG: 25 TABLET ORAL at 20:10

## 2018-01-01 RX ADMIN — FUROSEMIDE 40 MG: 10 INJECTION, SOLUTION INTRAMUSCULAR; INTRAVENOUS at 08:46

## 2018-01-01 RX ADMIN — SPIRONOLACTONE 12.5 MG: 25 TABLET ORAL at 08:06

## 2018-01-01 RX ADMIN — Medication 10 ML: at 21:30

## 2018-01-01 RX ADMIN — ACETAMINOPHEN 650 MG: 325 TABLET ORAL at 05:53

## 2018-01-01 RX ADMIN — FUROSEMIDE 40 MG: 10 INJECTION, SOLUTION INTRAMUSCULAR; INTRAVENOUS at 08:34

## 2018-01-01 RX ADMIN — FUROSEMIDE 40 MG: 10 INJECTION, SOLUTION INTRAMUSCULAR; INTRAVENOUS at 07:57

## 2018-01-01 RX ADMIN — FERROUS SULFATE TAB 325 MG (65 MG ELEMENTAL FE) 325 MG: 325 (65 FE) TAB at 11:48

## 2018-01-01 RX ADMIN — ATORVASTATIN CALCIUM 10 MG: 10 TABLET, FILM COATED ORAL at 20:10

## 2018-01-01 RX ADMIN — DOXYCYCLINE 100 MG: 100 CAPSULE ORAL at 22:06

## 2018-01-01 RX ADMIN — SACUBITRIL AND VALSARTAN 1 TABLET: 24; 26 TABLET, FILM COATED ORAL at 20:13

## 2018-01-01 RX ADMIN — OXYCODONE HYDROCHLORIDE AND ACETAMINOPHEN 500 MG: 500 TABLET ORAL at 21:54

## 2018-01-01 RX ADMIN — ASPIRIN 325 MG ORAL TABLET 325 MG: 325 PILL ORAL at 07:59

## 2018-01-01 RX ADMIN — PSYLLIUM HUSK 0.52 G: 20 CAPSULE ORAL at 09:30

## 2018-01-01 RX ADMIN — BETHANECHOL CHLORIDE 25 MG: 25 TABLET ORAL at 07:57

## 2018-01-01 RX ADMIN — Medication 10 ML: at 08:11

## 2018-01-01 RX ADMIN — Medication 10 ML: at 17:44

## 2018-01-01 RX ADMIN — Medication 10 ML: at 21:40

## 2018-01-01 RX ADMIN — INSULIN LISPRO 1 UNITS: 100 INJECTION, SOLUTION INTRAVENOUS; SUBCUTANEOUS at 12:08

## 2018-01-01 RX ADMIN — SPIRONOLACTONE 25 MG: 25 TABLET, FILM COATED ORAL at 10:01

## 2018-01-01 RX ADMIN — PSYLLIUM HUSK 0.52 G: 20 CAPSULE ORAL at 08:30

## 2018-01-01 RX ADMIN — FUROSEMIDE 40 MG: 10 INJECTION, SOLUTION INTRAMUSCULAR; INTRAVENOUS at 08:02

## 2018-01-01 RX ADMIN — Medication 10 ML: at 08:46

## 2018-01-01 RX ADMIN — SACUBITRIL AND VALSARTAN 1 TABLET: 24; 26 TABLET, FILM COATED ORAL at 08:02

## 2018-01-01 RX ADMIN — OXYCODONE HYDROCHLORIDE AND ACETAMINOPHEN 500 MG: 500 TABLET ORAL at 21:39

## 2018-01-01 ASSESSMENT — PAIN SCALES - GENERAL
PAINLEVEL_OUTOF10: 0
PAINLEVEL_OUTOF10: 6
PAINLEVEL_OUTOF10: 0
PAINLEVEL_OUTOF10: 5
PAINLEVEL_OUTOF10: 0
PAINLEVEL_OUTOF10: 5
PAINLEVEL_OUTOF10: 0
PAINLEVEL_OUTOF10: 3
PAINLEVEL_OUTOF10: 0
PAINLEVEL_OUTOF10: 3
PAINLEVEL_OUTOF10: 0
PAINLEVEL_OUTOF10: 5
PAINLEVEL_OUTOF10: 0
PAINLEVEL_OUTOF10: 5
PAINLEVEL_OUTOF10: 0
PAINLEVEL_OUTOF10: 5
PAINLEVEL_OUTOF10: 0
PAINLEVEL_OUTOF10: 5
PAINLEVEL_OUTOF10: 0
PAINLEVEL_OUTOF10: 5
PAINLEVEL_OUTOF10: 0
PAINLEVEL_OUTOF10: 5
PAINLEVEL_OUTOF10: 0
PAINLEVEL_OUTOF10: 3
PAINLEVEL_OUTOF10: 0
PAINLEVEL_OUTOF10: 2
PAINLEVEL_OUTOF10: 0

## 2018-01-01 ASSESSMENT — ENCOUNTER SYMPTOMS
WHEEZING: 0
HEARTBURN: 0
COUGH: 0
ABDOMINAL PAIN: 0
CONSTIPATION: 0
DIARRHEA: 0
BLOOD IN STOOL: 0
COUGH: 0
ABDOMINAL PAIN: 0
COUGH: 0
WHEEZING: 0
VOMITING: 0
CONSTIPATION: 0
DIARRHEA: 0
VOMITING: 0
DIARRHEA: 0
SORE THROAT: 0
ORTHOPNEA: 0
EYE DISCHARGE: 0
SORE THROAT: 0
DIARRHEA: 0
TROUBLE SWALLOWING: 0
SORE THROAT: 0
NAUSEA: 0
TROUBLE SWALLOWING: 0
VOMITING: 0
COUGH: 0
CONSTIPATION: 0
CONSTIPATION: 0
VOMITING: 0
EYE PAIN: 0
BACK PAIN: 1
NAUSEA: 0
BLOOD IN STOOL: 0
ABDOMINAL PAIN: 0
ABDOMINAL PAIN: 0
DIARRHEA: 0
BACK PAIN: 0
EYE REDNESS: 0
SHORTNESS OF BREATH: 0
BACK PAIN: 0
ABDOMINAL PAIN: 0
NAUSEA: 0
CONSTIPATION: 0
EYE DISCHARGE: 0
BLOOD IN STOOL: 0
BLOOD IN STOOL: 0
EYE PAIN: 0
ORTHOPNEA: 0
SHORTNESS OF BREATH: 0
VOMITING: 0
WHEEZING: 0
SHORTNESS OF BREATH: 1
DIARRHEA: 0
DIARRHEA: 0
VOMITING: 0
SPUTUM PRODUCTION: 0
HEARTBURN: 0
WHEEZING: 0
SPUTUM PRODUCTION: 0
NAUSEA: 0
EYE PAIN: 0
SHORTNESS OF BREATH: 1
CONSTIPATION: 0
NAUSEA: 0
WHEEZING: 0
EYE PAIN: 0
DIARRHEA: 0
EYE PAIN: 0
NAUSEA: 0
ABDOMINAL PAIN: 0
CONSTIPATION: 0
ABDOMINAL PAIN: 0
NAUSEA: 0
SINUS PAIN: 0
SHORTNESS OF BREATH: 0
ABDOMINAL PAIN: 0
VOMITING: 0
SORE THROAT: 0
CONSTIPATION: 0
BACK PAIN: 0
ORTHOPNEA: 0
DIARRHEA: 0
TROUBLE SWALLOWING: 0
SHORTNESS OF BREATH: 0
BLOOD IN STOOL: 0
ABDOMINAL PAIN: 0
CONSTIPATION: 0
VOMITING: 0
SHORTNESS OF BREATH: 0
SHORTNESS OF BREATH: 1
SORE THROAT: 0
BLOOD IN STOOL: 0
ABDOMINAL PAIN: 0
VOMITING: 0
SHORTNESS OF BREATH: 0
VOMITING: 0
WHEEZING: 0
BLOOD IN STOOL: 0
BLOOD IN STOOL: 0
COUGH: 0
BACK PAIN: 0
BLOOD IN STOOL: 0
NAUSEA: 0
SORE THROAT: 0
NAUSEA: 0
BLOOD IN STOOL: 0
EYE DISCHARGE: 0
BACK PAIN: 0
COUGH: 0
COUGH: 0
BACK PAIN: 0
DIARRHEA: 0
EYE REDNESS: 0
BACK PAIN: 0
ABDOMINAL PAIN: 0
PHOTOPHOBIA: 0
SHORTNESS OF BREATH: 0
SORE THROAT: 0
NAUSEA: 0
SHORTNESS OF BREATH: 0
EYE REDNESS: 0

## 2018-01-01 ASSESSMENT — PAIN DESCRIPTION - PAIN TYPE
TYPE: CHRONIC PAIN
TYPE: CHRONIC PAIN
TYPE: ACUTE PAIN
TYPE: ACUTE PAIN
TYPE: CHRONIC PAIN
TYPE: ACUTE PAIN
TYPE: CHRONIC PAIN
TYPE: ACUTE PAIN
TYPE: CHRONIC PAIN

## 2018-01-01 ASSESSMENT — PAIN DESCRIPTION - DESCRIPTORS
DESCRIPTORS: DISCOMFORT
DESCRIPTORS: ACHING
DESCRIPTORS: ACHING;DISCOMFORT
DESCRIPTORS: PATIENT UNABLE TO DESCRIBE
DESCRIPTORS: ACHING;DISCOMFORT
DESCRIPTORS: ACHING
DESCRIPTORS: ACHING;DISCOMFORT;DULL

## 2018-01-01 ASSESSMENT — PAIN DESCRIPTION - LOCATION
LOCATION: GENERALIZED
LOCATION: HIP
LOCATION: BACK
LOCATION: SCROTUM
LOCATION: HIP
LOCATION: BACK
LOCATION: BACK
LOCATION: HIP
LOCATION: SCROTUM
LOCATION: HEAD

## 2018-01-01 ASSESSMENT — PAIN DESCRIPTION - ONSET
ONSET: SUDDEN
ONSET: ON-GOING
ONSET: ON-GOING

## 2018-01-01 ASSESSMENT — PAIN DESCRIPTION - FREQUENCY
FREQUENCY: CONTINUOUS
FREQUENCY: CONTINUOUS
FREQUENCY: INTERMITTENT

## 2018-01-01 ASSESSMENT — PAIN DESCRIPTION - ORIENTATION
ORIENTATION: LEFT
ORIENTATION: POSTERIOR
ORIENTATION: LEFT

## 2018-01-01 ASSESSMENT — PAIN DESCRIPTION - PROGRESSION
CLINICAL_PROGRESSION: NOT CHANGED

## 2018-01-01 ASSESSMENT — PATIENT HEALTH QUESTIONNAIRE - PHQ9
1. LITTLE INTEREST OR PLEASURE IN DOING THINGS: 1
2. FEELING DOWN, DEPRESSED OR HOPELESS: 1
SUM OF ALL RESPONSES TO PHQ QUESTIONS 1-9: 2
SUM OF ALL RESPONSES TO PHQ9 QUESTIONS 1 & 2: 2

## 2018-01-01 ASSESSMENT — PAIN SCALES - WONG BAKER
WONGBAKER_NUMERICALRESPONSE: 4

## 2018-01-09 NOTE — PROGRESS NOTES
Andrea Carlin received counseling on the following healthy behaviors: medication adherence  Reviewed prior labs and health maintenance  Continue current medications except where noted below, diet and exercise. Discussed use, benefit, and side effects of prescribed medications. Barriers to medication compliance addressed. Patient given educational materials - see patient instructions  Was a self-tracking handout given in paper form or via [employer]hart? No    Requested Prescriptions     Signed Prescriptions Disp Refills    diclofenac sodium (VOLTAREN) 1 % GEL 1 Tube 3     Sig: Apply 2 g topically 2 times daily       All patient questions answered. Patient voiced understanding. Quality Measures    Body mass index is 24.31 kg/m². Normal. Weight control planned discussed: healthy diet and regular exercise. BP: 104/70. Blood pressure is normal. Treatment plan consists of: see progress note below. Fall Risk 5/12/2017 5/5/2015 4/28/2014   2 or more falls in past year? no no no   Fall with injury in past year? no no yes     The patient has a history of falls. I did , complete a risk assessment for falls.  A plan of care for falls home safety tips provided    Lab Results   Component Value Date    LDLCHOLESTEROL 41 05/09/2017    (goal LDL reduction with dx if diabetes is 50% LDL reduction)    PHQ Scores 5/12/2017 3/21/2016 12/17/2014 11/26/2013   PHQ2 Score 0 0 0 0   PHQ9 Score 0 0 0 0     Interpretation of Total Score Depression Severity: 1-4 = Minimal depression, 5-9 = Mild depression, 10-14 = Moderate depression, 15-19 = Moderately severe depression, 20-27 = Severe depression

## 2018-01-09 NOTE — PROGRESS NOTES
Chronic Disease Visit Information    BP Readings from Last 3 Encounters:   01/09/18 104/70   10/02/17 92/64   07/27/17 90/60          Hemoglobin A1C (%)   Date Value   01/03/2018 6.8 (H)   05/09/2017 7.3 (H)   11/03/2016 6.9 (H)     Microalb/Crt. Ratio (mcg/mg creat)   Date Value   05/09/2017 12     LDL Cholesterol (mg/dL)   Date Value   05/09/2017 41     HDL (mg/dL)   Date Value   05/09/2017 36 (L)     BUN (mg/dL)   Date Value   07/20/2017 39 (H)     CREATININE (mg/dL)   Date Value   07/20/2017 1.34 (H)     Glucose (mg/dL)   Date Value   07/20/2017 126 (H)            Have you changed or started any medications since your last visit including any over-the-counter medicines, vitamins, or herbal medicines? no   Are you having any side effects from any of your medications? -  no  Have you stopped taking any of your medications? Is so, why? -  no    Have you seen any other physician or provider since your last visit? Yes - Records Requested- [address] [facility]  Have you had any other diagnostic tests since your last visit? No  Have you been seen in the emergency room and/or had an admission to a hospital since we last saw you? No  Have you had your annual diabetic retinal (eye) exam? Yes - Records Obtained  Have you had your routine dental cleaning in the past 6 months? yes - every 6 months    Have you activated your [employer] account? If not, what are your barriers?  No: declines     Patient Care Team:  Marge Hughes MD as PCP - General (Internal Medicine)  Arden Gant CNP as PCP - [facility] Attributed Provider  Anika Gray DO as Consulting Physician (Cardiology)  Yony Felton MD (Ophthalmology)         Medical History Review  Past Medical, Family, and Social History reviewed and does contribute to the patient presenting condition    Health Maintenance   Topic Date Due    DTaP/Tdap/Td vaccine (1 - Tdap) 05/12/2018 (Originally 2/26/2014)    Potassium monitoring  07/20/2018    Creatinine monitoring  07/20/2018

## 2018-01-09 NOTE — PATIENT INSTRUCTIONS
that will allow you to sit while showering. · Get into a tub or shower by putting the weaker leg in first. Get out of a tub or shower with your strong side first.  · Repair loose toilet seats and consider installing a raised toilet seat to make getting on and off the toilet easier. · Keep your bathroom door unlocked while you are in the shower. Where can you learn more? Go to https://[email or web address] Application Solutionspepiceweb.Novogenie. org and sign in to your [employer] account. Enter [phone] in the [employer] box to learn more about \"Preventing Falls: Care Instructions. \"     If you do not have an account, please click on the \"Sign Up Now\" link. Current as of: May 12, 2017  Content Version: 11.5  © 4633-8710 Healthwise, Incorporated. Care instructions adapted under license by [facility] ([facility]). If you have questions about a medical condition or this instruction, always ask your healthcare professional. [address] any warranty or liability for your use of this information.

## 2018-01-19 NOTE — PROGRESS NOTES
Encounters:   01/19/18 98/62   01/09/18 104/70   10/02/17 92/64              Past Medical History:   Diagnosis Date    Anxiety     Atrial fibrillation ([facility]     pacemaker placed    Mitchell esophagus     CHF (congestive heart failure) (HCC)     CKD (chronic kidney disease)     CTS (carpal tunnel syndrome)     Depression     GERD (gastroesophageal reflux disease)     H/O falling     Head injury     Hearing impaired     Hyperlipidemia     Hypertension     Hyponatremia     history of    Jaw fracture (HCC)     History of broken jaw in the  from fisticuffs    Osteoarthritis     Peripheral neuropathy ([facility]     Peripheral vascular disease ([facility]     right internal carotid 40%, left internal carotid 40% stenosis    Prostate cancer ([facility]     post reactive seed implant    Sick sinus syndrome (HCC)     SOB (shortness of breath)     history of mild SOB - spirometry showing minimal restrictive defect (total lung capactiy 74%) with normal diffusion capacity, May 2013    Spinal stenosis     Type II or unspecified type diabetes mellitus without mention of complication, not stated as uncontrolled     Unspecified sleep apnea       Past Surgical History:   Procedure Laterality Date    CARDIAC CATHETERIZATION      coronary artery cath in 2003 showed normal coronaries    CARPAL TUNNEL RELEASE Left May 2015    CHOLECYSTECTOMY      COLONOSCOPY  Dec. 2010    Dr. Vanessa Li      epidermal inclusion cyst from scalp    OTHER SURGICAL HISTORY      brachytherapy for postate cancer    PACEMAKER PLACEMENT  2010    PACEMAKER PLACEMENT      may 2016    PILONIDAL CYST EXCISION      TOTAL HIP ARTHROPLASTY Right 2004    TOTAL HIP ARTHROPLASTY Left 1984    TRANSESOPHAGEAL ECHOCARDIOGRAM  Aug. 2009    Dr. Hernando Gray  Dec. 2010    Dr. Blanca David, findings of Mitchell's esophagus       Family History   Problem Relation Age of Onset    Heart Attack Father     Substance heard.  Pulmonary/Chest: Effort normal and breath sounds normal. He has no wheezes. He has no rales. Abdominal: Soft. He exhibits no distension and no mass. There is no tenderness. There is no rebound. Musculoskeletal: Normal range of motion. He exhibits no edema. Lymphadenopathy:     He has no cervical adenopathy. Neurological: He is alert and oriented to person, place, and time. No cranial nerve deficit (grossly). Skin: Skin is warm and dry. No rash noted. Psychiatric: He has a normal mood and affect. Thought content normal.   Vitals reviewed. BP 98/62 (Site: Right Arm, Position: Sitting, Cuff Size: Medium Adult)   Pulse 76   Resp 20   Ht 5' 9.5\" (1.765 m)   Wt 172 lb (78 kg)   BMI 25.04 kg/m²     Assessment:      1. Congestive heart failure, unspecified congestive heart failure chronicity, unspecified congestive heart failure type [facility])  Basic Metabolic Panel   2. Essential hypertension  Basic Metabolic Panel   3. Type 2 diabetes mellitus without complication, without long-term current use of insulin ([facility])  Basic Metabolic Panel             Plan:      Return if symptoms worsen or fail to improve, for Hypertension, Diabetes, CHF. Orders Placed This Encounter   Procedures    Basic Metabolic Panel     Standing Status:   Future     Standing Expiration Date:   1/19/2019     No orders of the defined types were placed in this encounter. Patient given educational materials - see patient instructions. Discussed use, benefit, and side effects of prescribed medications. All patient questions answered. Pt voiced understanding. Reviewed health maintenance. Instructed to continue current medications, diet and exercise. Patient agreed with treatment plan. Follow up as directed.      Electronically signed by Dima Hightower MD on 1/19/2018 at 11:39 AM

## 2018-01-19 NOTE — PLAN OF CARE
reflexive cough, and deep penetration to the cords with nectar thick and honey thick liquids WITHOUT reflexive cough. Minimal to moderate stasis throughout pharynx which clears minimally with double swallow. Patient is high risk for aspiration and pulmonary compromise. Recommend regular with honey thick liquid diet. Treatment (all modalities/procedures provided must be marked):  []Aural Rehab    []Articulation/Phonological  []Cognitive Rehab    []Voice  []Fluency/Stuttering   []Communication Device Modification  []Dysarthria    [x]Swallow/Oral function-tx may include DPNS or NMES if patient found to have no active cancer and clearance with pacemaker,   []Auditory Comprehension  []Verbal Expression  []Nonverbal Expression  []Pragmatic Use    New Treatment Goals:   See newly established goals above    Long Term Goals: Patient will tolerate regular with thin liquids without aspiration on f/u MBSS     Reason for (continuing) treatment: increase swallow function for safe p.o. intake of regular with thin liquid diet. Rehab Potential:  []Good              [x]Fair   []Poor     Evaluation and plan of treatment reviewed with patient/caregiver: [x]Yes  []No    Recommendations:   [x] Continue previous recommended Frequency of Treatment for therapy   [] Change Frequency:   [] Other:     Electronically signed by:        Mark Wang MS, [address]            Date:1/19/2018    Regulatory Requirements  I have reviewed this plan of care and certify a need for medically necessary rehabilitation services.     Physician Signature:  Date:    Please sign and return to [address]

## 2018-01-22 NOTE — FLOWSHEET NOTE
Outpatient Speech Therapy    [x] [place]  Phone: [phone]  Fax: [phone]      [] [place]  Phone: [phone]  Fax: [phone] THERAPY DAILY PROGRESS NOTE    Patient: Wade Llamas     History Number: [id number]  Age: 80 y.o.      : 7/3/1929     PCP: Aparna Bae MD    Onset date: 18  Referring doctor: Aparna Bae Md  [facility]  [place], [address]  Diagnosis:  oropharyngeal dysphagia        Precautions:  Aspiration, pacemaker, possible active cancer     Date: 2018     Time in: 2:15 pm  Visit:  2/      Time out: 03:00 pm  Total Visits: 2   Insurance information:  Medicare, [employer]  Plan of care signed (Y/N): y  Next re-certification due by:  18    PAIN  [x]No     []Yes      Location: N/A   Pain Rating (0-10 pain scale): N/A   Pain Description: N/A     G-Code (if applicable):     Date / Visit # G-Code Applied:  18  Visit #1       Next due by: Visit #10               Subjective report:         Magy Olvera was accompanied to therapy by his wife, Lan Maher, who sat in on the session. SLP and student clinician also present in room. Magy Olvera indicated that he does not care for the liquid thickener. His wife indicated that he will not drink it. SLP provided more education to patient and wife regarding importance of thickening liquids to prevent aspiration pneumonia. Both verbalized understanding, but wife indicated patient will not likely follow recommendations. Patient's wife indicated patient's overall health and function has declined over the past 2 weeks. He is falling asleep more when sitting, he is hunched over more when he eats, his balance is poor, his speech is slurred at times. He is being followed by his PCP and cardiologist.      Goal 1: Patient will tolerate regular with honey thick liquids without overt s/sx aspiration Patient refusing to drink thickened liquids at home. SLP introduced The [employer] Protocol.   SLP verbally reviewed it with

## 2018-01-23 PROBLEM — I50.23 ACUTE ON CHRONIC SYSTOLIC CHF (CONGESTIVE HEART FAILURE) (HCC): Status: ACTIVE | Noted: 2018-01-01

## 2018-01-23 NOTE — ED PROVIDER NOTES
Hypertension; Hyponatremia; Jaw fracture ([facility] Osteoarthritis; Peripheral neuropathy ([facility] Peripheral vascular disease ([facility] Prostate cancer ([facility] Sick sinus syndrome ([facility] SOB (shortness of breath); Spinal stenosis; Type II or unspecified type diabetes mellitus without mention of complication, not stated as uncontrolled; and Unspecified sleep apnea. SURGICAL HISTORY      has a past surgical history that includes Total hip arthroplasty (Right, 2004); Total hip arthroplasty (Left, 1984); Cholecystectomy; Cardiac catheterization; pacemaker placement (2010); Carpal tunnel release (Left, May 2015); other surgical history; Pilonidal cyst excision; cyst removal; Colonoscopy (Dec. 2010); Upper gastrointestinal endoscopy (Dec. 2010); transesophageal echocardiogram (Aug. 2009); and pacemaker placement.     CURRENT MEDICATIONS       Current Discharge Medication List      CONTINUE these medications which have NOT CHANGED    Details   diclofenac sodium (VOLTAREN) 1 % GEL Apply 2 g topically 2 times daily  Qty: 1 Tube, Refills: 3    Associated Diagnoses: Acute pain of right hip      ferrous sulfate 325 (65 Fe) MG tablet Take 325 mg by mouth 3 times daily (with meals)      furosemide (LASIX) 20 MG tablet TAKE TWO TABLETS BY MOUTH ONCE DAILY  Qty: 180 tablet, Refills: 0      !! warfarin (COUMADIN) 4 MG tablet TAKE ONE TABLET BY MOUTH ONCE DAILY  Qty: 30 tablet, Refills: 11    Comments: Please consider 90 day supplies to promote better adherence      sacubitril-valsartan (ENTRESTO) 24-26 MG per tablet Take 1 tablet by mouth 2 times daily      atorvastatin (LIPITOR) 10 MG tablet TAKE 1 TABLET BY MOUTH ONE TIME A DAY   Qty: 30 tablet, Refills: 11      metFORMIN (GLUCOPHAGE) 500 MG tablet TAKE 1 TABLET BY MOUTH THREE TIMES A DAY   Qty: 90 tablet, Refills: 11      !! warfarin (COUMADIN) 1 MG tablet 2 mg Thurs, 4 mg all other days or as directed per INR  Qty: 90 tablet, Refills: 3      spironolactone (ALDACTONE) 25 MG tablet TAKE ONE TABLET BY MOUTH ONCE DAILY  Qty: 30 tablet, Refills: 11      Ascorbic Acid (VITAMIN C) 500 MG tablet Take 500 mg by mouth 3 times daily      Cholecalciferol (VITAMIN D3) 1000 UNITS TABS Take 1,000 Units by mouth daily      Multiple Vitamins-Minerals (PRESERVISION AREDS 2) CAPS Take 1 capsule by mouth 2 times daily      Psyllium (METAMUCIL PO) Take by mouth daily       CPAP Machine MISC CPAP Machine. Use nightly  Qty: 1 each, Refills: 0    Comments: Fax#: [phone] Attn: Delmi Santos      sertraline (ZOLOFT) 100 MG tablet Take 100 mg by mouth daily. 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 Expires in 3 years  Qty: 2 each, Refills: 0      nitroGLYCERIN (NITROSTAT) 0.4 MG SL tablet Place 1 tablet under the tongue every 5 minutes as needed for Chest pain  Qty: 25 tablet, Refills: 1      !! warfarin (COUMADIN) 1 MG tablet Take 1 mg by mouth daily Or as directed per INR       ! ! - Potential duplicate medications found. Please discuss with provider. ALLERGIES     is allergic to bextra [valdecoxib]; biaxin [clarithromycin]; and vasotec [enalapril]. FAMILY HISTORY     indicated that his mother is . He indicated that his father is . He indicated that the status of his sister is unknown.      family history includes Heart Attack in his father; Mult Sclerosis in his sister; Substance Abuse in his father. SOCIAL HISTORY      reports that he quit smoking about 63 years ago. His smoking use included Cigarettes. He has a 4.00 pack-year smoking history. He has never used smokeless tobacco. He reports that he drinks alcohol. He reports that he does not use drugs. PHYSICAL EXAM     INITIAL VITALS:  height is 5' 9\" (1.753 m) and weight is 74.6 kg (164 lb 6.4 oz).  His tympanic temperature is 97.2 °F (36.2 °C). His blood pressure is 105/66 and his pulse is 69. His respiration is 18 and oxygen saturation is 95%. Physical Exam   Constitutional: He is oriented to person, place, and time and well-developed, well-nourished, and in no distress. He appears not dehydrated. He appears healthy. Non-toxic appearance. He does not have a sickly appearance. No distress. Patient is awake, alert and responding appropriately. HENT:   Head: Normocephalic and atraumatic. Right Ear: External ear normal.   Left Ear: External ear normal.   Nose: Nose normal.   Mouth/Throat: Oropharynx is clear and moist.   Eyes: Conjunctivae, EOM and lids are normal. Pupils are equal, round, and reactive to light. Right eye exhibits no discharge. Left eye exhibits no discharge. No scleral icterus. Neck: Normal range of motion. Neck supple. No JVD present. No spinous process tenderness and no muscular tenderness present. No tracheal deviation present. Cardiovascular: Normal rate, regular rhythm, S1 normal, S2 normal, normal heart sounds, intact distal pulses and normal pulses. Exam reveals no gallop and no friction rub. No murmur heard. No peripheral edema noted. Pulmonary/Chest: Effort normal. No accessory muscle usage or stridor. No tachypnea. No respiratory distress. He has no decreased breath sounds. He has no wheezes. He has rhonchi. He has rales. He exhibits no tenderness, no crepitus and no edema. No chest wall tenderness. Abdominal: Soft. Bowel sounds are normal. He exhibits no distension. There is no tenderness. There is no guarding. Musculoskeletal: Normal range of motion. He exhibits no edema or tenderness. No back tenderness. Lymphadenopathy:     He has no cervical adenopathy. Neurological: He is alert and oriented to person, place, and time. He has normal motor skills, normal sensation, normal strength, normal reflexes and intact cranial nerves. No cranial nerve deficit.  He associated atelectasis. Probable mild CHF. Ct Head Wo Contrast    Result Date: 1/23/2018  EXAMINATION: CT OF THE HEAD WITHOUT CONTRAST  1/23/2018 6:00 pm TECHNIQUE: CT of the head was performed without the administration of intravenous contrast. Dose modulation, iterative reconstruction, and/or weight based adjustment of the mA/kV was utilized to reduce the radiation dose to as low as reasonably achievable. COMPARISON: February 20, 2014 HISTORY: ORDERING SYSTEM PROVIDED HISTORY: slurred speech. improving TECHNOLOGIST PROVIDED HISTORY: Has a \"code stroke\" or \"stroke alert\" been called? ->No Ordering Physician Provided Reason for Exam: ? H/o stroke in the past, patient's health has been declining for the past few weeks, he has slurred speech, recently he had a test done at the [place] that showed signs of optic nerve damage, ?stroke FINDINGS: BRAIN/VENTRICLES: There is no acute intracranial hemorrhage, mass effect or midline shift. No abnormal extra-axial fluid collection. The gray-white differentiation is maintained without evidence of an acute infarct. There is mild prominence of the ventricles and sulci due to parenchymal volume loss. No evidence of hydrocephalus. There are nonspecific areas of hypoattenuation within the periventricular and subcortical white matter, which likely represent chronic microvascular ischemic change. Remote lacunar infarct left basal ganglia. ORBITS: The visualized portion of the orbits demonstrate no acute abnormality. SINUSES: The visualized paranasal sinuses and mastoid air cells demonstrate no acute abnormality. SOFT TISSUES/SKULL: No acute abnormality of the visualized skull or soft tissues. Stable CT brain with no acute intracranial abnormality and chronic findings as described.      Fl Modified Barium Swallow W Video    Result Date: 1/18/2018  EXAMINATION: MODIFIED BARIUM SWALLOW WAS PERFORMED IN CONJUNCTION WITH SPEECH PATHOLOGY SERVICES TECHNIQUE: Fluoroscopic evaluation of the swallowing mechanism was performed with multiple consistency of barium product. FLUOROSCOPY DOSE AND TYPE OR TIME AND EXPOSURES: 4.5 minutes. 2 images was obtained. COMPARISON: None HISTORY: ORDERING SYSTEM PROVIDED HISTORY: Dysphagia, unspecified type FINDINGS: Aspiration without cough reflex was seen with the thin barium consistency. Penetration was identified with all given liquid consistencies. Aspiration without cough reflex was seen with the thin barium consistency. Penetration was identified with all given liquid consistencies. Please see separate speech pathology report for full discussion of findings and recommendations. LABS:  Results for orders placed or performed during the hospital encounter of 01/23/18   Culture Blood #1   Result Value Ref Range    Specimen Description       . BLOOD Performed at [facility] [address]    Specimen Description  [address] [address] [phone]     Special Requests NOT REPORTED     Culture NO GROWTH 9 HOURS     Culture       Performed at [facility], [address] [place] [address] [phone]    Status Pending    Culture Blood #1   Result Value Ref Range    Specimen Description       . BLOOD  1846 12ML OUTER RAC Performed at [facility]    Specimen Description        Laboratory [address] [address] [phone]    Special Requests NOT REPORTED     Culture NO GROWTH 9 HOURS     Culture       Performed at [facility], [address] [place] [address] [phone]    Status Pending    Culture Blood #1   Result Value Ref Range    Specimen Description       . BLOOD  18ML 1841 INNER RAC Performed at [facility]    Specimen Description        Laboratory [address] [address] [phone]    Special Requests NOT REPORTED     Culture NO GROWTH 9 HOURS     Culture       Performed at [facility] They voiced understanding of these instructions and did not have any further questions or complaints. CONSULTS:    None    CRITICAL CARE:     None    PROCEDURES:    None    FINAL IMPRESSION      1.  Acute on chronic congestive heart failure, unspecified congestive heart failure type [facility])          DISPOSITION/PLAN   DISPOSITION Admitted 01/23/2018 07:40:47 PM      Condition on Disposition    Improved    PATIENT REFERRED TO:  Tamara Santillan MD  [facility]  [address] [phone]            DISCHARGE MEDICATIONS:  Current Discharge Medication List          (Please note that portions of this note were completed with a voice recognition program.  Efforts were made to edit the dictations but occasionally words are mis-transcribed.)    Nicole Webb DO  Attending Emergency Physician       Nicole Webb DO  01/25/18 0696

## 2018-01-24 NOTE — PROGRESS NOTES
Cane  ADL Assistance: Independent  Homemaking Assistance: Needs assistance  Homemaking Responsibilities: No  Ambulation Assistance: Independent  Transfer Assistance: Independent  Active : Yes  Mode of Transportation: [employer]  Occupation: Retired  Objective     Observation/Palpation  Posture: Fair       Strength RLE  Comment: grossly 4/5  Strength LLE  Comment: grossly 4/5        Bed mobility  Supine to Sit: Stand by assistance  Sit to Supine: Stand by assistance  Transfers  Sit to Stand: Contact guard assistance  Stand to sit: Contact guard assistance  Ambulation  Ambulation?: Yes  More Ambulation?: Yes  Ambulation 1  Surface: level tile  Device: Single point cane  Ambulation 2  Surface - 2: level tile  Device 2: Rolling Walker  Assistance 2: Contact guard assistance  Quality of Gait 2: decreased balance, decreased spatial awareness of the walker running into doorway on the right   Distance: 50ft x 2      Balance  Posture: Good  Sitting - Static: Good  Sitting - Dynamic: Good  Standing - Static: Good  Standing - Dynamic: Good        Assessment   Body structures, Functions, Activity limitations: Decreased functional mobility ; Decreased ADL status; Decreased strength;Decreased safe awareness;Decreased endurance;Decreased balance  Prognosis: Good  Decision Making: Low Complexity  REQUIRES PT FOLLOW UP: Yes  Activity Tolerance  Activity Tolerance: Patient Tolerated treatment well     Discharge Recommendations:  Continue to assess pending progress, Home with Home health PT, Home independently      Plan   Plan  Times per day: Daily  Current Treatment Recommendations: Strengthening, Balance Training, Transfer Training, Functional Mobility Training, Endurance Training, Gait Training, Stair training, Patient/Caregiver Education & Training, Home Exercise Program, Equipment Evaluation, Education, & procurement  Safety Devices  Type of devices: Bed alarm in place, Call light within reach    G-Code  PT G-Codes  Functional Limitation: Mobility: Walking and moving around  Mobility: Walking and Moving Around Current Status (): At least 40 percent but less than 60 percent impaired, limited or restricted  Mobility: Walking and Moving Around Goal Status ():  At least 20 percent but less than 40 percent impaired, limited or restricted  OutComes Score                                           AM-PAC Score             Goals  Short term goals  Time Frame for Short term goals: 3days  Short term goal 1: bed moblity mod Independent  Short term goal 2: transfers with sba   Short term goal 3: amb x 100ft with RW and mod independent   Patient Goals   Patient goals : Return Home        Therapy Time   Individual Concurrent Group Co-treatment   Time In 1350         Time Out 1415         Minutes 25         Timed Code Treatment Minutes: 15 [facility], PT

## 2018-01-24 NOTE — CONSULTS
[facility]  In PatientCardiology Consult             Date:   1/24/2018  Patient name: Brit Arellano  Date of admission:  1/23/2018  4:58 PM  MRN:   [id number]  YOB: 1929      Date:   1/24/2018  Patient name: Brit Arellano  Date of admission:  1/23/2018  4:58 PM  MRN:   [id number]  YOB: 1929    Reason for Admission:  CHF    CHIEF COMPLAINT:  SOB and lower ext edema     History Obtained From:  Patient and medical records. HISTORY OF PRESENT ILLNESS:      The patient is a 80 y.o. male patient of Tylor Ibrahim MD who presents from the ER. He has dementia, so history is obtained from his wife, Shi Mcdaniel, who is present with him at this time. Sih Mcdaniel states Hans Patton has had generalized fatigue over the past 2 weeks, along with increased bilateral lower leg edema. She states he has been sleeping excessively and has had somewhat of a poor appetite. She denies any recent loss of consciousness, complaints of chest pain, or unilateral weakness. He has a history of systolic CHF and in ER his pro-BNP was 31,520. Head CT was negative for any acute process. His cardiologist, Dr. Toña Krishnamurthy [facility]), spoke with Dr. Minnie Tabor, hospitalist, earlier this evening, requesting admission here for a dobutamine infusion for his CHF. Hans Patton also has DMII, CKD, DONA with CPAP, dysphagia (honey thickened liquids recommended per [facility] 01/18/2018), a cardiac pacemaker, and is on Coumadin.      Stress test 08/2017 was positive for ischemia and inferior wall infarct with EF 22%. Shi Mcdaniel states Dr. Tobin Momin discontinued Jamil's entresto on 01/18/2018, but she is unsure why. Shi Mcdaniel also shared that Hans Patton had \"breathing problems\" when he was on beta blockers in the past requiring a hospitalization at the [facility]. She states he was on digoxin prior to the entresto.      Hans Patton currently denies any chest pain/pressure, palpitations, or dyspnea. He does c/o being tired.      Past Medical /69   Pulse 71   Temp 98.4 °F (36.9 °C) (Tympanic)   Resp 22   Ht 5' 9\" (1.753 m)   Wt 164 lb 6.4 oz (74.6 kg)   SpO2 95%   BMI 24.28 kg/m²    Constitutional and General Appearance: alert, cooperative, no distress and appears stated age  HEENT: PERRL, no cervical lymphadenopathy. No masses palpable. Normal oral mucosa  Respiratory:  · Normal excursion and expansion without use of accessory muscles  · Resp Auscultation: Good respiratory effort. No for increased work of breathing. On auscultation: Crackles to auscultation bilaterally   Cardiovascular:  · The apical impulse is not displaced  · Heart tones are crisp and normal. regular S1 and S2. Murmurs: 3/6 holosystolic murmur. · Jugular venous pulsation Normal  · The carotid upstroke is normal in amplitude and contour without delay or bruit  · Peripheral pulses are symmetrical and full   Abdomen:  · No masses or tenderness  · Bowel sounds present  Extremities:  ·  No Cyanosis or Clubbing  ·  Lower extremity edema: Yes. ·  Skin: Warm and dry      DATA:    Diagnostics:      EKG: Ventricular paced rhythm. Labs:     CBC:   Recent Labs      01/23/18   1708  01/24/18   0544   WBC  7.9  8.9   HGB  13.1*  13.2*   HCT  39.7*  39.3*   PLT  160  143     BMP:   Recent Labs      01/23/18   1708  01/24/18   0544   NA  136  136   K  4.9  4.8   CO2  21  18*   BUN  54*  52*   CREATININE  1.74*  1.40*   LABGLOM  37*  48*   GLUCOSE  139*  128*     BNP: No results for input(s): BNP in the last 72 hours.   PT/INR:   Recent Labs      01/24/18   0544   PROTIME  22.9*   INR  2.1     FASTING LIPID PANEL:  Lab Results   Component Value Date    HDL 32 01/24/2018    TRIG 52 01/24/2018       Patient Active Problem List   Diagnosis    Hip pain, acute    GERD (gastroesophageal reflux disease)    Hypertension    Hyperlipidemia    Anxiety    Atrial fibrillation    Sleep apnea    AAA (abdominal aortic aneurysm) without rupture (HCC)    Carotid stenosis    Acute on chronic congestive heart failure (HCC)    Type II or unspecified type diabetes mellitus without mention of complication, not stated as uncontrolled    Osteoarthritis    Left carpal tunnel syndrome    H/O falling    Head injury    Hearing impaired    Chronic right sacroiliac joint pain    Radiculopathy    Lumbar spinal stenosis    Long term current use of anticoagulant therapy    Gait abnormality    DM (diabetes mellitus), type 2 ([facility])    Non-ischemic cardiomyopathy ([facility] [address]    PAF (paroxysmal atrial fibrillation) ([facility])    Chest pain    NSTEMI (non-ST elevated myocardial infarction) ([facility] [address]    Coronary artery disease due to lipid rich plaque    Acute on chronic systolic CHF (congestive heart failure) ([facility])       IMPRESSION:      1. Acute on chronic systolic and diastolic CHF. 2. Elevated troponin due to cardiomyopathy and renal insufficiency not ACS. 3. Non-ischemic cardiomyopathy with EF of 25-30%. 4. S/P Bi-V ICD. 5. Chronic A.fib on anticoagulation. 6. History of HTN. 7. DM. 8. HLP  9. AAA. 10. CKD  11. Sleep apnea. 12. GERD  13. Dementia. RECOMMENDATIONS:    1. IV diuresis with Lasix, monitor renal function, electrolytes, Is&Os and daily weight. 2. Add ACEi when renal function improve. 3. Add low dose BB when he is compensated. 4. Continue anticoagulation with Coumadin. 5. No need for cardiac workup. Discussed with patient and nursing.     Electronically signed by Ky Duque MD on 1/24/2018 at 12:27 PM      [facility]  [phone]

## 2018-01-24 NOTE — PLAN OF CARE
Problem: Falls - Risk of  Goal: Absence of falls  Outcome: Ongoing      Problem: Fluid Volume:  Goal: Hemodynamic stability will improve  Hemodynamic stability will improve   Outcome: Ongoing    Goal: Ability to maintain a balanced intake and output will improve  Ability to maintain a balanced intake and output will improve   Outcome: Ongoing      Problem: Pain:  Goal: Pain level will decrease  Pain level will decrease   Outcome: Ongoing    Goal: Control of acute pain  Control of acute pain   Outcome: Ongoing    Goal: Control of chronic pain  Control of chronic pain   Outcome: Ongoing      Problem: OXYGENATION/RESPIRATORY FUNCTION  Goal: Patient will maintain patent airway  Outcome: Ongoing    Goal: Patient will achieve/maintain normal respiratory rate/effort  Respiratory rate and effort will be within normal limits for the patient  Outcome: Ongoing      Problem: HEMODYNAMIC STATUS  Goal: Patient has stable vital signs and fluid balance  Outcome: Ongoing      Problem: FLUID AND ELECTROLYTE IMBALANCE  Goal: Fluid and electrolyte balance are achieved/maintained  Outcome: Ongoing      Problem: ACTIVITY INTOLERANCE/IMPAIRED MOBILITY  Goal: Mobility/activity is maintained at optimum level for patient  Outcome: Ongoing

## 2018-01-24 NOTE — PROGRESS NOTES
Hospitalist Progress Note    Patient:  Radha Koch     YOB: 1929    MRN: [id number]   Admit date: 1/23/2018     Acct: [de-identified]     PCP: Reagan Wilson MD    CC--Interval History:   CHF---non-ischemic cardiomyoapthy---on dobutamine and Lasix---BP improved and good urine output---seen by Cardiology    Pacemaker--functional      Event of falls-slurred speech > 7 days ago----CT brain---old lacunar infarction--chronic ischemic changes    Dementia--appears to have progressed     See note below      All other ROS negative except noted in HPI    Diet:  Diet NPO Time Specified    Medications:  Scheduled Meds:   warfarin  2 mg Oral Once    vitamin C  500 mg Oral TID    atorvastatin  10 mg Oral Daily    vitamin D  1,000 Units Oral Daily    ferrous sulfate  325 mg Oral TID     PRESERVISION AREDS 2  1 capsule Oral BID    psyllium  0.52 g Oral Daily    sertraline  100 mg Oral Daily    tamsulosin  0.4 mg Oral Daily    sodium chloride flush  10 mL Intravenous 2 times per day    insulin lispro  0-6 Units Subcutaneous TID     insulin lispro  0-3 Units Subcutaneous Nightly    furosemide  20 mg Intravenous BID    [START ON 1/25/2018] warfarin  4 mg Oral Once per day on Tue Thu Sat    warfarin  2 mg Oral Once per day on Sun Mon Wed Fri    warfarin (COUMADIN) daily dosing (placeholder)   Other RX Placeholder     Continuous Infusions:   dextrose      DOBUTamine 2 mcg/kg/min (01/24/18 1123)     PRN Meds:sodium chloride flush, acetaminophen, magnesium hydroxide, ondansetron, glucose, dextrose, glucagon (rDNA), dextrose    Objective:  Labs:  CBC with Differential:    Lab Results   Component Value Date    WBC 8.9 01/24/2018    RBC 3.81 01/24/2018    HGB 13.2 01/24/2018    HCT 39.3 01/24/2018     01/24/2018    .1 01/24/2018    MCH 34.5 01/24/2018    MCHC 33.5 01/24/2018    RDW 16.4 01/24/2018    LYMPHOPCT 6 01/24/2018    MONOPCT 10 01/24/2018    BASOPCT 1 01/24/2018    MONOSABS 0.90 DOBUTAMINE gtt---1.23.2018                  CHFacute-on-chronic combined systolic-diastolic1.23.2018          EKG---1.23.2018---ventricular paced rhythm72biventricular pacemaker          CXR---1.23.2018---mild CHF     Acute combined systolic-diastolic heart failure---multiple events          Nuclear stress---8.10. 2017----mild citlali-infarct ischemia---                        moderate-to-large inferior wall infarction---abnormal wall                        motion---LVEF ~ 22%          2D ECHO---3. 4.2016--LVEF ~ 10%           Cf--7.2015--LVEF ~ 25-30%           Cf.---1.23.2015--RVSP ~ 29 mm Hg--severely dilated LA-LV--                              RA--no AS--LVEF ~ 24 % +/- 5%          Chronically elevated troponin   Non-ischemic cardiomyopathy                                                                                            Chest pain---7.20.2017elevated troponin---flat peak                                                                                         EKG7.20.2017paced rhythm                                                                         ASCVD              BENJA--2009          Cardiac catheterization2003normal coronary arteries                preserved LV functionLVEF ~ 60%              Similar results--c. 2009 by ER report                                                            Sick sinus syndromepacemaker placement2009                                                   Atrial fibrillation = underlying rhythm                Paroxysmalonset7.2010--prior EKGs                         NSRLADLAFB  Eventslurred speech---falls---c. 1 week PTA----1.23.2018          CT brain----1.23.2018---no MBS---chronic microvascular ischemic                           changes----old lacunar infarction left basal ganglia   Sleep apneaCPAP  Hypertension  Hyperlipidemia   Kidney disease--chronic--Stage 2  Peripheral vascular diseaseaortic dilatation stable----TJ-35-40%LICA-40-45%                       no significant stenosismoderate                      Diabetes Mellitus Type 2diabetic neuropathy   Dyspnea with exertion--restrictive lung disease--                       normal DLCO--since 2013  GERD   Dementia---short-term memory loss   Chronic pain            Spinal stenosis-chronic back pain             SI joint pain   Prostate carcinomaseed implantsincomplete              voiding    Anxiety   Hearing impairment  Tobacco abusequit1960s  PMH:  BCC, renal calculi1992, osteoarthritis,              mandible fracture, bowel obstructionno              surgery1980, erectile dysfunction, dyspnea on               exertion8.19.2009spiral CT8.19.2008--2009             no PE, DVT, or dissection, hyponatremia             severehypotonic hyponatremiaSIADH              [? Karlos   ?U OsM], 10.23.10C. DIFFICLE[+],             MI ruled out--2. 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 CHF---dobutamine---increased dose---cont'd IV diuretics  2. Daily weight---IO  3. Cardiology  4.   See orders    Electronically signed by Shorty Chinchilla on 1/24/2018 at 12:03 PM    Hospitalist

## 2018-01-24 NOTE — H&P
tablet, Take 325 mg by mouth 3 times daily (with meals)  furosemide (LASIX) 20 MG tablet, TAKE TWO TABLETS BY MOUTH ONCE DAILY  warfarin (COUMADIN) 4 MG tablet, TAKE ONE TABLET BY MOUTH ONCE DAILY  sacubitril-valsartan (ENTRESTO) 24-26 MG per tablet, Take 1 tablet by mouth 2 times daily  atorvastatin (LIPITOR) 10 MG tablet, TAKE 1 TABLET BY MOUTH ONE TIME A DAY   metFORMIN (GLUCOPHAGE) 500 MG tablet, TAKE 1 TABLET BY MOUTH THREE TIMES A DAY   warfarin (COUMADIN) 1 MG tablet, 2 mg Thurs, 4 mg all other days or as directed per INR  spironolactone (ALDACTONE) 25 MG tablet, TAKE ONE TABLET BY MOUTH ONCE DAILY  Ascorbic Acid (VITAMIN C) 500 MG tablet, Take 500 mg by mouth 3 times daily  Cholecalciferol (VITAMIN D3) 1000 UNITS TABS, Take 1,000 Units by mouth daily  Multiple Vitamins-Minerals (PRESERVISION AREDS 2) CAPS, Take 1 capsule by mouth 2 times daily  Psyllium (METAMUCIL PO), Take by mouth daily   CPAP Machine MISC, CPAP Machine. Use nightly  sertraline (ZOLOFT) 100 MG tablet, Take 100 mg by mouth daily. tamsulosin (FLOMAX) 0.4 MG capsule, Take 0.4 mg by mouth daily. Handicap Placard MISC, by Does not apply route . Expires in 3 years  nitroGLYCERIN (NITROSTAT) 0.4 MG SL tablet, Place 1 tablet under the tongue every 5 minutes as needed for Chest pain  warfarin (COUMADIN) 1 MG tablet, Take 1 mg by mouth daily Or as directed per INR    Allergies:    Bextra [valdecoxib]; Biaxin [clarithromycin]; and Vasotec [enalapril]    Social History:    reports that he quit smoking about 63 years ago. His smoking use included Cigarettes. He has a 4.00 pack-year smoking history. He has never used smokeless tobacco. He reports that he drinks alcohol. He reports that he does not use drugs. Family History:   family history includes Heart Attack in his father; Mult Sclerosis in his sister; Substance Abuse in his father.     REVIEW OF SYSTEMS:  See HPI and problem list; otherwise no other new complaints with respect to HEENT, neck, pulmonary, coronary, GI, , endocrine, musculoskeletal, immune system/connective tissue disease, hematologic, neuropsych, skin, lymphatics, or malignancies. Code status discussed with wifeJocelyne--wishes for Full Code at this time. PHYSICAL EXAM:  Vitals:  /61   Pulse 69   Temp 97 °F (36.1 °C) (Tympanic)   Resp 12   Ht 5' 9\" (1.753 m)   Wt 164 lb 8 oz (74.6 kg)   SpO2 100%   BMI 24.29 kg/m²     General: sleeping, awakens easily to verbal stimuli and carries on conversation, drifts back to sleep after minutes without stimuli, appears well groomed  HEENT: Normocephalic and Atraumatic  Neck: Supple, Carotid Pulses Present, No Masses, Tenderness, Nodularity and No Lymphadenopathy  Chest/Lungs: clear, diminished bilaterally  Cardiac: Regular Rate and Rhythm  GI/Abdomen:  Bowel Sounds Present, Soft, Non-tender, without Guarding or Rebound Tenderness and No Masses  : Not examined  EXT/Skin: 1-2+ pitting edema of bilateral feet and ankles, No Cyanosis and No Clubbing  Neuro: Dementia at baseline, No Localizing Signs/Symptoms, follows commands with all 4 extremities and Strength equal bilaterally, gait impairment at baseline (unsteady, ambulates with a cane at baseline per wife's report)      LABS:    CBC with Differential:    Lab Results   Component Value Date    WBC 7.9 01/23/2018    RBC 3.77 01/23/2018    HGB 13.1 01/23/2018    HCT 39.7 01/23/2018     01/23/2018    .2 01/23/2018    MCH 34.8 01/23/2018    MCHC 33.0 01/23/2018    RDW 17.0 01/23/2018    LYMPHOPCT 9 01/23/2018    MONOPCT 12 01/23/2018    BASOPCT 1 01/23/2018    MONOSABS 0.90 01/23/2018    LYMPHSABS 0.70 01/23/2018    EOSABS 0.10 01/23/2018    BASOSABS 0.10 01/23/2018    DIFFTYPE NOT REPORTED 01/23/2018     CMP:    Lab Results   Component Value Date     01/23/2018    K 4.9 01/23/2018    CL 98 01/23/2018    CO2 21 01/23/2018    BUN 54 01/23/2018    CREATININE 1.74 01/23/2018    GFRAA 45 01/23/2018    LABGLOM 37

## 2018-01-25 NOTE — PROGRESS NOTES
[place],  [address] [place] [address]; [facility];                   OR Cardiology; [facility] Schwarz, Cardiology--[facility]]                                                                                   DOBUTAMINE gtt---1.23.2018                  CHFacute-on-chronic combined systolic-diastolic1.23.2018          EKG---1.23.2018---ventricular paced rhythm72biventricular pacemaker          CXR---1.23.2018---mild CHF     Acute combined systolic-diastolic heart failure---multiple events          Nuclear stress---8.10. 2017----mild citlali-infarct ischemia---                        moderate-to-large inferior wall infarction---abnormal wall                        motion---LVEF ~ 22%          2D ECHO---3. 4.2016--LVEF ~ 10%           Cf--7.2015--LVEF ~ 25-30%           Cf.---1.23.2015--RVSP ~ 29 mm Hg--severely dilated LA-LV--                              RA--no AS--LVEF ~ 24 % +/- 5%          Chronically elevated troponin   Non-ischemic cardiomyopathy                                                                                            Chest pain---7.20.2017elevated troponin---flat peak                                                                                         EKG7.20.2017paced rhythm                                                                         ASCVD              BENJA--2009          Cardiac catheterization2003normal coronary arteries                preserved LV functionLVEF ~ 60%              Similar results--c. 2009 by ER report                                                            Sick sinus syndromepacemaker placement2009                                                   Atrial fibrillation = underlying rhythm                Paroxysmalonset7.2010--prior EKGs                         NSRLADLAFB  Eventslurred speech---falls---c. 1 week PTA----1.23.2018          CT brain----1.23.2018---no MBS---chronic microvascular ischemic                           changes----old lacunar infarction left basal ganglia   Sleep apneaCPAP  Hypertension  Hyperlipidemia   Kidney disease--chronic--Stage 3  Peripheral vascular diseaseaortic dilatation                      stable----TJ-35-40%LICA-40-45%                       no significant stenosismoderate                      Diabetes Mellitus Type 2diabetic neuropathy   Dyspnea with exertion--restrictive lung disease--                       normal DLCO--since 2013  GERD   Dementia---short-term memory loss   Chronic pain            Spinal stenosis-chronic back pain             SI joint pain   Prostate carcinomaseed implantsincomplete              voiding    Anxiety   Hearing impairment  Tobacco abusequit1960s  PMH:  BCC, renal calculi1992, osteoarthritis,              mandible fracture, bowel obstructionno              surgery1980, erectile dysfunction, dyspnea on               exertion8.19.2009spiral CT8.19.2008--2009             no PE, DVT, or dissection, hyponatremia             severehypotonic hyponatremiaSIADH              [? Karlos   ?U OsM], 10.23.10C. DIFFICLE[+],             MI ruled out--2. 5.2013, supratherapeutic INR--2013,              falls, carpal tunnel syndrome, Mitchell esophagus,           PSH:   see above, cholecsystectomy1966, pilonidal               cystectomy, left SQS9689, hyponatremia              excision scalp epidermal inclusion BKDG3613,               right NITIN2003, CTS release, EGD--2010--               Mitchells esophagus   Allergies:        Biaxin                                Intolerances:  Bextraconfusion,  Vasoteccough         Plan:  1. CHF---dobutamine drip cont'd--IV diuretics---daily weight---IO  2. DONA---CPAP encouraged  3.    See orders     Electronically signed by Stephanie Mccormick on 1/25/2018 at 8:34 AM    Hospitalist

## 2018-01-25 NOTE — PROGRESS NOTES
conversation, drifts back to sleep after minutes without stimuli, appears well groomed  HEENT: Normocephalic and Atraumatic  Neck: Supple, Carotid Pulses Present, No Masses, Tenderness, Nodularity and No Lymphadenopathy  Chest/Lungs: clear, diminished bilaterally  Cardiac: Regular Rate and Rhythm, STELLA  GI/Abdomen: Bowel Sounds Present, Soft, Non-tender, without Guarding or Rebound Tenderness and No Masses  : Not examined  EXT/Skin: 1+ pitting edema of bilateral feet and ankles, No Cyanosis and No Clubbing  Neuro: Dementia at baseline, No Localizing Signs/Symptoms, follows commands with all 4 extremities and Strength equal bilaterally, gait impairment at baseline (unsteady, ambulates with a cane at baseline per wife's report)        Assessment:    Active Problems:    Acute on chronic systolic CHF (congestive heart failure) ([facility] [address]        Plan:  1. CHF--dobutamine gtt--IV diuresis--daily weight--I&Os  2. DMII-stable  3. CKD--monitor  4.  See orders    Electronically signed by Luis BILL, FNP-BC on 1/24/2018 at 9:21 PM    Hospitalist

## 2018-01-25 NOTE — FLOWSHEET NOTE
No spiritual concerns, just conversation about family.      01/25/18 1322   Encounter Summary   Services provided to: Patient   Referral/Consult From: Rounding   Complexity of Encounter Low   Length of Encounter 15 minutes   Routine   Type Initial   Assessment Approachable   Intervention Sustaining presence/ Ministry of presence   Outcome Engaged in conversation

## 2018-01-25 NOTE — PROGRESS NOTES
assistance  Lateral Transfers: Stand by assistance  Ambulation  Ambulation?: Yes  More Ambulation?: Yes  Ambulation 1  Surface: level tile  Device: Rolling Walker  Assistance: Contact guard assistance;Stand by assistance  Quality of Gait: able to tolerate increased distance this date   Distance: 50' x 4     Balance  Posture: Good  Sitting - Static: Good  Sitting - Dynamic: Good  Standing - Static: Good  Standing - Dynamic: Good  Exercises  Straight Leg Raise: 15x each  Gluteal Sets: 3\" x 15  Hip Flexion: 15x  Hip Abduction: 15x  Knee Long Arc Quad: 15x  Ankle Pumps: 20x   PROM RLE (degrees)  RLE PROM: WNL  AROM RLE (degrees)  RLE AROM: WNL  PROM LLE (degrees)  LLE PROM: WNL  AROM LLE (degrees)  LLE AROM : WNL  Strength RLE  Comment: grossly 4/5  Strength LLE  Comment: grossly 4/5                 Assessment   Body structures, Functions, Activity limitations: Decreased functional mobility ; Decreased ADL status; Decreased strength;Decreased safe awareness;Decreased endurance;Decreased balance  Prognosis: Good  Decision Making: Low Complexity  REQUIRES PT FOLLOW UP: Yes  Activity Tolerance  Activity Tolerance: Patient Tolerated treatment well       Discharge Recommendations:  Continue to assess pending progress, Home with Home health PT, Home independently    G-Code     OutComes Score                                                    AM-PAC Score             Goals  Short term goals  Time Frame for Short term goals: 3days  Short term goal 1: bed moblity mod Independent  Short term goal 2: transfers with sba   Short term goal 3: amb x 100ft with RW and mod independent   Patient Goals   Patient goals : Return Home     Plan    Plan  Times per day: Daily  Current Treatment Recommendations: Strengthening, Balance Training, Transfer Training, Functional Mobility Training, Endurance Training, Gait Training, Stair training, Patient/Caregiver Education & Training, Home Exercise Program, Equipment Evaluation, Education, &

## 2018-01-26 NOTE — DISCHARGE INSTR - DIET

## 2018-01-26 NOTE — PROGRESS NOTES
Training, Gait Training, Stair training, Patient/Caregiver Education & Training, Home Exercise Program, Equipment Evaluation, Education, & procurement  Safety Devices  Type of devices: Call light within reach, Chair alarm in place, Gait belt, Left in chair, Nurse notified     Therapy Time   Individual Concurrent Group Co-treatment   Time In [phone]         Time Out 0809         Minutes 1 [place], [place]

## 2018-01-26 NOTE — PROGRESS NOTES
up Dr. Shara Matthews, Cardiology  9.   See orders     Electronically signed by Nayely Evans on 1/26/2018 at [facility] PM    Hospitalist

## 2018-01-26 NOTE — PROGRESS NOTES
Jesus Brennan at the [address] [facility] called for report. Spouse called as well. No further questions at this time - phone number left.

## 2018-01-26 NOTE — PLAN OF CARE
Problem: Falls - Risk of  Goal: Absence of falls  Outcome: Ongoing      Problem: Fluid Volume:  Goal: Hemodynamic stability will improve  Hemodynamic stability will improve   Outcome: Ongoing    Goal: Ability to maintain a balanced intake and output will improve  Ability to maintain a balanced intake and output will improve   Outcome: Ongoing      Problem: Pain:  Goal: Pain level will decrease  Pain level will decrease   Outcome: Ongoing    Goal: Control of acute pain  Control of acute pain   Outcome: Ongoing    Goal: Control of chronic pain  Control of chronic pain   Outcome: Ongoing      Problem: OXYGENATION/RESPIRATORY FUNCTION  Goal: Patient will maintain patent airway  Outcome: Ongoing    Goal: Patient will achieve/maintain normal respiratory rate/effort  Respiratory rate and effort will be within normal limits for the patient   Outcome: Ongoing      Problem: HEMODYNAMIC STATUS  Goal: Patient has stable vital signs and fluid balance  Outcome: Ongoing      Problem: FLUID AND ELECTROLYTE IMBALANCE  Goal: Fluid and electrolyte balance are achieved/maintained  Outcome: Ongoing

## 2018-01-26 NOTE — PROGRESS NOTES
Hospitalist Progress Note    Patient:  Izzy Alvarez     YOB: 1929    MRN: [id number]   Admit date: 1/23/2018     Acct: [de-identified]     PCP: Bre Hutton MD    CC--Interval History: CHF-improving--con't IV dobutamine and diuresis---denies chest pain/pressure or dyspnea    All other ROS negative except noted in HPI    Diet:  DIET CARDIAC; Carb Control: 5 carbs/meal (approximate 2000 kcals/day);  Honey Thick; Daily Fluid Restriction: 1500 ml    Medications:  Scheduled Meds:   vitamin C  500 mg Oral TID    atorvastatin  10 mg Oral Daily    vitamin D  1,000 Units Oral Daily    ferrous sulfate  325 mg Oral TID WC    PRESERVISION AREDS 2  1 capsule Oral BID    psyllium  0.52 g Oral Daily    sertraline  100 mg Oral Daily    tamsulosin  0.4 mg Oral Daily    sodium chloride flush  10 mL Intravenous 2 times per day    insulin lispro  0-6 Units Subcutaneous TID WC    insulin lispro  0-3 Units Subcutaneous Nightly    furosemide  20 mg Intravenous BID    warfarin (COUMADIN) daily dosing (placeholder)   Other RX Placeholder     Continuous Infusions:   dextrose      DOBUTamine 2 mcg/kg/min (01/25/18 0602)     PRN Meds:sodium chloride flush, acetaminophen, magnesium hydroxide, ondansetron, glucose, dextrose, glucagon (rDNA), dextrose    Objective:  Pertinent Labs:  Creatinine 1.40-->1.29  Hgb 13.2-->12.5      Physical Exam:  Vitals: BP (!) 92/54   Pulse 64   Temp 97.4 °F (36.3 °C) (Tympanic)   Resp 16   Ht 5' 9\" (1.753 m)   Wt 160 lb (72.6 kg)   SpO2 95%   BMI 23.63 kg/m²   24 hour intake/output:    Intake/Output Summary (Last 24 hours) at 01/25/18 2031  Last data filed at 01/25/18 1936   Gross per 24 hour   Intake          1836.01 ml   Output             2325 ml   Net          -488.99 ml       General: awake, alert, cooperative, forgetful  HEENT: Normocephalic and Atraumatic  Neck: Supple, Carotid Pulses Present, No Masses, Tenderness, Nodularity and No Lymphadenopathy  Chest/Lungs: clear, diminished bilaterally  Cardiac: Regular Rate and Rhythm, STELLA  GI/Abdomen: Bowel Sounds Present, Soft, Non-tender, without Guarding or Rebound Tenderness and No Masses  : Not examined  EXT/Skin: 1+ pitting edema of bilateral feet and ankles, No Cyanosis and No Clubbing  Neuro: Dementia at baseline, No Localizing Signs/Symptoms, follows commands with all 4 extremities and Strength equal bilaterally, generalized weakness, gait impairment at baseline (unsteady, ambulating with a walker)        Assessment:    Active Problems:    Acute on chronic systolic CHF (congestive heart failure) ([facility] [address]        Plan:  1. CHF-improving--con't dobutamine gtt--IV diuresis--daily weight--I&Os  2. DMII-stable-monitor  3. CKD-improved-monitor  4. DONA--home cpap--needs reinforced  5.  See orders    Electronically signed by Luis Carlos HERRERAC, FNP-BC on 1/25/2018 at 8:31 PM    Hospitalist

## 2018-01-26 NOTE — PROGRESS NOTES
Nutrition Assessment    Type and Reason for Visit: Reassess      Nutrition Diagnosis:   · Problem: No nutrition diagnosis at this time    Nutrition Assessment:  · Subjective Assessment: Pt. has been eating over 75% of trays since being off NPO and seems to be tolerating well. Will give him ONS once per day, but pt is not currently at risk for malnutrition. Will continue to follow. Nutrition Risk Level   Risk Level: Low    Nutrition Intervention  Food and/or Delivery: Start ONS, Continue current diet    Patient assessed for nutrition risk. Deemed to be at low risk at this time. Will continue to follow patient.       Electronically signed by Roger Pantoja on 1/26/18 at 8:57 AM    Contact Number: [phone]

## 2018-01-27 NOTE — DISCHARGE SUMMARY
aspect of the patient's care not discussed in the chart and/or  dictation will be addressed and treated as an outpatient. The patient's medications have been reviewed including, but not limited to,  pre-hospital, hospital and discharge medications. The patient and/or the  patient's personal representatives were specifically advised the only  medications to be taken are those set forth in the discharge orders and no  other medications should be taken. Any prior medications not on the  discharge orders are specifically discontinued. The patient has been advised of the financial relationship and ownership  interests between [address] [place] [address] physicians and employees  of [facility] and [address] [place] [address].         Jessica Gonsalez    D: 01/26/2018 15:59:48       T: 01/27/2018 4:54:49     [email or web address]  Job#: [id number]     Doc#: [id number]    CC:

## 2018-01-29 NOTE — PROGRESS NOTES
Patient is currently on 4 mg on Tues, Thurs, Sat and 2 mg all other days. No other changes to medications or diet.

## 2018-01-29 NOTE — PROGRESS NOTES
ANTICOAGULATION SERVICE    Date of Clinic Visit:  1/29/2018    Yoselin Stewart is a 80 y.o. male who was recently discharged from the hospital. Coumadin clinic was to follow but Dr Sahil Narvaez is now following while at [address] [facility]. Recent INR Results:  Internal QC passed  Lab Results   Component Value Date    INR 3.5 01/29/2018    INR 2.8 01/26/2018       Current Warfarin Dosage:  Dosing Plan  As of 1/29/2018    TTR:   77.8 % (4.4 y)   Full instructions:   1/29: Hold; Otherwise 4 mg on Tue, Thu, Sat; 2 mg all other days             Assessment/Plan:    Continue current regimen as INR remains stable. OR   Modify warfarin dose as noted above:     Next Clinic Appointment:  Return date  As of 1/29/2018    TTR:   77.8 % (4.4 y)   Next INR check:                Please call [facility] at [phone] with any questions. Thanks!   Jana Valdez [facility]  Anticoagulation Service Pharmacist  1/29/2018 3:29 PM

## 2018-02-19 PROBLEM — C61 PROSTATE CANCER (HCC): Status: ACTIVE | Noted: 2018-01-01

## 2018-02-19 PROBLEM — I50.43 CHF (CONGESTIVE HEART FAILURE), NYHA CLASS I, ACUTE ON CHRONIC, COMBINED (HCC): Status: ACTIVE | Noted: 2018-01-01

## 2018-02-19 NOTE — PROGRESS NOTES
Patient transferred to [facility] ER by wheelchair. Patient's spouse also present at time of transfer.

## 2018-02-19 NOTE — ED PROVIDER NOTES
Hematological: Negative for adenopathy. Does not bruise/bleed easily. Psychiatric/Behavioral: Negative for confusion and suicidal ideas. Hallucinations: .phyexambyage. PAST MEDICAL HISTORY    has a past medical history of Anxiety; Atrial fibrillation ([address] Mitchell esophagus; CHF (congestive heart failure) (HCC); CKD (chronic kidney disease); CTS (carpal tunnel syndrome); Depression; GERD (gastroesophageal reflux disease); H/O falling; Head injury; Hearing impaired; Hyperlipidemia; Hypertension; Hyponatremia; Jaw fracture ([address] Osteoarthritis; Peripheral neuropathy ([address] Peripheral vascular disease ([address] Prostate cancer ([address] Sick sinus syndrome ([address] SOB (shortness of breath); Spinal stenosis; Type II or unspecified type diabetes mellitus without mention of complication, not stated as uncontrolled; and Unspecified sleep apnea. SURGICAL HISTORY      has a past surgical history that includes Total hip arthroplasty (Right, 2004); Total hip arthroplasty (Left, 1984); Cholecystectomy; Cardiac catheterization; pacemaker placement (2010); Carpal tunnel release (Left, May 2015); other surgical history; Pilonidal cyst excision; cyst removal; Colonoscopy (Dec. 2010); Upper gastrointestinal endoscopy (Dec. 2010); transesophageal echocardiogram (Aug. 2009); and pacemaker placement. CURRENT MEDICATIONS       Previous Medications    ACETAMINOPHEN (TYLENOL) 325 MG TABLET    Take 2 tablets by mouth every 4 hours as needed for Pain or Fever    ASCORBIC ACID (VITAMIN C) 500 MG TABLET    Take 500 mg by mouth 3 times daily    ATORVASTATIN (LIPITOR) 10 MG TABLET    TAKE 1 TABLET BY MOUTH ONE TIME A DAY     CHOLECALCIFEROL (VITAMIN D3) 1000 UNITS TABS    Take 1,000 Units by mouth daily    CPAP MACHINE MISC    CPAP Machine. Use nightly    DICLOFENAC SODIUM (VOLTAREN) 1 % GEL    Apply 2 g topically 2 times daily    FERROUS SULFATE 325 (65 FE) MG TABLET    Take 325 mg by mouth 3 times daily (with meals)    FUROSEMIDE URINALYSIS   LACTIC ACID   MICROSCOPIC URINALYSIS       Chronically elevated troponin    EMERGENCY DEPARTMENT COURSE:   Vitals:    Vitals:    02/19/18 1553 02/19/18 1703   BP: 93/60 113/70   Pulse: 60 66   Resp: 16 12   Temp: 97.7 °F (36.5 °C)    TempSrc: Tympanic    SpO2:  97%   Weight: 167 lb (75.8 kg)    Height: 5' 9\" (1.753 m)      -------------------------  BP: 113/70, Temp: 97.7 °F (36.5 °C), Pulse: 66, Resp: 12        Re-evaluation Notes        CRITICAL CARE:   IP CONSULT TO HOSPITALIST        CONSULTS:      PROCEDURES:  None    FINAL IMPRESSION      1. Acute on chronic congestive heart failure, unspecified congestive heart failure type [facility])          DISPOSITION/PLAN   DISPOSITION Admitted    Condition on Disposition    Stable    PATIENT REFERRED TO:  David Ellison MD  [facility]  [address] [address] [phone]            DISCHARGE MEDICATIONS:  New Prescriptions    No medications on file       (Please note that portions of this note were completed with a voice recognition program.  Efforts were made to edit the dictations but occasionally words are mis-transcribed.)    Woo MD, F.A.A.E.M.   Attending Emergency Physician                            Denia Hernandez MD  02/19/18 9834

## 2018-02-20 NOTE — PROGRESS NOTES
Physical Therapy    Facility/Department: [facility]  PROGRESSIVE CARE  Initial Assessment    NAME: Brit Arellano  : 7/3/1929  MRN: [id number]    Date of Service: 2018    Patient Diagnosis(es): The encounter diagnosis was Acute on chronic congestive heart failure, unspecified congestive heart failure type ([address] has a past medical history of Anxiety; Atrial fibrillation ([address] Mitchell esophagus; CHF (congestive heart failure) (HCC); CKD (chronic kidney disease); CTS (carpal tunnel syndrome); Depression; GERD (gastroesophageal reflux disease); H/O falling; Head injury; Hearing impaired; Hyperlipidemia; Hypertension; Hyponatremia; Jaw fracture ([address] Osteoarthritis; Peripheral neuropathy ([address] Peripheral vascular disease ([address] Prostate cancer ([address] Sick sinus syndrome ([address] SOB (shortness of breath); Spinal stenosis; Type II or unspecified type diabetes mellitus without mention of complication, not stated as uncontrolled; and Unspecified sleep apnea. has a past surgical history that includes Total hip arthroplasty (Right, ); Total hip arthroplasty (Left, ); Cholecystectomy; Cardiac catheterization; pacemaker placement (); Carpal tunnel release (Left, May 2015); other surgical history; Pilonidal cyst excision; cyst removal; Colonoscopy (Dec. 2010); Upper gastrointestinal endoscopy (Dec. 2010); transesophageal echocardiogram (Aug. 2009); and pacemaker placement.     Restrictions  Restrictions/Precautions  Restrictions/Precautions: Fall Risk, General Precautions  Required Braces or Orthoses?: Yes  Implants present? : Pacemaker, Metal implants (BTHA)  Vision/Hearing        Subjective  General  Chart Reviewed: Yes  Patient assessed for rehabilitation services?: Yes  Pain Screening  Patient Currently in Pain: Denies  Pain Assessment  Pain Assessment: 0-10  Pain Level: 0  Vital Signs  Patient Currently in Pain: Denies       Orientation  Orientation  Overall Orientation Status: Within Functional treatment well     Discharge Recommendations:  Continue to assess pending progress, ECF with PT, Home with Home health PT      Plan   Plan  Times per day: Daily  Current Treatment Recommendations: Strengthening, Balance Training, Endurance Training, Gait Training, Neuromuscular Re-education, Home Exercise Program  Safety Devices  Type of devices: Left in chair, Call light within reach, Nurse notified    G-Code  PT G-Codes  Functional Limitation: Mobility: Walking and moving around  Mobility: Walking and Moving Around Current Status (): At least 20 percent but less than 40 percent impaired, limited or restricted  Mobility: Walking and Moving Around Goal Status ():  At least 1 percent but less than 20 percent impaired, limited or restricted  OutComes Score                                           AM-PAC Score             Goals  Short term goals  Time Frame for Short term goals: 3days  Short term goal 1: Transfers with mod independent   Short term goal 2: Bed mobility with SBA   Short term goal 3: Amb x 50ft with RW and Mod independent  Patient Goals   Patient goals : Return Home        Therapy Time   Individual Concurrent Group Co-treatment   Time In 0125         Time Out 0145         Minutes 20         Timed Code Treatment Minutes: [address], PT

## 2018-02-20 NOTE — PROGRESS NOTES
Occupational Therapy   Occupational Therapy Initial Assessment  Date: 2018   Patient Name: Flash Salazar  MRN: [id number]     : 7/3/1929    Patient Diagnosis(es): The encounter diagnosis was Acute on chronic congestive heart failure, unspecified congestive heart failure type ([address] has a past medical history of Anxiety; Atrial fibrillation ([address] Mitchell esophagus; CHF (congestive heart failure) (HCC); CKD (chronic kidney disease); CTS (carpal tunnel syndrome); Depression; GERD (gastroesophageal reflux disease); H/O falling; Head injury; Hearing impaired; Hyperlipidemia; Hypertension; Hyponatremia; Jaw fracture ([address] Osteoarthritis; Peripheral neuropathy ([address] Peripheral vascular disease ([address] Prostate cancer ([address] Sick sinus syndrome ([address] SOB (shortness of breath); Spinal stenosis; Type II or unspecified type diabetes mellitus without mention of complication, not stated as uncontrolled; and Unspecified sleep apnea. has a past surgical history that includes Total hip arthroplasty (Right, ); Total hip arthroplasty (Left, ); Cholecystectomy; Cardiac catheterization; pacemaker placement (); Carpal tunnel release (Left, May 2015); other surgical history; Pilonidal cyst excision; cyst removal; Colonoscopy (Dec. 2010); Upper gastrointestinal endoscopy (Dec. 2010); transesophageal echocardiogram (Aug. 2009); and pacemaker placement.     Treatment Diagnosis: weakness      Restrictions  Restrictions/Precautions  Restrictions/Precautions: Fall Risk, General Precautions  Required Braces or Orthoses?: Yes  Implants present? : Pacemaker, Metal implants (BTHA)    Subjective   General  Chart Reviewed: Yes  Patient assessed for rehabilitation services?: Yes  Family / Caregiver Present: Yes  Referring Practitioner: RANDAL Francisco  Diagnosis: CHF  Subjective  Subjective: Patient rec'd in chair, pleasant and cooperative 80 yr old male with In LE edema  Pain Assessment  Patient Currently in Pain: Denies  Pain

## 2018-02-20 NOTE — PROGRESS NOTES
RT to set up patient's home CPAP/BIPAP. Patient states there is an exposed wire on his machine and that he couldn't wear it. Upon examining machine, it is missing outlet plug. Patient notified. Looked through entire bag and bag containing the mask. Patient states \"[place]\". Patient offered to use hospital supplied CPAP/BiPAP, patient declined.
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
## 2018-02-20 NOTE — FLOWSHEET NOTE
02/20/18 1415   Encounter Summary   Services provided to: Patient   Referral/Consult From: Rounding   Complexity of Encounter Low   Length of Encounter 15 minutes   Routine   Type Initial   Assessment Approachable   Intervention Sustaining presence/ Ministry of presence   Outcome Engaged in conversation
3 hours for 24 hours;   then every 4 hours for 24 hours. Document in Doc Flowsheets. Neurological Assessment Protocol initiated   no    If the patient did not hit his/her head during this fall, monitor vital signs every 8 hours. Notify the physician within 24 hours and document. Physician Notification  Please document under Provider Notification group within the Assessment (Complex Assessment) template of Doc Flowsheets.      Physician notified yes    Pharmacy notified no    House Supervisor/Clinical Manager Notified:   Serafin Osman  Date:   02/20/18 Time:   0  Family Member Notified:   RN to notify wife when arrives to unit    Date:   02/20/18 Time:   TBA

## 2018-02-20 NOTE — PROGRESS NOTES
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--on dobutamine and IV diuretics    Had recently been in ECF---has been out about a week and has already failed outpatient therapy    troponins chronically elevated    See note below     All other ROS negative except noted in HPI    Diet:  DIET CARDIAC; Carb Control: 5 carbs/meal (approximate 2000 kcals/day);  Honey Thick; Daily Fluid Restriction: 1500 ml    Medications:  Scheduled Meds:   furosemide  40 mg Intravenous BID    sodium chloride flush  10 mL Intravenous 2 times per day    atorvastatin  10 mg Oral Daily    ferrous sulfate  325 mg Oral TID WC    PRESERVISION AREDS 2  1 capsule Oral BID    sacubitril-valsartan  1 tablet Oral BID    sertraline  100 mg Oral Daily    spironolactone  25 mg Oral Daily    tamsulosin  0.4 mg Oral Dinner    warfarin (COUMADIN) daily dosing (placeholder)   Other RX Placeholder    warfarin  4 mg Oral Once per day on Mon Wed Fri    warfarin  2 mg Oral Once per day on Sun Tue Thu Sat     Continuous Infusions:   DOBUTamine 2 mcg/kg/min (02/19/18 2110)     PRN Meds:sodium chloride flush, diclofenac sodium, acetaminophen, magnesium hydroxide, ondansetron    Objective:  Labs:  CBC with Differential:    Lab Results   Component Value Date    WBC 7.4 02/20/2018    RBC 3.48 02/20/2018    HGB 11.9 02/20/2018    HCT 35.9 02/20/2018     02/20/2018    .0 02/20/2018    MCH 34.2 02/20/2018    MCHC 33.2 02/20/2018    RDW 15.5 02/20/2018    LYMPHOPCT 9 02/20/2018    MONOPCT 13 02/20/2018    BASOPCT 1 02/20/2018    MONOSABS 1.00 02/20/2018    LYMPHSABS 0.70 02/20/2018    EOSABS 0.10 02/20/2018    BASOSABS 0.10 02/20/2018    DIFFTYPE NOT REPORTED 02/20/2018     BMP:    Lab Results   Component Value Date     DOBUTAMINE 2 micrograms/kg/min  gtt---2.19.2018                  CHFacute-on-chronic combined systolic-diastolic2.19.2018      LLY---6.10. 2018---pulmonary vascular congestion---small                  pleural effusions      CXR---2.19.2018---CHFatelectasis---bibasilar opacities      EKG---2.19.2018---ventricular paced rhythm--70                  EKG---1.23.2018---ventricular paced rhythm72biventricular pacemaker                  CXR---1.23.2018---mild CHF     Acute combined systolic-diastolic heart failure---multiple events          Nuclear stress---8.10. 2017----mild citlali-infarct ischemia---                        moderate-to-large inferior wall infarction---abnormal wall                        motion---LVEF ~ 22%          2D ECHO---3. 4.2016--LVEF ~ 10%           Cf--7.2015--LVEF ~ 25-30%           Cf.---1.23.2015--RVSP ~ 29 mm Hg--severely dilated LA-LV--                              RA--no AS--LVEF ~ 24 % +/- 5%          Chronically elevated troponin   Non-ischemic cardiomyopathy                                                                                            Chest pain---7.20.2017elevated troponin---flat peak                                                                                         EKG7.20.2017paced rhythm                                                                         ASCVD              BENJA--2009          Cardiac catheterization2003normal coronary arteries                preserved LV functionLVEF ~ 60%              Similar results--c. 2009 by ER report                                                            Sick sinus syndromepacemaker placement2009                                                   Atrial fibrillation = underlying rhythm                Paroxysmalonset7.2010--prior EKGs                         NSRLADLAFB  Fall at [facility] brain---1.9.2018----no [facility]           CT on 2/20/2018 at 11:44 AM    Hospitalist

## 2018-02-20 NOTE — H&P
CAPS, Take 1 capsule by mouth 2 times daily  CPAP Machine MISC, CPAP Machine. Use nightly  sertraline (ZOLOFT) 100 MG tablet, Take 100 mg by mouth daily. tamsulosin (FLOMAX) 0.4 MG capsule, Take 0.4 mg by mouth daily. warfarin (COUMADIN) 4 MG tablet, Take 1 tablet by mouth daily As directed per INR  acetaminophen (TYLENOL) 325 MG tablet, Take 2 tablets by mouth every 4 hours as needed for Pain or Fever  warfarin (COUMADIN) 1 MG tablet, Pt to take 2mg today 01/26/2018 and Sunday 01/28/2018  Take 4mg on 01/27/2018  INR on 01/29/2018  Handicap Placard MISC, by Does not apply route . Expires in 3 years  nitroGLYCERIN (NITROSTAT) 0.4 MG SL tablet, Place 1 tablet under the tongue every 5 minutes as needed for Chest pain  Cholecalciferol (VITAMIN D3) 1000 UNITS TABS, Take 1,000 Units by mouth daily  Psyllium (METAMUCIL PO), Take by mouth daily     Allergies:    Bextra [valdecoxib]; Biaxin [clarithromycin]; and Vasotec [enalapril]    Social History:    reports that he quit smoking about 63 years ago. His smoking use included Cigarettes. He has a 4.00 pack-year smoking history. He has never used smokeless tobacco. He reports that he drinks alcohol. He reports that he does not use drugs. Family History:   family history includes Heart Attack in his father; Mult Sclerosis in his sister; Substance Abuse in his father. REVIEW OF SYSTEMS:  See HPI and problem list; otherwise no other new complaints with respect to HEENT, neck, pulmonary, coronary, GI, , endocrine, musculoskeletal, immune system/connective tissue disease, hematologic, neuropsych, skin, lymphatics, or malignancies. Code status discussed with patient/family--wishes for Full Code at this time.     PHYSICAL EXAM:  Vitals:  /68   Pulse 71   Temp 97 °F (36.1 °C) (Tympanic)   Resp 16   Ht 5' 9\" (1.753 m)   Wt 166 lb 10.7 oz (75.6 kg)   SpO2 98%   BMI 24.61 kg/m²     General: awake, alert and cooperative, forgetful  HEENT: [place], area of ecchymosis on occiput and Normocephalic  Neck: Supple, Carotid Pulses Present, No Masses, Tenderness, Nodularity and No Lymphadenopathy  Chest/Lungs: rales in bases bilaterally  Cardiac: Regular Rate and Rhythm, STELLA  GI/Abdomen:  Bowel Sounds Present, Soft, Non-tender, without Guarding or Rebound Tenderness and No Masses  : Not examined  EXT/Skin: 2+ pitting edema BLE up to below the knees, No Cyanosis and No Clubbing  Neuro: generalized weakness, ambulates with walker at baseline (unsteady), Dementia at baseline and No Localizing Signs/Symptoms      LABS:    CBC:   Lab Results   Component Value Date    WBC 5.8 02/19/2018    RBC 3.35 02/19/2018    HGB 11.5 02/19/2018    HCT 34.7 02/19/2018    .6 02/19/2018    MCH 34.4 02/19/2018    MCHC 33.2 02/19/2018    RDW 15.3 02/19/2018     02/19/2018    MPV 7.8 02/19/2018     CMP:    Lab Results   Component Value Date     02/19/2018    K 4.5 02/19/2018    CL 99 02/19/2018    CO2 26 02/19/2018    BUN 46 02/19/2018    CREATININE 1.58 02/19/2018    GFRAA 50 02/19/2018    LABGLOM 42 02/19/2018    GLUCOSE 100 02/19/2018    PROT 7.9 05/09/2017    LABALBU 4.4 05/09/2017    CALCIUM 9.3 02/19/2018    BILITOT 1.67 05/09/2017    ALKPHOS 64 05/09/2017    AST 23 05/09/2017    ALT 13 05/09/2017       ASSESSMENT:      Patient Active Problem List   Diagnosis    Hip pain, acute    GERD (gastroesophageal reflux disease)    Hypertension    Hyperlipidemia    Anxiety    Atrial fibrillation    Sleep apnea    AAA (abdominal aortic aneurysm) without rupture (HCC)    Carotid stenosis    Acute on chronic congestive heart failure (HCC)    Type II or unspecified type diabetes mellitus without mention of complication, not stated as uncontrolled    Osteoarthritis    Left carpal tunnel syndrome    H/O falling    Head injury    Hearing impaired    Chronic right sacroiliac joint pain    Radiculopathy    Lumbar spinal stenosis    Long term current use of anticoagulant therapy   

## 2018-02-21 NOTE — PROGRESS NOTES
Physical Therapy  Facility/Department: [facility]  PROGRESSIVE CARE  Daily Treatment Note  NAME: Josphine Mohs  : 7/3/1929  MRN: [id number]    Date of Service: 2018    Patient Diagnosis(es):   Patient Active Problem List    Diagnosis Date Noted    Prostate cancer ([facility] 2018    CHF (congestive heart failure), NYHA class I, acute on chronic, combined ([facility] 2018    Acute on chronic systolic CHF (congestive heart failure) ([facility] 2018    Coronary artery disease due to lipid rich plaque 2017    Chest pain 2017    NSTEMI (non-ST elevated myocardial infarction) ([facility]     Non-ischemic cardiomyopathy ([facility] 2016    PAF (paroxysmal atrial fibrillation) ([facility] 2016    DM (diabetes mellitus), type 2 ([facility] 2016    Gait abnormality 2015    Long term current use of anticoagulant therapy 2015    Chronic right sacroiliac joint pain 2015    Radiculopathy 2015    Lumbar spinal stenosis 2015    H/O falling     Head injury     Hearing impaired     Left carpal tunnel syndrome 2014    Osteoarthritis     [facility]     Type II or unspecified type diabetes mellitus without mention of complication, not stated as uncontrolled     AAA (abdominal aortic aneurysm) without rupture ([facility] 2013    Carotid stenosis 2013    GERD (gastroesophageal reflux disease)     Hypertension     Hyperlipidemia     Anxiety     Atrial fibrillation     Sleep apnea     Hip pain, acute 2013       Past Medical History:   Diagnosis Date    Anxiety     Atrial fibrillation ([facility]     pacemaker placed    Mitchell esophagus     CHF (congestive heart failure) (HCC)     CKD (chronic kidney disease)     CTS (carpal tunnel syndrome)     Depression     GERD (gastroesophageal reflux disease)     H/O falling     Head injury     Hearing impaired     Hyperlipidemia     Hypertension     Hyponatremia     history of    Limits  Objective   Bed mobility  Bridging: Unable to assess  Rolling to Left: Unable to assess  Rolling to Right: Unable to assess  Supine to Sit: Contact guard assistance  Sit to Supine: Contact guard assistance  Scooting: Contact guard assistance  Transfers  Sit to Stand: Minimal Assistance  Stand to sit: Minimal Assistance  Bed to Chair: Unable to assess  Stand Pivot Transfers: Unable to assess  Squat Pivot Transfers: Unable to assess  Lateral Transfers: Unable to assess  Car Transfer: Unable to assess  Ambulation  Ambulation?: Yes  WB Status: WBAT  More Ambulation?: No  Ambulation 1  Surface: level tile  Device: Rolling Walker  Assistance: Contact guard assistance  Quality of Gait: Pt exhibits slow but steady gait with decreased step length. Distance: 50'x3  Stairs/Curb  Stairs?: No  Neuromuscular Education  NDT Treatment: Gait ;Sitting;Standing  Balance  Posture: Fair  Sitting - Static: Good  Sitting - Dynamic: Good  Standing - Static: Good  Standing - Dynamic: Fair  Exercises  Straight Leg Raise: x10  Quad Sets: x15  Heelslides: x15  Gluteal Sets: x10  Hip Abduction: x15  Knee Long Arc Quad: x15  Ankle Pumps: x20  Comments: hip add x15  Other exercises  Other exercises?: No                        Assessment   Body structures, Functions, Activity limitations: Decreased functional mobility ; Decreased strength;Decreased endurance;Decreased balance  Prognosis: Excellent  REQUIRES PT FOLLOW UP: Yes  Activity Tolerance  Activity Tolerance: Patient Tolerated treatment well;Patient limited by fatigue;Patient limited by endurance       Discharge Recommendations:  Continue to assess pending progress    G-Code     OutComes Score                                                    AM-PAC Score             Goals  Short term goals  Time Frame for Short term goals: 3days  Short term goal 1: Transfers with mod independent   Short term goal 2: Bed mobility with SBA   Short term goal 3: Amb x 50ft with RW and Mod

## 2018-02-21 NOTE — PROGRESS NOTES
Occupational Therapy  Facility/Department: [facility]  Daily Treatment Note  NAME: Terri Rankin  : 7/3/1929  MRN: [id number]    Date of Service: 2018    Patient Diagnosis(es): The encounter diagnosis was Acute on chronic congestive heart failure, unspecified congestive heart failure type ([address] has a past medical history of Anxiety; Atrial fibrillation ([address] Mitchell esophagus; CHF (congestive heart failure) (HCC); CKD (chronic kidney disease); CTS (carpal tunnel syndrome); Depression; GERD (gastroesophageal reflux disease); H/O falling; Head injury; Hearing impaired; Hyperlipidemia; Hypertension; Hyponatremia; Jaw fracture ([address] Osteoarthritis; Peripheral neuropathy ([address] Peripheral vascular disease ([address] Prostate cancer ([address] Sick sinus syndrome ([address] SOB (shortness of breath); Spinal stenosis; Type II or unspecified type diabetes mellitus without mention of complication, not stated as uncontrolled; and Unspecified sleep apnea. has a past surgical history that includes Total hip arthroplasty (Right, ); Total hip arthroplasty (Left, ); Cholecystectomy; Cardiac catheterization; pacemaker placement (); Carpal tunnel release (Left, May 2015); other surgical history; Pilonidal cyst excision; cyst removal; Colonoscopy (Dec. 2010); Upper gastrointestinal endoscopy (Dec. 2010); transesophageal echocardiogram (Aug. 2009); and pacemaker placement. Restrictions  Restrictions/Precautions  Restrictions/Precautions: Fall Risk, General Precautions  Required Braces or Orthoses?: Yes  Implants present? : Pacemaker, Metal implants  Subjective   General  Chart Reviewed: Yes  Patient assessed for rehabilitation services?: Yes  Family / Caregiver Present: No  Referring Practitioner: RANDAL Thakur  Diagnosis: CHF  Subjective  Subjective:  (Pt rec'd in chair and asleep.  Pt required several loud verbals to arouse from sleep. )  General Comment  Comments:  (Pt reports he needs to use the restroom right now. Pt tries to get up as soon as foot rest is put down. Pt is very resistant to safety cues. )  Pain Assessment  Patient Currently in Pain: No (Simultaneous filing. User may not have seen previous data.)  Vital Signs  Patient Currently in Pain: No (Simultaneous filing. User may not have seen previous data.)   Orientation  Orientation  Overall Orientation Status: Within Functional Limits  Objective    ADL  Toileting:  (poor safety awareness)        Standing Balance  Sit to stand: Contact guard assistance (for safety)  Stand to sit: Contact guard assistance (for safety)  Functional Mobility  Functional - Mobility Device: Rolling Walker (Verbal cues for safety)  Toilet Transfers  Equipment Used: Raised toilet seat with rails (Verbal cues to use rails and not pull up on walker. )  Toilet Transfer: Contact guard assistance     Transfers  Sit to stand: Contact guard assistance (for safety)  Stand to sit: Contact guard assistance (for safety)       Assessment      Safety Devices  Type of devices: Chair alarm in place; Left in chair;Nurse notified;Call light within reach       Discharge Recommendations:  24 hour supervision or assist, Home with [address] [place] [address]  Times per week: 1  G-Code     OutComes Score     AM-PAC Score     Goals  Short term goals  Time Frame for Short term goals: 1x visit  Short term goal 1: Patient to complete toilet transfer and toileting tasks with S using a/e as needed and demonstrating good- safety       Therapy Time   Individual Concurrent Group Co-treatment   Time In 1020         Time Out 1044         Minutes 24                 SUE Ponce

## 2018-02-21 NOTE — PROGRESS NOTES
STELLA  GI/Abdomen: Bowel Sounds Present, Soft, Non-tender, without Guarding or Rebound Tenderness and No Masses  : Not examined  EXT/Skin: 2+ pitting edema BLE up to below the knees, No Cyanosis and No Clubbing  Neuro: generalized weakness, ambulates with walker at baseline (unsteady), Dementia at baseline and No Localizing Signs/Symptoms      Assessment:    Active Problems:    CHF (congestive heart failure), NYHA class I, acute on chronic, combined ([address]  Resolved Problems:    * No resolved hospital problems. *          Plan:  1. CHF--IV dobutamine gtt--IV diuresis--fluid restriction--I&Os--daily weights--poor long term prognosis  2. CKD--monitor fluid balance and renal function  3. DONA--encouraged Cpap use at rest/HS--patient refusing  4.  See orders    Electronically signed by Geanie Cogan NP-C, FNP-BC on 2/20/2018 at 9:44 PM    Hospitalist

## 2018-02-22 NOTE — DISCHARGE SUMMARY
herein. HISTORY OF PRESENT ILLNESS AND HOSPITAL COURSE:  The patient is an  80-year-old white male, patient of Tamara Santillan, Internal Medicine, and  Martha Cid, Cardiology. The patient presented with recurrent event  of acute on chronic combined systolic-diastolic congestive heart failure. The patient had been recently hospitalized for the same, was on a  dobutamine drip at that time as well. At this time, the patient was placed  on dobutamine drip together with IV diuretics. The patient's chest x-ray  on the date of discharge is significantly improved. The patient's lower  extremity edema is nearly resolved. The patient was able to be discharged  on 02/21/2018. One of the major issues for the this patient is in addition  to his declining cardiac function, his dementia has been quite significant. From this standpoint, he has been unable to be managed in the home setting. The patient at this time will be going to [facility]. The patient's other medical problems remain stable during the course of the  hospitalization including his diabetes. The patient's Coumadin INR was  2.0. The patient has sleep apnea and is supposed     to use his CPAP for sleep and rest--non-compliant     A corine conversation was held with the patient and his wife, Reema Snyder,  regarding the patient's condition that basically dobutamine is merely a  stopgap measure, but it is not curative, but merely buying him time. We  also discussed the case with the patient's cardiologist who has  recommended hospice care. E-mails are also provided to the patient's son who is a physician in  [place]. Basically, the patient's condition continues to fail despite  getting dobutamine treatments. It goes for a period of time and then  relapses. We also discussed the issue of code status as well.   We would  recommend certainly [facility] if not [facility] given the patient's  likelihood of survival through a major

## 2018-02-26 NOTE — TELEPHONE ENCOUNTER
BODØ from [employer] calling stating that they have a lot of reservations about pt going home, but family is instant on taking pt home. BODØ would like to talk about this with LK or nurse.

## 2018-03-04 PROBLEM — I50.42 CHRONIC COMBINED SYSTOLIC AND DIASTOLIC CHF (CONGESTIVE HEART FAILURE) (HCC): Status: ACTIVE | Noted: 2018-01-01

## 2018-03-05 NOTE — PROGRESS NOTES
 CHF (congestive heart failure) (HCC)     CKD (chronic kidney disease)     CTS (carpal tunnel syndrome)     Depression     GERD (gastroesophageal reflux disease)     H/O falling     Head injury     Hearing impaired     Hyperlipidemia     Hypertension     Hyponatremia     history of    Jaw fracture (HCC)     History of broken jaw in the  from fisticuffs    Osteoarthritis     Peripheral neuropathy ([facility]     Peripheral vascular disease ([facility] [facility]     right internal carotid 40%, left internal carotid 40% stenosis    Prostate cancer ([facility] [address] [facility]     post reactive seed implant    Sick sinus syndrome (HCC)     SOB (shortness of breath)     history of mild SOB - spirometry showing minimal restrictive defect (total lung capactiy 74%) with normal diffusion capacity, May 2013    Spinal stenosis     Type II or unspecified type diabetes mellitus without mention of complication, not stated as uncontrolled     Unspecified sleep apnea        Past Surgical History:   Procedure Laterality Date    CARDIAC CATHETERIZATION      coronary artery cath in 2003 showed normal coronaries    CARPAL TUNNEL RELEASE Left May 2015    CHOLECYSTECTOMY      COLONOSCOPY  Dec. 2010    Dr. Kavon Bailey      epidermal inclusion cyst from scalp    OTHER SURGICAL HISTORY      brachytherapy for postate cancer    PACEMAKER PLACEMENT  2010    PACEMAKER PLACEMENT      may 2016    PILONIDAL CYST EXCISION      TOTAL HIP ARTHROPLASTY Right 2004    TOTAL HIP ARTHROPLASTY Left 1984    TRANSESOPHAGEAL ECHOCARDIOGRAM  Aug. 2009    Dr. Reid Peoples  Dec. 2010    Dr. Jinny Joe, findings of Mitchell's esophagus       Medications as per [facility]     Social History     Social History    Marital status:      Spouse name: N/A    Number of children: N/A    Years of education: N/A     Occupational History    Not on file.      Social History Main Topics    Smoking status: Former Smoker     Packs/day: 1.00     Years: 4.00     Types: Cigarettes     Quit date: 1/1/1955    Smokeless tobacco: Never Used      Comment: former smoker    Alcohol use 0.0 oz/week      Comment: beer occasionally    Drug use: No    Sexual activity: Not on file     Other Topics Concern    Not on file     Social History Narrative    No narrative on file       Review of Systems   Constitutional: Positive for malaise/fatigue. Negative for chills, diaphoresis, fever and weight loss. HENT: Negative for congestion, hearing loss, nosebleeds, sinus pain and sore throat. Eyes: Negative for discharge and redness. Respiratory: Negative for cough, sputum production, shortness of breath and wheezing. Cardiovascular: Negative for chest pain, palpitations, orthopnea and leg swelling. Gastrointestinal: Negative for abdominal pain, blood in stool, constipation, diarrhea, heartburn, nausea and vomiting. Genitourinary: Negative for dysuria and flank pain. Musculoskeletal: Negative for myalgias and neck pain. Skin: Negative for rash. Neurological: Positive for weakness. Negative for dizziness, tremors, sensory change, seizures and headaches. Psychiatric/Behavioral: Negative for depression. The patient is not nervous/anxious and does not have insomnia. Physical Exam   Constitutional: He is oriented to person, place, and time and well-developed, well-nourished, and in no distress. No distress. Eyes: Right eye exhibits no discharge. Left eye exhibits no discharge. Neck: No JVD present. No tracheal deviation present. Cardiovascular: Normal rate and regular rhythm. Exam reveals no gallop and no friction rub. Murmur heard. Pulmonary/Chest: Effort normal and breath sounds normal. No respiratory distress. He has no wheezes. He has no rales. Abdominal: Soft. Bowel sounds are normal. He exhibits no distension. There is no tenderness. There is no rebound.    Musculoskeletal: He exhibits edema (+1 edema to bialt lower extremities, nonpitting ). Neurological: He is alert and oriented to person, place, and time. No cranial nerve deficit. Gait normal. Coordination normal.   Skin: Skin is warm and dry. No rash noted. He is not diaphoretic. Psychiatric: Mood, memory, affect and judgment normal.   Nursing note and vitals reviewed. Vital Signs: Temperature 97.9°F, blood pressure 94/64, pulse 70, respirations 16, SPO2 100% on room air    Assessment:  1. Chronic combined systolic and diastolic CHF (congestive heart failure) ([facility])  Stable- continue to monitor weight, edema, lung sounds continues on Aldactone, Lasix, and Entresto   1500 mL fluid restriction  2. Anxiety  Stable on Zoloft    3. Essential hypertension  Blood pressure mildly low today. They're to continue to monitor this and notify me if this persists. 4. Gastroesophageal reflux disease without esophagitis  Stable off of medications    5. Hyperlipidemia, unspecified hyperlipidemia type  Stable, continues on atorvastatin    6. General weakness  Work with PTand OT with a goal of a short-term stay    7. Type 2 diabetes mellitus without complication, without long-term current use of insulin ([facility] [address]  April on metformin, continue to monitor blood sugars    8. Benign prostatic hyperplasia without lower urinary tract symptoms  Stable on Flomax    9. DONA (obstructive sleep apnea)  Refuses CPAP    10. Silent aspiration, sequela  Speech therapy to eval and treat. Currently on nectar thick liquids    11. PAF (paroxysmal atrial fibrillation) ([facility])  Continues on Coumadin. INR on the 29th.    12. Chronic kidney disease, unspecified CKD stage  Stable, BMP completed on 29th      Plan:  As noted above. Hospital records were reviewed. Follow up for routine visit. Call sooner with concerns prior.     Electronically signed by Valene Kawasaki, CNP on 3/4/2018 at 10:49 PM

## 2018-03-14 PROBLEM — E11.9 DIABETES MELLITUS (HCC): Status: ACTIVE | Noted: 2018-01-01

## 2018-03-14 NOTE — PROGRESS NOTES
Ascorbic Acid (VITAMIN C) 500 MG tablet Take 500 mg by mouth 3 times daily      Cholecalciferol (VITAMIN D3) 1000 UNITS TABS Take 1,000 Units by mouth daily      Multiple Vitamins-Minerals (PRESERVISION AREDS 2) CAPS Take 1 capsule by mouth 2 times daily      Psyllium (METAMUCIL PO) Take by mouth daily as needed       CPAP Machine MISC CPAP Machine. Use nightly 1 each 0    sertraline (ZOLOFT) 100 MG tablet Take 100 mg by mouth daily.  tamsulosin (FLOMAX) 0.4 MG capsule Take 0.4 mg by mouth daily. No current facility-administered medications for this visit. Allergies   Allergen Reactions    Bextra [Valdecoxib] Other (See Comments)     CONFUSION    Biaxin [Clarithromycin]     Vasotec [Enalapril] Other (See Comments)     COUGH       Health Maintenance   Topic Date Due    Shingles Vaccine (1 of 2 - 2 Dose Series) 07/03/1979    DTaP/Tdap/Td vaccine (1 - Tdap) 05/12/2018 (Originally 2/26/2014)    Potassium monitoring  02/21/2019    Creatinine monitoring  02/21/2019    Flu vaccine  Completed    Pneumococcal low/med risk  Completed       Subjective:      Review of Systems   Constitutional: Negative for chills and fever. HENT: Negative for hearing loss. Eyes: Negative for pain and visual disturbance. Respiratory: Negative for cough and shortness of breath. Cardiovascular: Negative for chest pain, palpitations and leg swelling. Gastrointestinal: Negative for abdominal pain, blood in stool, constipation, diarrhea, nausea and vomiting. Endocrine: Negative for cold intolerance, polydipsia and polyuria. Genitourinary: Negative for difficulty urinating, dysuria and hematuria. Musculoskeletal: Negative for arthralgias, back pain, gait problem and neck pain. Skin: Negative for pallor and rash. Neurological: Negative for dizziness, weakness, numbness and headaches. Hematological: Negative for adenopathy. Does not bruise/bleed easily.    Psychiatric/Behavioral: Negative for confusion. The patient is not nervous/anxious. Objective:     Physical Exam   Constitutional: He is oriented to person, place, and time. He appears well-developed and well-nourished. HENT:   Head: Normocephalic and atraumatic. Eyes: EOM are normal. Pupils are equal, round, and reactive to light. Neck: Neck supple. Cardiovascular: Normal rate and regular rhythm. Exam reveals no gallop and no friction rub. No murmur heard. Pulmonary/Chest: Effort normal and breath sounds normal. He has no wheezes. He has no rales. Abdominal: Soft. He exhibits no distension and no mass. There is no tenderness. There is no rebound. Musculoskeletal: Normal range of motion. He exhibits no edema. Lymphadenopathy:     He has no cervical adenopathy. Neurological: He is alert and oriented to person, place, and time. No cranial nerve deficit (grossly). Skin: Skin is warm and dry. No rash noted. Psychiatric: He has a normal mood and affect. Thought content normal.   Vitals reviewed. BP 90/60 (Site: Right Arm, Position: Sitting, Cuff Size: Medium Adult)   Pulse 80   Resp 16   Ht 5' 9.5\" (1.765 m)   Wt 157 lb (71.2 kg)   BMI 22.85 kg/m²     Assessment:      1. Essential hypertension  Hemoglobin    Iron And TIBC    Hemoglobin A1C    Microalbumin, Ur    Basic Metabolic Panel   2. Chronic combined systolic and diastolic CHF (congestive heart failure) (HCC)  Hemoglobin A1C    Microalbumin, Ur    Basic Metabolic Panel   3. Type 2 diabetes mellitus without complication, without long-term current use of insulin ([facility])  Hemoglobin    Iron And TIBC    Hemoglobin A1C    Microalbumin, Ur    Basic Metabolic Panel   4. Anemia, unspecified type  Hemoglobin    Iron And TIBC   5. Chronic kidney disease, unspecified CKD stage     6. Type 2 diabetes mellitus with other diabetic kidney complication, without long-term current use of insulin ([facility] [address]     7.  DONA (obstructive sleep apnea)        Uses his CPAP machine nightly for his obstructive sleep apnea, and he is benefiting from the CPAP machine. Plan:      Return in about 3 months (around 6/14/2018) for Hypertension, CHF, Diabetes. Orders Placed This Encounter   Procedures    Hemoglobin     Standing Status:   Future     Standing Expiration Date:   3/14/2019    Iron And TIBC     Standing Status:   Future     Standing Expiration Date:   3/14/2019     Order Specific Question:   Is Patient Fasting? Answer:   no     Order Specific Question:   No of Hours? Answer:   no    Hemoglobin A1C     Standing Status:   Future     Standing Expiration Date:   3/14/2019    Microalbumin, Ur     Standing Status:   Future     Standing Expiration Date:   3/14/2019    Basic Metabolic Panel     Standing Status:   Future     Standing Expiration Date:   3/14/2019     No orders of the defined types were placed in this encounter. Patient given educational materials - see patient instructions. Discussed use, benefit, and side effects of prescribed medications. All patient questions answered. Pt voiced understanding. Reviewed health maintenance. Instructed to continue current medications, diet and exercise. Patient agreed with treatment plan. Follow up as directed.      Electronically signed by Ray Main MD on 3/22/2018 at 11:27 AM

## 2018-03-14 NOTE — TELEPHONE ENCOUNTER
Wheels broke off his walker and [facility] said they can't put them back on.   Patient will need new RX for wheeled walker faxed to [facility] please

## 2018-03-16 NOTE — TELEPHONE ENCOUNTER
Jad Jim with [employer] is currently at the patient's residence. She states the fall occurred before she arrived. The wife was unsure how long the patient was outside and patient and spouse unsure if he lost consciousness. Jad Jim reports the patient seems slightly more confused during the visit today. She also reports patient's bp has been running low the last few visits, systolic bp under 376. Advised ER eval d/t fall. Jad Jim voiced understanding and will discuss with patient's spouse.

## 2018-03-16 NOTE — ED PROVIDER NOTES
TABLET    Take 1 tablet by mouth daily    TAMSULOSIN (FLOMAX) 0.4 MG CAPSULE    Take 0.4 mg by mouth daily. WARFARIN (COUMADIN) 2 MG TABLET    Take  2mg on Tues/Thur/Sat/Sun    WARFARIN (COUMADIN) 2 MG TABLET    4mg on Mon/Wed/Fri       ALLERGIES     is allergic to bextra [valdecoxib]; biaxin [clarithromycin]; and vasotec [enalapril]. FAMILY HISTORY     indicated that his mother is . He indicated that his father is . He indicated that the status of his sister is unknown.      family history includes Heart Attack in his father; Mult Sclerosis in his sister; Substance Abuse in his father. SOCIAL HISTORY      reports that he quit smoking about 63 years ago. His smoking use included Cigarettes. He has a 4.00 pack-year smoking history. He has never used smokeless tobacco. He reports that he drinks alcohol. He reports that he does not use drugs. PHYSICAL EXAM    (up to 7 for level 4, 8 or more for level 5)   INITIAL VITALS:  height is 5' 9.5\" (1.765 m) and weight is 153 lb (69.4 kg). His temperature is 97 °F (36.1 °C). His blood pressure is 97/62 and his pulse is 71. His respiration is 16 and oxygen saturation is 99%. Physical Exam   Constitutional: He appears well-developed and well-nourished. HENT:   Head: Normocephalic and atraumatic. Right Ear: External ear normal.   Left Ear: External ear normal.   Eyes: Conjunctivae are normal. Pupils are equal, round, and reactive to light. Neck: No JVD present. No tracheal deviation present. No overt posterior neck pain appreciated   Cardiovascular: Normal rate, regular rhythm and normal heart sounds. Pulmonary/Chest: Effort normal and breath sounds normal. No stridor. No respiratory distress. He has no wheezes. He has no rales. Abdominal: Soft. Bowel sounds are normal. He exhibits no distension and no mass. There is no tenderness. There is no rebound and no guarding. Musculoskeletal: Normal range of motion.  He exhibits no (36.1 °C), Pulse: 71, Resp: 16      RE-EVALUATION:  The patient presents after a fall on Coumadin. CT scan showed no acute process the patient is at his baseline status he is neurovascularly intact with this I do feel able to be followed up as an outpatient I'm recommending he return to the ER for any visual hearing changes numbness weakness, vomiting or other concerns otherwise to follow-up with his family doctor within the next few days  At this time the patient is without objective evidence of an acute process requiring hospitalization or inpatient management. They have remained hemodynamically stable throughout their entire ED visit and are stable for discharge with outpatient follow-up. The plan has been discussed in detail and they are aware of the specific conditions for emergent return, as well as the importance of follow-up    I have reviewed the disposition diagnosis with the patient and or their family/guardian. I have answered their questions and given discharge instructions. They voiced understanding of these instructions and did not have any further questions or complaints. PROCEDURES:  None    FINAL IMPRESSION      1. Injury of head, initial encounter          DISPOSITION/PLAN   DISPOSITION        CONDITION ON DISPOSITION:   Stable    PATIENT REFERRED TO:  Akila Cid MD  [facility] [phone]    Call in 3 days        DISCHARGE MEDICATIONS:  New Prescriptions    No medications on file       (Please note that portions of this note were completed with a voice recognition program.  Efforts were made to edit the dictations but occasionally words are mis-transcribed.)    Esquivel MD, F.A.C.E.P.   Attending Emergency Medicine Physician       David Kong MD  03/16/18 [id number]

## 2018-03-16 NOTE — TELEPHONE ENCOUNTER
Andrea Olson from  with [address] calling office to report a fall. Patient fell this morning while going out to get his paper. Did not have his walker with him. Patient did hit his head and has a skin tear on his forehead. No other injury and patient feels ok.

## 2018-03-17 NOTE — PROGRESS NOTES
ANTICOAGULATION SERVICE    Date of Clinic Visit:  3/17/2018    Terri Rankin is a 80 y.o. male who presents to clinic today for anticoagulation monitoring and adjustment. Recent INR Results:  Internal QC passed  Lab Results   Component Value Date    INR 3.4 03/17/2018    INR 3.4 03/17/2018       Current Warfarin Dosage:  Dosing Plan  As of 3/16/2018    TTR:   76.8 % (4.5 y)   Full instructions:   3/17: 1 mg; Otherwise 4 mg on Mon, Wed, Fri; 2 mg all other days               Assessment/Plan:    Modify warfarin dose as noted above: INR high today. I am going to decrease this weeks dose by 5% (1 mg). Recheck one week. Next Clinic Appointment:  Return date  As of 3/16/2018    TTR:   76.8 % (4.5 y)   Next INR check:   3/23/2018             Please call [facility] at [phone] with any questions. Thanks!   Amrita Mccabe [facility]  Anticoagulation Service Pharmacist  3/17/2018 10:51 AM

## 2018-03-19 NOTE — CARE COORDINATION
Ambulatory Care Coordination ED Follow up Call       Reason for ED Visit:  Fall, head injury  Care Management Risk Score: CMRS 11  How are you feeling? :     improved  Patient Reports the following:  Wife reports no associated symptoms, no c/o pain, no changes in vision, no nausea or vomiting, voices no concerns             Contact RNCC regarding any worsening symptoms from above. Did you call your PCP prior to going to the ED? No          Post Discharge Status:  What health concerns since you left the Emergency Room?  na    Do you have wounds that you are caring for at home? No    Do you have a follow up appt scheduled? yes    Review of Instructions:                                 Do you have any questions regarding your discharge instructions?:  No  Medications:    What questions do you have about your medications? No  Are you taking your medications as directed? If not - why? Yes   Can you afford your medications? yes  ADLS:  Do you need assistance of any kind at home? No   What assistance is needed?  na      FU appts/Provider:    Future Appointments  Date Time Provider Waylon Hussein   3/23/2018 4:00 PM [address] [facility] MEDICATION [facility]MT [facility] MED MGMT [place]   3/29/2018 10:00 AM BERRY Layton [address] [facility] SPEECH [place]   3/29/2018 10:00 AM MD FLUORO ROOM MD[facility]Z RAD STV [place]   6/8/2018 9:40 AM SCHEDULE, [address] LAB [address] [facility] LAB [place]   6/15/2018 2:30 PM Peter George MD [address] Maintenance Due   Topic Date Due    Shingles Vaccine (1 of 2 - 2 Dose Series) 07/03/1979     Patient advised to contact PCP office to have HM items/records faxed to PCP Office directly?   N/A

## 2018-03-21 NOTE — TELEPHONE ENCOUNTER
[facility] called to update physician on patients status. Patient had a fall over the weekend with an abrasion to forehead. Patient was seen in ER per spouse. (-) CT scan and urinalysis. Patient has no edema and no increased confusion. Nurse states that he is not complaining of pain. Nurse is not requesting any orders.

## 2018-03-22 NOTE — TELEPHONE ENCOUNTER
Last appt: 3/21/2018  Next appt: 6/15/2018    Coleen Gayathri (patient wife) called in saying that patient is needing a new cpap mask. Patient was in to see [facility] on March 14 for post nursing home. We just need to send new documentation to [facility] with a script for a new cpap mask.

## 2018-05-05 PROBLEM — E86.0 DEHYDRATION: Status: ACTIVE | Noted: 2018-01-01

## 2018-05-09 PROBLEM — I50.23 ACUTE ON CHRONIC SYSTOLIC CHF (CONGESTIVE HEART FAILURE) (HCC): Status: RESOLVED | Noted: 2018-01-01 | Resolved: 2018-01-01

## 2018-05-09 PROBLEM — R07.9 CHEST PAIN: Status: RESOLVED | Noted: 2017-07-20 | Resolved: 2018-01-01

## 2018-05-14 PROBLEM — I50.9 CHF (CONGESTIVE HEART FAILURE) (HCC): Status: ACTIVE | Noted: 2018-01-01

## 2018-05-14 PROBLEM — I50.43 CHF (CONGESTIVE HEART FAILURE), NYHA CLASS I, ACUTE ON CHRONIC, COMBINED (HCC): Status: ACTIVE | Noted: 2018-01-01

## 2018-05-14 PROBLEM — R29.6 FALLS FREQUENTLY: Status: ACTIVE | Noted: 2018-01-01

## 2018-05-26 PROBLEM — I50.43 ACUTE ON CHRONIC COMBINED SYSTOLIC AND DIASTOLIC CHF (CONGESTIVE HEART FAILURE) (HCC): Status: ACTIVE | Noted: 2018-01-01

## 2018-06-04 PROBLEM — E86.0 DEHYDRATION: Status: RESOLVED | Noted: 2018-01-01 | Resolved: 2018-01-01

## 2018-06-06 PROBLEM — I50.20 CHF (CONGESTIVE HEART FAILURE), NYHA CLASS I, UNSPECIFIED FAILURE CHRONICITY, SYSTOLIC (HCC): Status: ACTIVE | Noted: 2018-01-01

## 2018-06-26 PROBLEM — I50.20 CHF (CONGESTIVE HEART FAILURE), NYHA CLASS I, UNSPECIFIED FAILURE CHRONICITY, SYSTOLIC (HCC): Status: RESOLVED | Noted: 2018-01-01 | Resolved: 2018-01-01

## 2018-06-26 PROBLEM — I50.9 CHF (CONGESTIVE HEART FAILURE) (HCC): Status: RESOLVED | Noted: 2018-01-01 | Resolved: 2018-01-01

## 2018-06-26 PROBLEM — I50.43 ACUTE ON CHRONIC COMBINED SYSTOLIC AND DIASTOLIC CHF (CONGESTIVE HEART FAILURE) (HCC): Status: RESOLVED | Noted: 2018-01-01 | Resolved: 2018-01-01

## 2018-08-07 NOTE — TELEPHONE ENCOUNTER
TR fax. Dari is planning to re-admit to our center from hospital.  He will be returning on Hospice care with the company of family's choice (to be decided)   Will you follow this patients care as you did during his previous stay?

## 2023-07-07 NOTE — PROGRESS NOTES
with two or more consistencies, non-functional cough;  OR Unable to achieve a swallow.       SIGNS AND SYMPTOMS OF LARYNGEAL PENETRATION / ASPIRATION:  [] No evidence of laryngeal penetration  [] No evidence of aspiration  [x] Laryngeal penetration evident with: nectar thick and honey thick to the vocal folds without reflexive cough  [] Audible aspiration evident with:  [x] Silent aspiration evident with: thin liquids    PENETRATION-ASPIRATION SCALE (PAS):  [] 1 = Material does not enter the airway  [] 2 = Material enters the airway, remains above the vocal folds, and is ejected from the airway  [] 3 = Material enters the airway, remains above the vocal folds, and is not ejected from airway  [] 4 = Material enters the airway, contacts the vocal folds, and is ejected from the airway  [] 5 = Material enters the airway, contacts the vocal folds, and is not ejected from the airway  [] 6 = Material enters the airway, passes below the vocal folds, and is ejected into the larynx or out of the airway  [] 7 = Material enters the airway, passes below the vocal folds, and is not ejected from the trachea despite effort  [x] 8 = Material enters the airway, passes below the vocal folds, and no effort is made to eject    ESOPHAGEAL PHASE:    [x] No significant findings  [] See Radiology Report for details  [] Recommend further esophageal testing    ATTEMPTED TECHNIQUES:                                 Comments:  []Small bolus size [] Effective []Not Effective    []Straw [] Effective []Not Effective    []Cup [] Effective []Not Effective    [x]Chin tuck [] Effective [x]Not Effective    []Head Turn [] Effective []Not Effective    []Spoon presentations [] Effective []Not Effective    [x] Volitional cough [] Effective [x]Not Effective weak   []Spontaneous cough [] Effective []Not Effective    [x]OTHER:  Head tilt,  Double swallow [] Effective [x]Not Effective Stasis clears minimally with double swallow     DIAGNOSTIC IMPRESSIONS:
Patient unable to complete

## 2024-12-18 NOTE — TELEPHONE ENCOUNTER
Hi Ms Farhana,    Your blood work shows mild amount of fat in your liver. Continue to work on healthy lifestyle. See you at next visit in April.    Robert,  Dr Wright Yi Hews forward to Dr. Von Mayfield?